# Patient Record
Sex: FEMALE | Race: BLACK OR AFRICAN AMERICAN | NOT HISPANIC OR LATINO | Employment: FULL TIME | URBAN - METROPOLITAN AREA
[De-identification: names, ages, dates, MRNs, and addresses within clinical notes are randomized per-mention and may not be internally consistent; named-entity substitution may affect disease eponyms.]

---

## 2017-01-06 ENCOUNTER — OFFICE VISIT (OUTPATIENT)
Dept: FAMILY MEDICINE | Facility: CLINIC | Age: 42
End: 2017-01-06
Payer: COMMERCIAL

## 2017-01-06 VITALS
BODY MASS INDEX: 29.72 KG/M2 | SYSTOLIC BLOOD PRESSURE: 120 MMHG | WEIGHT: 178.38 LBS | OXYGEN SATURATION: 98 % | HEIGHT: 65 IN | TEMPERATURE: 98 F | DIASTOLIC BLOOD PRESSURE: 80 MMHG | HEART RATE: 98 BPM

## 2017-01-06 DIAGNOSIS — K21.9 GASTROESOPHAGEAL REFLUX DISEASE WITHOUT ESOPHAGITIS: ICD-10-CM

## 2017-01-06 DIAGNOSIS — F40.248 FEAR OF PUBLIC SPEAKING: Primary | ICD-10-CM

## 2017-01-06 DIAGNOSIS — F90.9 ATTENTION DEFICIT HYPERACTIVITY DISORDER (ADHD), UNSPECIFIED ADHD TYPE: ICD-10-CM

## 2017-01-06 PROCEDURE — 1159F MED LIST DOCD IN RCRD: CPT | Mod: S$GLB,,, | Performed by: FAMILY MEDICINE

## 2017-01-06 PROCEDURE — 99999 PR PBB SHADOW E&M-EST. PATIENT-LVL III: CPT | Mod: PBBFAC,,, | Performed by: FAMILY MEDICINE

## 2017-01-06 PROCEDURE — 99214 OFFICE O/P EST MOD 30 MIN: CPT | Mod: S$GLB,,, | Performed by: FAMILY MEDICINE

## 2017-01-06 RX ORDER — PROPRANOLOL HYDROCHLORIDE 40 MG/1
40 TABLET ORAL DAILY PRN
Qty: 10 TABLET | Refills: 0 | Status: SHIPPED | OUTPATIENT
Start: 2017-01-06 | End: 2017-10-24

## 2017-01-06 RX ORDER — LORAZEPAM 0.5 MG/1
0.5 TABLET ORAL DAILY PRN
Qty: 10 TABLET | Refills: 0 | Status: SHIPPED | OUTPATIENT
Start: 2017-01-06 | End: 2018-08-16 | Stop reason: SDUPTHER

## 2017-01-06 NOTE — MR AVS SNAPSHOT
[facility]  [address]  [place] [address]  Phone: [phone]  Fax: [phone]                  Fadumo Barnhart   2017 7:30 AM   Office Visit    Description:  Female : 1975   Provider:  Suze Chi MD   Department:  [facility]           Reason for Visit     Anxiety                To Do List           Future Appointments        Provider Department Dept Phone    2017 2:30 PM PRE-ADMIT 1, [facility] [phone]      Your Future Surgeries/Procedures     Mar 02, 2017   Surgery with Kyel Merida MD   [facility] ([facility])    [address] [place] [address] [phone]              Goals (5 Years of Data)     None       These Medications        Disp Refills Start End    propranolol (INDERAL) 40 MG tablet 10 tablet 0 2017    Take 1 tablet (40 mg total) by mouth daily as needed. 60 minutes prior to anxiety-provoking event. - Oral    [facility]: [facility] [address] [place] [address] [address] [facility] #: [phone]       lorazepam (ATIVAN) 0.5 MG tablet 10 tablet 0 2017    Take 1 tablet (0.5 mg total) by mouth daily as needed for Anxiety. - Oral    [facility] [address] [place] [address] [address] AT [facility] Ph #: [phone]         [facility]     [facility] -  Assistance  Registered nurses in the [facility] provide clinical advisement, health education, appointment booking, and other advisory services.  Call for this free service at [phone].             Medications           Message regarding Medications     Verify the changes and/or additions to your medication regime listed below are the same as discussed with your clinician today.  If any of these changes or additions are incorrect, please notify your healthcare provider.       "  START taking these NEW medications        Refills    propranolol (INDERAL) 40 MG tablet 0    Sig: Take 1 tablet (40 mg total) by mouth daily as needed. 60 minutes prior to anxiety-provoking event.    Class: Normal    Route: Oral    lorazepam (ATIVAN) 0.5 MG tablet 0    Sig: Take 1 tablet (0.5 mg total) by mouth daily as needed for Anxiety.    Class: Print    Route: Oral           Verify that the below list of medications is an accurate representation of the medications you are currently taking.  If none reported, the list may be blank. If incorrect, please contact your healthcare provider. Carry this list with you in case of emergency.           Current Medications     dextroamphetamine-amphetamine (ADDERALL XR) 20 MG 24 hr capsule Take 1 capsule (20 mg total) by mouth every morning.    esomeprazole (NEXIUM) 20 MG capsule Take 1 capsule (20 mg total) by mouth before breakfast.    lorazepam (ATIVAN) 0.5 MG tablet Take 1 tablet (0.5 mg total) by mouth daily as needed for Anxiety.    propranolol (INDERAL) 40 MG tablet Take 1 tablet (40 mg total) by mouth daily as needed. 60 minutes prior to anxiety-provoking event.           Clinical Reference Information           Vital Signs - Last Recorded  Most recent update: 1/6/2017  7:48 AM by Lindsey Nicholas MA    BP Pulse Temp Ht    120/80 (BP Location: Left arm, Patient Position: Sitting, BP Method: Manual) 98 98.2 °F (36.8 °C) (Oral) 5' 4.75" (1.645 m)    Wt LMP SpO2 BMI    80.9 kg (178 lb 5.6 oz) 12/12/2016 98% 29.91 kg/m2      Blood Pressure          Most Recent Value    BP  120/80      Allergies as of 1/6/2017     Hydrocodone-acetaminophen    Aspirin      Immunizations Administered on Date of Encounter - 1/6/2017     None      "

## 2017-01-06 NOTE — PROGRESS NOTES
Routine Office Visit    Patient Name: Fadumo Barnhart    : 1975  MRN: [id number]    Subjective:  Fadumo is a 41 y.o. female who presents today for     1. Anxiety - pt states she had a recent family death and the wake and  is tomorrow. She is to speak at the [employer] tomorrow and states she has severe anxiety with public speaking. She states when she has to speak in front of a large group, she starts feeling angst, has increase heart rate and does not want to fall apart in front of the crowd. She has been having crying spells thinking about the speech. She is otherwise feeling okay. She is still working out regularly. 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     Review of Systems   Constitutional: Negative for chills and fever.   HENT: Negative for congestion.    Eyes: Negative for blurred vision.   Respiratory: Negative for cough.    Cardiovascular: Negative for chest pain.   Gastrointestinal: Negative for abdominal pain, constipation, diarrhea, heartburn, nausea and vomiting.   Genitourinary: Negative for dysuria.   Musculoskeletal: Negative for myalgias.   Skin: Negative for itching and rash.   Neurological: Negative for dizziness and headaches.   Psychiatric/Behavioral: Negative for depression.       Active Problem List  Patient Active Problem List   Diagnosis    ADHD (attention deficit hyperactivity disorder)    GERD (gastroesophageal reflux disease)       Past Surgical History  Past Surgical History   Procedure Laterality Date    Tonsillectomy      Leep      Gastric bypass      Small intestine surgery       intestine were twisted       Family History  Family History   Problem Relation Age of Onset    Diabetes Mother     Heart disease Mother     Hypertension Mother     Kidney disease Mother     Miscarriages / Stillbirths Mother     Vision loss Mother     Hypertension Father     Alcohol abuse Father      prior, not current    Alcohol abuse Brother     Drug abuse Brother   "   Learning disabilities Brother      ADD/ADHD, dyslexia    Alcohol abuse Paternal Uncle     Cancer Paternal Uncle     Depression Paternal Uncle     Early death Paternal Uncle     Diabetes Maternal Aunt     Miscarriages / Stillbirths Maternal Aunt     Diabetes Maternal Uncle     Kidney disease Maternal Uncle        Social History  Social History     Social History    Marital status:      Spouse name: N/A    Number of children: N/A    Years of education: N/A     Occupational History     [employer]     Social History Main Topics    Smoking status: Never Smoker    Smokeless tobacco: Never Used    Alcohol use Yes    Drug use: No    Sexual activity: Yes     Partners: Male     Other Topics Concern    Not on file     Social History Narrative       Medications and Allergies  Reviewed and updated.       Physical Exam  Visit Vitals    /80 (BP Location: Left arm, Patient Position: Sitting, BP Method: Manual)    Pulse 98    Temp 98.2 °F (36.8 °C) (Oral)    Ht 5' 4.75" (1.645 m)    Wt 80.9 kg (178 lb 5.6 oz)    LMP 12/12/2016    SpO2 98%    BMI 29.91 kg/m2     Physical Exam   Constitutional: She is oriented to person, place, and time. She appears well-developed and well-nourished.   HENT:   Head: Normocephalic and atraumatic.   Eyes: Conjunctivae and EOM are normal. Pupils are equal, round, and reactive to light.   Neck: Normal range of motion. Neck supple.   Cardiovascular: Normal rate, regular rhythm and normal heart sounds.  Exam reveals no gallop and no friction rub.    No murmur heard.  Pulmonary/Chest: Breath sounds normal. No respiratory distress.   Abdominal: Soft. Bowel sounds are normal. She exhibits no distension. There is no tenderness.   Musculoskeletal: Normal range of motion.   Lymphadenopathy:     She has no cervical adenopathy.   Neurological: She is alert and oriented to person, place, and time.   Skin: Skin is warm.   Psychiatric: She has a normal mood and " affect.         Assessment/Plan:  Fadumo Barnhart is a 41 y.o. female who presents today for :    Fear of public speaking  -     propranolol (INDERAL) 40 MG tablet; Take 1 tablet (40 mg total) by mouth daily as needed. 60 minutes prior to anxiety-provoking event.  Dispense: 10 tablet; Refill: 0  -     lorazepam (ATIVAN) 0.5 MG tablet; Take 1 tablet (0.5 mg total) by mouth daily as needed for Anxiety.  Dispense: 10 tablet; Refill: 0  Common side effects of this medication were discussed with the patient. Questions regarding medications were discussed during this visit.   Advise not to take ativan with adderall  Recommend to take propranolol first; only ativan if needed for severe anxiety     Attention deficit hyperactivity disorder (ADHD), unspecified ADHD type  The current medical regimen is effective;  continue present plan and medications.    Gastroesophageal reflux disease without esophagitis  The current medical regimen is effective;  continue present plan and medications.      Return in about 3 months (around 4/6/2017), or if symptoms worsen or fail to improve.

## 2017-02-24 DIAGNOSIS — F90.9 ATTENTION DEFICIT HYPERACTIVITY DISORDER (ADHD), UNSPECIFIED ADHD TYPE: ICD-10-CM

## 2017-02-24 NOTE — TELEPHONE ENCOUNTER
----- Message from Cecil Cleveland sent at 2/24/2017 12:49 PM CST -----  Contact: Self  REFILL: dextroamphetamine-amphetamine (ADDERALL XR) 20 MG 24 hr capsule

## 2017-03-01 ENCOUNTER — TELEPHONE (OUTPATIENT)
Dept: FAMILY MEDICINE | Facility: CLINIC | Age: 42
End: 2017-03-01

## 2017-03-01 RX ORDER — DEXTROAMPHETAMINE SACCHARATE, AMPHETAMINE ASPARTATE MONOHYDRATE, DEXTROAMPHETAMINE SULFATE AND AMPHETAMINE SULFATE 5; 5; 5; 5 MG/1; MG/1; MG/1; MG/1
20 CAPSULE, EXTENDED RELEASE ORAL EVERY MORNING
Qty: 30 CAPSULE | Refills: 0 | Status: SHIPPED | OUTPATIENT
Start: 2017-03-01 | End: 2017-04-18 | Stop reason: SDUPTHER

## 2017-03-01 NOTE — TELEPHONE ENCOUNTER
----- Message from Vera Vail sent at 3/1/2017  8:58 AM CST -----  Patient called to follow up on refill request dextroamphetamine-amphetamine (ADDERALL XR) 20 MG 24 hr capsule. Please call at [phone]. Thank you!

## 2017-04-18 DIAGNOSIS — F90.9 ATTENTION DEFICIT HYPERACTIVITY DISORDER (ADHD), UNSPECIFIED ADHD TYPE: ICD-10-CM

## 2017-04-18 NOTE — TELEPHONE ENCOUNTER
----- Message from Zoe Garcia sent at 4/18/2017  1:04 PM CDT -----  Contact: SELF  REFILL: dextroamphetamine-amphetamine (ADDERALL XR) 20 MG 24 hr capsule    PLEASE CONTACT PATIENT -[phone] WHEN READY.

## 2017-04-19 RX ORDER — DEXTROAMPHETAMINE SACCHARATE, AMPHETAMINE ASPARTATE MONOHYDRATE, DEXTROAMPHETAMINE SULFATE AND AMPHETAMINE SULFATE 5; 5; 5; 5 MG/1; MG/1; MG/1; MG/1
20 CAPSULE, EXTENDED RELEASE ORAL EVERY MORNING
Qty: 30 CAPSULE | Refills: 0 | Status: SHIPPED | OUTPATIENT
Start: 2017-04-19 | End: 2017-05-25 | Stop reason: SDUPTHER

## 2017-05-25 DIAGNOSIS — F90.9 ATTENTION DEFICIT HYPERACTIVITY DISORDER (ADHD), UNSPECIFIED ADHD TYPE: ICD-10-CM

## 2017-05-25 RX ORDER — DEXTROAMPHETAMINE SACCHARATE, AMPHETAMINE ASPARTATE MONOHYDRATE, DEXTROAMPHETAMINE SULFATE AND AMPHETAMINE SULFATE 5; 5; 5; 5 MG/1; MG/1; MG/1; MG/1
20 CAPSULE, EXTENDED RELEASE ORAL EVERY MORNING
Qty: 30 CAPSULE | Refills: 0 | Status: SHIPPED | OUTPATIENT
Start: 2017-05-25 | End: 2017-07-13 | Stop reason: SDUPTHER

## 2017-05-25 NOTE — TELEPHONE ENCOUNTER
----- Message from Alberta William sent at 5/25/2017 11:18 AM CDT -----  Contact: self  Refill request for Maurizio .   [phone]    LL

## 2017-07-07 ENCOUNTER — TELEPHONE (OUTPATIENT)
Dept: FAMILY MEDICINE | Facility: CLINIC | Age: 42
End: 2017-07-07

## 2017-07-07 DIAGNOSIS — F90.9 ATTENTION DEFICIT HYPERACTIVITY DISORDER (ADHD), UNSPECIFIED ADHD TYPE: ICD-10-CM

## 2017-07-07 RX ORDER — DEXTROAMPHETAMINE SACCHARATE, AMPHETAMINE ASPARTATE MONOHYDRATE, DEXTROAMPHETAMINE SULFATE AND AMPHETAMINE SULFATE 5; 5; 5; 5 MG/1; MG/1; MG/1; MG/1
20 CAPSULE, EXTENDED RELEASE ORAL EVERY MORNING
Qty: 30 CAPSULE | Refills: 0 | OUTPATIENT
Start: 2017-07-07

## 2017-07-07 NOTE — TELEPHONE ENCOUNTER
----- Message from Skyla Anegl sent at 7/7/2017  4:00 PM CDT -----  Contact: self  Pt called concerning her script for dextroamphetamine-amphetamine (ADDERALL XR) 20 MG 24 hr capsule. [phone]

## 2017-07-13 ENCOUNTER — OFFICE VISIT (OUTPATIENT)
Dept: FAMILY MEDICINE | Facility: CLINIC | Age: 42
End: 2017-07-13
Payer: COMMERCIAL

## 2017-07-13 VITALS
HEART RATE: 77 BPM | TEMPERATURE: 99 F | OXYGEN SATURATION: 96 % | WEIGHT: 179.44 LBS | SYSTOLIC BLOOD PRESSURE: 100 MMHG | HEIGHT: 65 IN | DIASTOLIC BLOOD PRESSURE: 70 MMHG | BODY MASS INDEX: 29.9 KG/M2

## 2017-07-13 DIAGNOSIS — K21.9 GASTROESOPHAGEAL REFLUX DISEASE WITHOUT ESOPHAGITIS: ICD-10-CM

## 2017-07-13 DIAGNOSIS — F90.9 ATTENTION DEFICIT HYPERACTIVITY DISORDER (ADHD), UNSPECIFIED ADHD TYPE: ICD-10-CM

## 2017-07-13 PROCEDURE — 99214 OFFICE O/P EST MOD 30 MIN: CPT | Mod: S$GLB,,, | Performed by: FAMILY MEDICINE

## 2017-07-13 PROCEDURE — 99999 PR PBB SHADOW E&M-EST. PATIENT-LVL III: CPT | Mod: PBBFAC,,, | Performed by: FAMILY MEDICINE

## 2017-07-13 RX ORDER — DEXTROAMPHETAMINE SACCHARATE, AMPHETAMINE ASPARTATE MONOHYDRATE, DEXTROAMPHETAMINE SULFATE AND AMPHETAMINE SULFATE 5; 5; 5; 5 MG/1; MG/1; MG/1; MG/1
20 CAPSULE, EXTENDED RELEASE ORAL EVERY MORNING
Qty: 30 CAPSULE | Refills: 0 | Status: SHIPPED | OUTPATIENT
Start: 2017-07-13 | End: 2017-10-24 | Stop reason: SDUPTHER

## 2017-07-13 RX ORDER — DEXTROAMPHETAMINE SACCHARATE, AMPHETAMINE ASPARTATE MONOHYDRATE, DEXTROAMPHETAMINE SULFATE AND AMPHETAMINE SULFATE 5; 5; 5; 5 MG/1; MG/1; MG/1; MG/1
20 CAPSULE, EXTENDED RELEASE ORAL EVERY MORNING
Qty: 30 CAPSULE | Refills: 0 | Status: SHIPPED | OUTPATIENT
Start: 2017-09-13 | End: 2017-10-24

## 2017-07-13 RX ORDER — DEXTROAMPHETAMINE SACCHARATE, AMPHETAMINE ASPARTATE MONOHYDRATE, DEXTROAMPHETAMINE SULFATE AND AMPHETAMINE SULFATE 5; 5; 5; 5 MG/1; MG/1; MG/1; MG/1
20 CAPSULE, EXTENDED RELEASE ORAL EVERY MORNING
Qty: 30 CAPSULE | Refills: 0 | Status: SHIPPED | OUTPATIENT
Start: 2017-08-13 | End: 2017-10-24

## 2017-07-13 RX ORDER — HYDROGEN PEROXIDE 3 %
20 SOLUTION, NON-ORAL MISCELLANEOUS
Qty: 90 CAPSULE | Refills: 1 | Status: SHIPPED | OUTPATIENT
Start: 2017-07-13 | End: 2018-11-29 | Stop reason: SDUPTHER

## 2017-07-13 NOTE — PROGRESS NOTES
Routine Office Visit    Patient Name: Fadumo Barnhart    : 1975  MRN: [id number]    Subjective:  Fadumo is a 42 y.o. female who presents today for     1. 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 She does not abuse medication. She does not take more than prescribed. She needs medication to help her focus.   2. GERD - needs refill on nexium    Review of Systems   Constitutional: Negative for chills and fever.   HENT: Negative for congestion.    Eyes: Negative for blurred vision.   Respiratory: Negative for cough.    Cardiovascular: Negative for chest pain.   Gastrointestinal: Positive for heartburn. Negative for abdominal pain, constipation, diarrhea, nausea and vomiting.   Genitourinary: Negative for dysuria.   Musculoskeletal: Negative for myalgias.   Skin: Negative for itching and rash.   Neurological: Negative for dizziness and headaches.   Psychiatric/Behavioral: Negative for depression.       Active Problem List  Patient Active Problem List   Diagnosis    ADHD (attention deficit hyperactivity disorder)    GERD (gastroesophageal reflux disease)       Past Surgical History  Past Surgical History:   Procedure Laterality Date    GASTRIC BYPASS      LEEP      SMALL INTESTINE SURGERY      intestine were twisted    TONSILLECTOMY         Family History  Family History   Problem Relation Age of Onset    Diabetes Mother     Heart disease Mother     Hypertension Mother     Kidney disease Mother     Miscarriages / Stillbirths Mother     Vision loss Mother     Hypertension Father     Alcohol abuse Father      prior, not current    Alcohol abuse Brother     Drug abuse Brother     Learning disabilities Brother      ADD/ADHD, dyslexia    Alcohol abuse Paternal Uncle     Cancer Paternal Uncle     Depression Paternal Uncle     Early death Paternal Uncle     Diabetes Maternal Aunt     Miscarriages /  "Stillbirths Maternal Aunt     Diabetes Maternal Uncle     Kidney disease Maternal Uncle        Social History  Social History     Social History    Marital status:      Spouse name: N/A    Number of children: N/A    Years of education: N/A     Occupational History     [employer]     Social History Main Topics    Smoking status: Never Smoker    Smokeless tobacco: Never Used    Alcohol use Yes    Drug use: No    Sexual activity: Yes     Partners: Male     Other Topics Concern    Not on file     Social History Narrative    No narrative on file       Medications and Allergies  Reviewed and updated.   Current Outpatient Prescriptions   Medication Sig    dextroamphetamine-amphetamine (ADDERALL XR) 20 MG 24 hr capsule Take 1 capsule (20 mg total) by mouth every morning.    [START ON 8/13/2017] dextroamphetamine-amphetamine (ADDERALL XR) 20 MG 24 hr capsule Take 1 capsule (20 mg total) by mouth every morning.    [START ON 9/13/2017] dextroamphetamine-amphetamine (ADDERALL XR) 20 MG 24 hr capsule Take 1 capsule (20 mg total) by mouth every morning.    esomeprazole (NEXIUM) 20 MG capsule Take 1 capsule (20 mg total) by mouth before breakfast.    propranolol (INDERAL) 40 MG tablet Take 1 tablet (40 mg total) by mouth daily as needed. 60 minutes prior to anxiety-provoking event.    lorazepam (ATIVAN) 0.5 MG tablet Take 1 tablet (0.5 mg total) by mouth daily as needed for Anxiety.     No current facility-administered medications for this visit.        Physical Exam  /70 (BP Location: Right arm, Patient Position: Sitting, BP Method: Manual)   Pulse 77   Temp 98.5 °F (36.9 °C) (Oral)   Ht 5' 4.75" (1.645 m)   Wt 81.4 kg (179 lb 7.3 oz)   SpO2 96%   BMI 30.09 kg/m²   Physical Exam   Constitutional: She is oriented to person, place, and time. She appears well-developed and well-nourished.   HENT:   Head: Normocephalic and atraumatic.   Eyes: Conjunctivae and EOM are normal. Pupils " are equal, round, and reactive to light.   Neck: Normal range of motion. Neck supple.   Cardiovascular: Normal rate, regular rhythm and normal heart sounds.  Exam reveals no gallop and no friction rub.    No murmur heard.  Pulmonary/Chest: Breath sounds normal. No respiratory distress.   Abdominal: Soft. Bowel sounds are normal. She exhibits no distension. There is no tenderness.   Musculoskeletal: Normal range of motion.   Lymphadenopathy:     She has no cervical adenopathy.   Neurological: She is alert and oriented to person, place, and time.   Skin: Skin is warm.   Psychiatric: She has a normal mood and affect.         Assessment/Plan:  Fadumo Barnhart is a 42 y.o. female who presents today for :    Attention deficit hyperactivity disorder (ADHD), unspecified ADHD type  -     dextroamphetamine-amphetamine (ADDERALL XR) 20 MG 24 hr capsule; Take 1 capsule (20 mg total) by mouth every morning.  Dispense: 30 capsule; Refill: 0  -     dextroamphetamine-amphetamine (ADDERALL XR) 20 MG 24 hr capsule; Take 1 capsule (20 mg total) by mouth every morning.  Dispense: 30 capsule; Refill: 0  -     dextroamphetamine-amphetamine (ADDERALL XR) 20 MG 24 hr capsule; Take 1 capsule (20 mg total) by mouth every morning.  Dispense: 30 capsule; Refill: 0  Reviewed [place]   The current medical regimen is effective;  continue present plan and medications.  rtc 3 months for refill     Gastroesophageal reflux disease without esophagitis  -     esomeprazole (NEXIUM) 20 MG capsule; Take 1 capsule (20 mg total) by mouth before breakfast.  Dispense: 90 capsule; Refill: 1        Return in about 3 months (around 10/13/2017).

## 2017-10-24 ENCOUNTER — OFFICE VISIT (OUTPATIENT)
Dept: FAMILY MEDICINE | Facility: CLINIC | Age: 42
End: 2017-10-24
Payer: COMMERCIAL

## 2017-10-24 VITALS
WEIGHT: 175.06 LBS | BODY MASS INDEX: 29.17 KG/M2 | DIASTOLIC BLOOD PRESSURE: 80 MMHG | OXYGEN SATURATION: 99 % | HEART RATE: 58 BPM | HEIGHT: 65 IN | SYSTOLIC BLOOD PRESSURE: 110 MMHG | TEMPERATURE: 98 F

## 2017-10-24 DIAGNOSIS — K21.9 GASTROESOPHAGEAL REFLUX DISEASE WITHOUT ESOPHAGITIS: Primary | ICD-10-CM

## 2017-10-24 DIAGNOSIS — F90.9 ATTENTION DEFICIT HYPERACTIVITY DISORDER (ADHD), UNSPECIFIED ADHD TYPE: ICD-10-CM

## 2017-10-24 DIAGNOSIS — Z23 NEED FOR PROPHYLACTIC VACCINATION AND INOCULATION AGAINST INFLUENZA: ICD-10-CM

## 2017-10-24 PROCEDURE — 99214 OFFICE O/P EST MOD 30 MIN: CPT | Mod: 25,S$GLB,, | Performed by: FAMILY MEDICINE

## 2017-10-24 PROCEDURE — 90471 IMMUNIZATION ADMIN: CPT | Mod: S$GLB,,, | Performed by: FAMILY MEDICINE

## 2017-10-24 PROCEDURE — 90686 IIV4 VACC NO PRSV 0.5 ML IM: CPT | Mod: S$GLB,,, | Performed by: FAMILY MEDICINE

## 2017-10-24 PROCEDURE — 99999 PR PBB SHADOW E&M-EST. PATIENT-LVL IV: CPT | Mod: PBBFAC,,, | Performed by: FAMILY MEDICINE

## 2017-10-24 RX ORDER — DEXTROAMPHETAMINE SACCHARATE, AMPHETAMINE ASPARTATE MONOHYDRATE, DEXTROAMPHETAMINE SULFATE AND AMPHETAMINE SULFATE 6.25; 6.25; 6.25; 6.25 MG/1; MG/1; MG/1; MG/1
25 CAPSULE, EXTENDED RELEASE ORAL EVERY MORNING
Qty: 30 CAPSULE | Refills: 0 | Status: SHIPPED | OUTPATIENT
Start: 2017-10-24 | End: 2018-04-07 | Stop reason: SDUPTHER

## 2017-10-24 RX ORDER — DEXTROAMPHETAMINE SACCHARATE, AMPHETAMINE ASPARTATE MONOHYDRATE, DEXTROAMPHETAMINE SULFATE AND AMPHETAMINE SULFATE 5; 5; 5; 5 MG/1; MG/1; MG/1; MG/1
20 CAPSULE, EXTENDED RELEASE ORAL EVERY MORNING
Qty: 30 CAPSULE | Refills: 0 | Status: SHIPPED | OUTPATIENT
Start: 2017-11-22 | End: 2017-12-22

## 2017-10-24 RX ORDER — DEXTROAMPHETAMINE SACCHARATE, AMPHETAMINE ASPARTATE MONOHYDRATE, DEXTROAMPHETAMINE SULFATE AND AMPHETAMINE SULFATE 5; 5; 5; 5 MG/1; MG/1; MG/1; MG/1
20 CAPSULE, EXTENDED RELEASE ORAL EVERY MORNING
Qty: 30 CAPSULE | Refills: 0 | Status: SHIPPED | OUTPATIENT
Start: 2017-12-22 | End: 2018-01-21

## 2017-10-24 NOTE — PROGRESS NOTES
Routine Office Visit    Patient Name: Fadumo Barnhart    : 1975  MRN: [id number]    Subjective:  Fadumo is a 42 y.o. female who presents today for     1.  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 She does not abuse medication. She does not take more than prescribed. She denies any side effect from medications such as weight loss, hypertension, chest pain, heart palpitations or anxiety. She feels current dose is a little low, but it does help her focus. She is getting tired of her job and would prefer to  but needs the benefits. 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     Review of Systems   Constitutional: Negative for chills and fever.   HENT: Negative for congestion.    Eyes: Negative for blurred vision.   Respiratory: Negative for cough.    Cardiovascular: Negative for chest pain.   Gastrointestinal: Negative for abdominal pain, constipation, diarrhea, heartburn, nausea and vomiting.   Genitourinary: Negative for dysuria.   Musculoskeletal: Negative for myalgias.   Skin: Negative for itching and rash.   Neurological: Negative for dizziness and headaches.   Psychiatric/Behavioral: Negative for depression.       Active Problem List  Patient Active Problem List   Diagnosis    ADHD (attention deficit hyperactivity disorder)    GERD (gastroesophageal reflux disease)       Past Surgical History  Past Surgical History:   Procedure Laterality Date    GASTRIC BYPASS      LEEP      SMALL INTESTINE SURGERY      intestine were twisted    TONSILLECTOMY         Family History  Family History   Problem Relation Age of Onset    Diabetes Mother     Heart disease Mother     Hypertension Mother     Kidney disease Mother     Miscarriages / Stillbirths Mother      "Vision loss Mother     Hypertension Father     Alcohol abuse Father      prior, not current    Alcohol abuse Brother     Drug abuse Brother     Learning disabilities Brother      ADD/ADHD, dyslexia    Alcohol abuse Paternal Uncle     Cancer Paternal Uncle     Depression Paternal Uncle     Early death Paternal Uncle     Diabetes Maternal Aunt     Miscarriages / Stillbirths Maternal Aunt     Diabetes Maternal Uncle     Kidney disease Maternal Uncle        Social History  Social History     Social History    Marital status:      Spouse name: N/A    Number of children: N/A    Years of education: N/A     Occupational History     [employer]     Social History Main Topics    Smoking status: Never Smoker    Smokeless tobacco: Never Used    Alcohol use Yes    Drug use: No    Sexual activity: Yes     Partners: Male     Other Topics Concern    Not on file     Social History Narrative    No narrative on file       Medications and Allergies  Reviewed and updated.   Current Outpatient Prescriptions   Medication Sig    [START ON 11/22/2017] dextroamphetamine-amphetamine (ADDERALL XR) 20 MG 24 hr capsule Take 1 capsule (20 mg total) by mouth every morning.    [START ON 12/22/2017] dextroamphetamine-amphetamine (ADDERALL XR) 20 MG 24 hr capsule Take 1 capsule (20 mg total) by mouth every morning.    dextroamphetamine-amphetamine (ADDERALL XR) 25 MG 24 hr capsule Take 1 capsule (25 mg total) by mouth every morning.    esomeprazole (NEXIUM) 20 MG capsule Take 1 capsule (20 mg total) by mouth before breakfast.    lorazepam (ATIVAN) 0.5 MG tablet Take 1 tablet (0.5 mg total) by mouth daily as needed for Anxiety.     No current facility-administered medications for this visit.        Physical Exam  /80 (BP Location: Right arm, Patient Position: Sitting, BP Method: Large (Manual))   Pulse (!) 58   Temp 97.7 °F (36.5 °C) (Oral)   Ht 5' 4.75" (1.645 m)   Wt 79.4 kg (175 lb 0.7 oz) "   LMP 10/03/2017   SpO2 99%   BMI 29.35 kg/m²   Physical Exam   Constitutional: She is oriented to person, place, and time. She appears well-developed and well-nourished.   HENT:   Head: Normocephalic and atraumatic.   Eyes: Conjunctivae and EOM are normal. Pupils are equal, round, and reactive to light.   Neck: Normal range of motion. Neck supple.   Cardiovascular: Normal rate, regular rhythm and normal heart sounds.  Exam reveals no gallop and no friction rub.    No murmur heard.  Pulmonary/Chest: Breath sounds normal. No respiratory distress.   Abdominal: Soft. Bowel sounds are normal. She exhibits no distension. There is no tenderness.   Musculoskeletal: Normal range of motion.   Lymphadenopathy:     She has no cervical adenopathy.   Neurological: She is alert and oriented to person, place, and time.   Skin: Skin is warm.   Psychiatric: She has a normal mood and affect.         Assessment/Plan:  Fadumo Barnhart is a 42 y.o. female who presents today for :    Gastroesophageal reflux disease without esophagitis  The current medical regimen is effective;  continue present plan and medications.    Attention deficit hyperactivity disorder (ADHD), unspecified ADHD type  -     dextroamphetamine-amphetamine (ADDERALL XR) 25 MG 24 hr capsule; Take 1 capsule (25 mg total) by mouth every morning.  Dispense: 30 capsule; Refill: 0  -     dextroamphetamine-amphetamine (ADDERALL XR) 20 MG 24 hr capsule; Take 1 capsule (20 mg total) by mouth every morning.  Dispense: 30 capsule; Refill: 0  -     dextroamphetamine-amphetamine (ADDERALL XR) 20 MG 24 hr capsule; Take 1 capsule (20 mg total) by mouth every morning.  Dispense: 30 capsule; Refill: 0  The current medical regimen is effective;  continue present plan and medications.  RTC 3 weeks     Need for prophylactic vaccination and inoculation against influenza  -     Influenza - Quadrivalent (3 years & older) (PF)        Return if symptoms worsen or fail to improve.

## 2017-10-24 NOTE — PATIENT INSTRUCTIONS
Treating ADHD: Medicine  In many cases, medicine is part of a childs treatment plan. These medicines provide a steady supply of the chemicals needed to send and receive messages within the brain.    Sending messages  Certain stimulants cause some sites in the brain to send stronger messages. When the messages are stronger, the child has better control over attention and activity. Stimulants work quickly and last a few hours. Extended release or long-acting stimulants may also be prescribed once your child's dose has been regulated by his or her healthcare provider.   Receiving messages  Some antidepressants help the brain receive messages better. Used to treat depression and inattention, these medicines are taken daily.  Be aware  It may take a few tries to find the best medicine for your child. The amount and time of use may also need to be adjusted. In some cases, your child may need to be checked for side effects. If medicine doesnt help, think about having your child reevaluated.  Parents role  Recommendations of what you can do to help your child:   · Learn about the medicine your child takes, any side effects that might happen, and what results you can expect.  · Seek a second opinion if you have concerns about how your childs treatment is being managed.  · Make sure you, the school staff, and other caregivers follow all directions for giving your child medicine.  · Watch your child for positive changes both at home and in school. Keep track of any side effects. Tell your child's healthcare provider what you or others observe.  · Avoid running low on medicine. Some prescriptions are special and need extra time to fill.  Childs role  Here are suggestions for what you can do:   · How do you feel after you take your medicine? Tell your parents and healthcare provider how you feel.  · Your medicine comes in a pill. If you cant swallow the whole pill, ask your parents how to make it easier.  · Learn when to  take your pill. Remind your parents or teachers when it is time.  · If someone teases you about taking medicine, talk to your parents or teacher. They can help you decide what to tell that person.  Date Last Reviewed: 12/1/2016  © 0660-9373 [employer]. [address] [place] [address], [place] [address]. All rights reserved. This information is not intended as a substitute for professional medical care. Always follow your healthcare professional's instructions.

## 2018-01-18 ENCOUNTER — OFFICE VISIT (OUTPATIENT)
Dept: FAMILY MEDICINE | Facility: CLINIC | Age: 43
End: 2018-01-18
Payer: COMMERCIAL

## 2018-01-18 VITALS
WEIGHT: 178.13 LBS | SYSTOLIC BLOOD PRESSURE: 98 MMHG | DIASTOLIC BLOOD PRESSURE: 70 MMHG | TEMPERATURE: 98 F | OXYGEN SATURATION: 99 % | HEIGHT: 65 IN | HEART RATE: 100 BPM | BODY MASS INDEX: 29.68 KG/M2

## 2018-01-18 DIAGNOSIS — R10.2 PELVIC PAIN IN FEMALE: ICD-10-CM

## 2018-01-18 DIAGNOSIS — D25.9 UTERINE LEIOMYOMA, UNSPECIFIED LOCATION: Primary | ICD-10-CM

## 2018-01-18 PROCEDURE — 99999 PR PBB SHADOW E&M-EST. PATIENT-LVL IV: CPT | Mod: PBBFAC,,, | Performed by: NURSE PRACTITIONER

## 2018-01-18 PROCEDURE — 99214 OFFICE O/P EST MOD 30 MIN: CPT | Mod: S$GLB,,, | Performed by: NURSE PRACTITIONER

## 2018-01-18 RX ORDER — IBUPROFEN 800 MG/1
800 TABLET ORAL 3 TIMES DAILY
Qty: 30 TABLET | Refills: 0 | Status: SHIPPED | OUTPATIENT
Start: 2018-01-18 | End: 2018-02-12 | Stop reason: SDUPTHER

## 2018-01-18 RX ORDER — TRAMADOL HYDROCHLORIDE 50 MG/1
50 TABLET ORAL EVERY 6 HOURS PRN
Qty: 12 TABLET | Refills: 0 | Status: SHIPPED | OUTPATIENT
Start: 2018-01-18 | End: 2018-01-28

## 2018-01-18 NOTE — PATIENT INSTRUCTIONS
Pelvic Pain, Uncertain Cause    Pelvic pain is pain felt in the lowest part of the belly (abdomen) and between the hipbones. The pain may be acute. This means it occurred suddenly and recently. Or the pain may be chronic. This means it has occurred for 6 months or longer.  There are many possible causes of pelvic pain. The pain may be due to a problem in the female reproductive system (pictured here). Or, it may be due to a problem in the digestive, urinary, or musculoskeletal systems.  Based on your visit today, the exact cause of your pelvic pain is not certain. Your condition does not appear to be serious at this time. But it is important for you to keep watching for any new symptoms or worsening of your condition.  General care  Your healthcare provider may advise a number of ways to help manage your pain. These can include:  · Taking over-the-counter pain medicine. Stronger pain medicine may also be prescribed, if needed.  · Applying heat to the pelvic area. Use a heating pad or a hot pack. Taking a hot bath may also help.  · Getting plenty of rest.  · Making certain lifestyle changes. These can include practicing good posture and getting regular exercise. (Studies have shown that these changes help reduce pelvic pain in some women.)  · Seeing a physical therapist or pain specialist. These healthcare providers can discuss other ways to manage pain with you.  Follow-up care  Follow up with your healthcare provider, or as advised.   When to seek medical advice  Call your healthcare provider right away if any of the following occur:  · Fever of 100.4°F or higher, or as directed by your healthcare provider  · Pain worsens or you have sudden, severe pain or new pain  · Nausea, vomiting, sweating, or restlessness  · Dizziness or fainting  · Unusual vaginal discharge  · Abnormal vaginal bleeding (especially bleeding after menopause)  Date Last Reviewed: 6/11/2015  © 3745-6086 [employer]. [address] [place] [address]  [place] [address]. All rights reserved. This information is not intended as a substitute for professional medical care. Always follow your healthcare professional's instructions.        Uterine Fibroids    Fibroids are lumps (growths) of muscle tissue. They can form in the wall of uterus (womb). Fibroids are very common. They are almost always benign (noncancerous). It is not known what causes fibroids. But they may run in families. Also, changes in female hormones may cause them to grow over time. After menopause, fibroids may stop growing, shrink, or go away.  Fibroids may or may not cause symptoms. This depends on many factors, such as the size, number, and locations of the fibroids. If symptoms do occur, they can include:  · Heavy bleeding (in severe cases, this may lead to a problem called anemia) or painful periods  · Feeling of fullness, pressure, or pain in the lower belly (abdomen) or pelvic region  · Lower back pain  · Frequent urination  · Constipation  · Pain during sex  · Problems getting pregnant  If fibroids are suspected, an evaluation will be done to help understand the extent of the problem. This can include a health history, exam, and tests. Based on the results, treatment can then be planned in needed.  Until more details are known about your problem, you may be given guidelines similar to the home care instructions below.      Home care  · To help control pain, over-the-counter pain medicine may be advised. Take these only as directed by your provider.  · If you have heavy or painful periods, keep a log of your menstrual cycle. Show the log to your provider. The information may help him or her determine if your symptoms are due to fibroids or another cause.  · Make certain lifestyle changes. This may include losing excess weight, being more active, or eating less red meat. These changes may help lower the risk of fibroids in some people. They may also help improve overall  health.  Follow-up care  Follow up with your healthcare provider as advised. If testing was done, youll be told the results when they are ready. Treatment options for fibroids may include medicines or procedures to help shrink or keep fibroids from growing. In severe cases, surgery may be needed to remove fibroids or to remove the uterus. If you have fibroids but no symptoms, you may not need treatment at all. However, your provider may advise regular follow-up to check fibroid size and growth.  When to seek medical advice  Call your healthcare provider right away if any of these occur:  · Heavy bleeding or painful periods that continue or dont get better with treatment  · Signs of anemia such as extreme fatigue, pale skin, shortness of breath with little exertion, or rapid heartbeat  · Weakness, dizziness, or fainting  · Severe pain in the pelvic or belly region  · Swollen or enlarged belly  Date Last Reviewed: 6/22/2015  © 1423-0627 [employer]. [address] [place] [address], [place] [address]. All rights reserved. This information is not intended as a substitute for professional medical care. Always follow your healthcare professional's instructions.

## 2018-01-18 NOTE — PROGRESS NOTES
Subjective:       Patient ID: Fadumo Barnhart is a 42 y.o. female.    Chief Complaint: Abdominal Pain (pt states fibroid on the left side abdomen )    Abdominal Pain   This is a recurrent problem. The current episode started in the past 7 days (since Tuesday). The onset quality is gradual. The problem occurs intermittently. The problem has been gradually worsening. The pain is located in the LLQ. The pain is at a severity of 4/10. The quality of the pain is aching. The abdominal pain does not radiate. Pertinent negatives include no constipation, diarrhea, fever, hematuria, nausea or vomiting. Exacerbated by: full bladder. The pain is relieved by nothing. Treatments tried: NSAIDs. The treatment provided mild relief. There is no history of irritable bowel syndrome.       Past Medical History:   Diagnosis Date    ADD (attention deficit disorder with hyperactivity)     ADHD (attention deficit hyperactivity disorder)     Anemia     Anxiety     Depression     GERD (gastroesophageal reflux disease)        Social History     Social History    Marital status:      Spouse name: N/A    Number of children: N/A    Years of education: N/A     Occupational History     [employer]     Social History Main Topics    Smoking status: Never Smoker    Smokeless tobacco: Never Used    Alcohol use Yes    Drug use: No    Sexual activity: Yes     Partners: Male     Other Topics Concern    Not on file     Social History Narrative    No narrative on file       Past Surgical History:   Procedure Laterality Date    GASTRIC BYPASS      LEEP      SMALL INTESTINE SURGERY      intestine were twisted    TONSILLECTOMY         Review of Systems   Constitutional: Negative for fever.   Gastrointestinal: Positive for abdominal pain. Negative for blood in stool, constipation, diarrhea, nausea and vomiting.   Genitourinary: Negative for hematuria.   All other systems reviewed and are negative.      Objective:  "  BP 98/70 (BP Location: Left arm, Patient Position: Sitting, BP Method: Large (Manual))   Pulse 100   Temp 97.7 °F (36.5 °C) (Oral)   Ht 5' 4.75" (1.645 m)   Wt 80.8 kg (178 lb 2.1 oz)   LMP 01/16/2018   SpO2 99%   BMI 29.87 kg/m²      Physical Exam   Constitutional: She is oriented to person, place, and time. She appears well-developed and well-nourished.   HENT:   Head: Normocephalic and atraumatic.   Cardiovascular: Normal rate, regular rhythm and normal heart sounds.    Pulmonary/Chest: Effort normal and breath sounds normal. No respiratory distress. She has no decreased breath sounds. She has no wheezes. She has no rhonchi. She has no rales.   Abdominal: Soft. Bowel sounds are normal. She exhibits no distension and no mass. There is tenderness in the suprapubic area and left lower quadrant. There is no rigidity and no guarding.   Neurological: She is alert and oriented to person, place, and time.   Skin: She is not diaphoretic. No pallor.   Psychiatric: She has a normal mood and affect. Her speech is normal and behavior is normal.       Assessment:       1. Uterine leiomyoma, unspecified location    2. Pelvic pain in female        Plan:       Fadumo was seen today for abdominal pain.    Diagnoses and all orders for this visit:    Uterine leiomyoma, unspecified location  · To help control pain, over-the-counter pain medicine may be advised. Take these only as directed by your provider.  · If you have heavy or painful periods, keep a log of your menstrual cycle. Show the log to your provider. The information may help him or her determine if your symptoms are due to fibroids or another cause.  · Make certain lifestyle changes. This may include losing excess weight, being more active, or eating less red meat. These changes may help lower the risk of fibroids in some people. They may also help improve overall health.    Pelvic pain in female  -     ibuprofen (ADVIL,MOTRIN) 800 MG tablet; Take 1 tablet (800 mg " total) by mouth 3 (three) times daily.  -     traMADol (ULTRAM) 50 mg tablet; Take 1 tablet (50 mg total) by mouth every 6 (six) hours as needed for Pain.          Follow-up if symptoms worsen or fail to improve.

## 2018-02-12 DIAGNOSIS — F90.9 ATTENTION DEFICIT HYPERACTIVITY DISORDER (ADHD), UNSPECIFIED ADHD TYPE: ICD-10-CM

## 2018-02-12 DIAGNOSIS — R10.9 ABDOMINAL PAIN, UNSPECIFIED ABDOMINAL LOCATION: Primary | ICD-10-CM

## 2018-02-12 NOTE — TELEPHONE ENCOUNTER
Patient c/o of pain in the abdomen,she states she has the pain at the onset of her menstrual cycle.

## 2018-02-12 NOTE — TELEPHONE ENCOUNTER
----- Message from Cecil Cleveland sent at 2/12/2018 10:51 AM CST -----  Contact: Self  REFILL: traMADol (ULTRAM) 50 mg tablet  ibuprofen (ADVIL,MOTRIN) 800 MG tablet  dextroamphetamine-amphetamine (ADDERALL XR) 25 MG 24 hr capsule    PHARMACY: [facility]      Pt can be reached @ [phone].

## 2018-02-14 RX ORDER — IBUPROFEN 800 MG/1
800 TABLET ORAL 3 TIMES DAILY
Qty: 30 TABLET | Refills: 0 | Status: SHIPPED | OUTPATIENT
Start: 2018-02-14 | End: 2018-08-16 | Stop reason: SDUPTHER

## 2018-02-14 RX ORDER — DEXTROAMPHETAMINE SACCHARATE, AMPHETAMINE ASPARTATE MONOHYDRATE, DEXTROAMPHETAMINE SULFATE AND AMPHETAMINE SULFATE 6.25; 6.25; 6.25; 6.25 MG/1; MG/1; MG/1; MG/1
25 CAPSULE, EXTENDED RELEASE ORAL EVERY MORNING
Qty: 30 CAPSULE | Refills: 0 | OUTPATIENT
Start: 2018-02-14

## 2018-02-14 RX ORDER — TRAMADOL HYDROCHLORIDE 50 MG/1
50 TABLET ORAL EVERY 6 HOURS PRN
Qty: 12 TABLET | Refills: 0 | OUTPATIENT
Start: 2018-02-14 | End: 2018-02-24

## 2018-04-06 DIAGNOSIS — F90.9 ATTENTION DEFICIT HYPERACTIVITY DISORDER (ADHD), UNSPECIFIED ADHD TYPE: ICD-10-CM

## 2018-04-06 RX ORDER — DEXTROAMPHETAMINE SACCHARATE, AMPHETAMINE ASPARTATE MONOHYDRATE, DEXTROAMPHETAMINE SULFATE AND AMPHETAMINE SULFATE 6.25; 6.25; 6.25; 6.25 MG/1; MG/1; MG/1; MG/1
25 CAPSULE, EXTENDED RELEASE ORAL EVERY MORNING
Qty: 30 CAPSULE | Refills: 0 | Status: CANCELLED | OUTPATIENT
Start: 2018-04-06

## 2018-04-06 NOTE — TELEPHONE ENCOUNTER
----- Message from Stephanie Patel sent at 4/6/2018  9:42 AM CDT -----  Contact: self  Patient would like to  a RX for dextroamphetamine-amphetamine . Patient can be reached at [phone].      Thanks,

## 2018-04-07 ENCOUNTER — OFFICE VISIT (OUTPATIENT)
Dept: FAMILY MEDICINE | Facility: CLINIC | Age: 43
End: 2018-04-07
Payer: COMMERCIAL

## 2018-04-07 ENCOUNTER — LAB VISIT (OUTPATIENT)
Dept: LAB | Facility: HOSPITAL | Age: 43
End: 2018-04-07
Attending: FAMILY MEDICINE
Payer: COMMERCIAL

## 2018-04-07 VITALS
OXYGEN SATURATION: 99 % | SYSTOLIC BLOOD PRESSURE: 116 MMHG | DIASTOLIC BLOOD PRESSURE: 76 MMHG | HEIGHT: 64 IN | BODY MASS INDEX: 29.84 KG/M2 | HEART RATE: 69 BPM | TEMPERATURE: 98 F | WEIGHT: 174.81 LBS

## 2018-04-07 DIAGNOSIS — Z00.00 ANNUAL PHYSICAL EXAM: ICD-10-CM

## 2018-04-07 DIAGNOSIS — D25.9 UTERINE LEIOMYOMA, UNSPECIFIED LOCATION: ICD-10-CM

## 2018-04-07 DIAGNOSIS — K21.9 GASTROESOPHAGEAL REFLUX DISEASE WITHOUT ESOPHAGITIS: ICD-10-CM

## 2018-04-07 DIAGNOSIS — Z00.00 ANNUAL PHYSICAL EXAM: Primary | ICD-10-CM

## 2018-04-07 DIAGNOSIS — F90.9 ATTENTION DEFICIT HYPERACTIVITY DISORDER (ADHD), UNSPECIFIED ADHD TYPE: ICD-10-CM

## 2018-04-07 LAB
ALBUMIN SERPL BCP-MCNC: 3.9 G/DL
ALP SERPL-CCNC: 106 U/L
ALT SERPL W/O P-5'-P-CCNC: 15 U/L
ANION GAP SERPL CALC-SCNC: 10 MMOL/L
AST SERPL-CCNC: 21 U/L
BASOPHILS # BLD AUTO: 0.01 K/UL
BASOPHILS NFR BLD: 0.2 %
BILIRUB SERPL-MCNC: 0.8 MG/DL
BUN SERPL-MCNC: 12 MG/DL
CALCIUM SERPL-MCNC: 9.4 MG/DL
CHLORIDE SERPL-SCNC: 104 MMOL/L
CHOLEST SERPL-MCNC: 175 MG/DL
CHOLEST/HDLC SERPL: 2.4 {RATIO}
CO2 SERPL-SCNC: 28 MMOL/L
CREAT SERPL-MCNC: 0.9 MG/DL
DIFFERENTIAL METHOD: ABNORMAL
EOSINOPHIL # BLD AUTO: 0.1 K/UL
EOSINOPHIL NFR BLD: 3.2 %
ERYTHROCYTE [DISTWIDTH] IN BLOOD BY AUTOMATED COUNT: 13.5 %
EST. GFR  (AFRICAN AMERICAN): >60 ML/MIN/1.73 M^2
EST. GFR  (NON AFRICAN AMERICAN): >60 ML/MIN/1.73 M^2
ESTIMATED AVG GLUCOSE: 91 MG/DL
GLUCOSE SERPL-MCNC: 90 MG/DL
HBA1C MFR BLD HPLC: 4.8 %
HCT VFR BLD AUTO: 40 %
HDLC SERPL-MCNC: 74 MG/DL
HDLC SERPL: 42.3 %
HGB BLD-MCNC: 12.4 G/DL
IMM GRANULOCYTES # BLD AUTO: 0.01 K/UL
IMM GRANULOCYTES NFR BLD AUTO: 0.2 %
LDLC SERPL CALC-MCNC: 88.4 MG/DL
LYMPHOCYTES # BLD AUTO: 1.8 K/UL
LYMPHOCYTES NFR BLD: 44.5 %
MCH RBC QN AUTO: 28.5 PG
MCHC RBC AUTO-ENTMCNC: 31 G/DL
MCV RBC AUTO: 92 FL
MONOCYTES # BLD AUTO: 0.4 K/UL
MONOCYTES NFR BLD: 10 %
NEUTROPHILS # BLD AUTO: 1.7 K/UL
NEUTROPHILS NFR BLD: 41.9 %
NONHDLC SERPL-MCNC: 101 MG/DL
NRBC BLD-RTO: 0 /100 WBC
PLATELET # BLD AUTO: 309 K/UL
PMV BLD AUTO: 10.3 FL
POTASSIUM SERPL-SCNC: 4.3 MMOL/L
PROT SERPL-MCNC: 8.2 G/DL
RBC # BLD AUTO: 4.35 M/UL
SODIUM SERPL-SCNC: 142 MMOL/L
TRIGL SERPL-MCNC: 63 MG/DL
TSH SERPL DL<=0.005 MIU/L-ACNC: 1.09 UIU/ML
WBC # BLD AUTO: 4.11 K/UL

## 2018-04-07 PROCEDURE — 83036 HEMOGLOBIN GLYCOSYLATED A1C: CPT

## 2018-04-07 PROCEDURE — 99396 PREV VISIT EST AGE 40-64: CPT | Mod: S$GLB,,, | Performed by: FAMILY MEDICINE

## 2018-04-07 PROCEDURE — 84443 ASSAY THYROID STIM HORMONE: CPT

## 2018-04-07 PROCEDURE — 80061 LIPID PANEL: CPT

## 2018-04-07 PROCEDURE — 80053 COMPREHEN METABOLIC PANEL: CPT

## 2018-04-07 PROCEDURE — 36415 COLL VENOUS BLD VENIPUNCTURE: CPT | Mod: PO

## 2018-04-07 PROCEDURE — 85025 COMPLETE CBC W/AUTO DIFF WBC: CPT

## 2018-04-07 PROCEDURE — 99999 PR PBB SHADOW E&M-EST. PATIENT-LVL III: CPT | Mod: PBBFAC,,, | Performed by: FAMILY MEDICINE

## 2018-04-07 RX ORDER — DEXTROAMPHETAMINE SACCHARATE, AMPHETAMINE ASPARTATE MONOHYDRATE, DEXTROAMPHETAMINE SULFATE AND AMPHETAMINE SULFATE 6.25; 6.25; 6.25; 6.25 MG/1; MG/1; MG/1; MG/1
25 CAPSULE, EXTENDED RELEASE ORAL EVERY MORNING
Qty: 30 CAPSULE | Refills: 0 | Status: SHIPPED | OUTPATIENT
Start: 2018-04-07 | End: 2018-08-16 | Stop reason: SDUPTHER

## 2018-04-07 RX ORDER — DEXTROAMPHETAMINE SACCHARATE, AMPHETAMINE ASPARTATE MONOHYDRATE, DEXTROAMPHETAMINE SULFATE AND AMPHETAMINE SULFATE 6.25; 6.25; 6.25; 6.25 MG/1; MG/1; MG/1; MG/1
25 CAPSULE, EXTENDED RELEASE ORAL EVERY MORNING
Qty: 30 CAPSULE | Refills: 0 | Status: SHIPPED | OUTPATIENT
Start: 2018-05-07 | End: 2018-06-06

## 2018-04-07 RX ORDER — DEXTROAMPHETAMINE SACCHARATE, AMPHETAMINE ASPARTATE MONOHYDRATE, DEXTROAMPHETAMINE SULFATE AND AMPHETAMINE SULFATE 6.25; 6.25; 6.25; 6.25 MG/1; MG/1; MG/1; MG/1
25 CAPSULE, EXTENDED RELEASE ORAL EVERY MORNING
Qty: 30 CAPSULE | Refills: 0 | Status: SHIPPED | OUTPATIENT
Start: 2018-06-06 | End: 2018-06-25 | Stop reason: SDUPTHER

## 2018-04-07 NOTE — PROGRESS NOTES
Urgent Walk-in Office Visit    Patient Name: Fadumo Barnhart    : 1975  MRN: [id number]    Subjective:  Fadumo is a 42 y.o. female who presents today for Urgent Walk-in visit for:    1. Annual physical  2. Uterine fibroids - pt is discussing care with ob-gyn and wants to have surgical resolution of her fibroids / chronic bleeding. She plans to have surgery in .   3. 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 She does not abuse medication. She does not take more than prescribed. She denies any side effect from medications such as weight loss, hypertension, chest pain, heart palpitations or anxiety. 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        Review of Systems   Constitutional: Negative for chills and fever.   HENT: Negative for congestion.    Eyes: Negative for blurred vision.   Respiratory: Negative for cough.    Cardiovascular: Negative for chest pain.   Gastrointestinal: Negative for abdominal pain, constipation, diarrhea, heartburn, nausea and vomiting.   Genitourinary: Negative for dysuria.   Musculoskeletal: Negative for myalgias.   Skin: Negative for itching and rash.   Neurological: Negative for dizziness and headaches.   Psychiatric/Behavioral: Negative for depression.       Active Problem List  Patient Active Problem List   Diagnosis    ADHD (attention deficit hyperactivity disorder)    GERD (gastroesophageal reflux disease)       Past Surgical History  Past Surgical History:   Procedure Laterality Date    GASTRIC BYPASS      LEEP      SMALL INTESTINE SURGERY      intestine were twisted    TONSILLECTOMY         Family History  Family History   Problem Relation Age of Onset    Diabetes Mother     Heart disease Mother     Hypertension Mother     Kidney disease Mother     Miscarriages / Stillbirths Mother     Vision loss Mother     Hypertension Father     Alcohol  "abuse Father      prior, not current    Alcohol abuse Brother     Drug abuse Brother     Learning disabilities Brother      ADD/ADHD, dyslexia    Alcohol abuse Paternal Uncle     Cancer Paternal Uncle     Depression Paternal Uncle     Early death Paternal Uncle     Diabetes Maternal Aunt     Miscarriages / Stillbirths Maternal Aunt     Diabetes Maternal Uncle     Kidney disease Maternal Uncle        Social History  Social History     Social History    Marital status:      Spouse name: N/A    Number of children: N/A    Years of education: N/A     Occupational History     [employer]     Social History Main Topics    Smoking status: Never Smoker    Smokeless tobacco: Never Used    Alcohol use Yes    Drug use: No    Sexual activity: Yes     Partners: Male     Other Topics Concern    Not on file     Social History Narrative    No narrative on file       Medications and Allergies  Reviewed and updated.       Physical Exam  /76   Pulse 69   Temp 98.3 °F (36.8 °C) (Oral)   Ht 5' 4" (1.626 m)   Wt 79.3 kg (174 lb 13.2 oz)   LMP 04/02/2018   SpO2 99%   BMI 30.01 kg/m²   Physical Exam   Constitutional: She is oriented to person, place, and time. She appears well-developed and well-nourished.   HENT:   Head: Normocephalic and atraumatic.   Eyes: Conjunctivae and EOM are normal. Pupils are equal, round, and reactive to light.   Neck: Normal range of motion. Neck supple.   Cardiovascular: Normal rate, regular rhythm and normal heart sounds.  Exam reveals no gallop and no friction rub.    No murmur heard.  Pulmonary/Chest: Breath sounds normal. No respiratory distress.   Abdominal: Soft. Bowel sounds are normal. She exhibits no distension. There is no tenderness.   Musculoskeletal: Normal range of motion.   Lymphadenopathy:     She has no cervical adenopathy.   Neurological: She is alert and oriented to person, place, and time.   Skin: Skin is warm.   Psychiatric: She has a " normal mood and affect.         Assessment/Plan:  Fadumo Barnhart is a 42 y.o. female who presents today for :    Annual physical exam  -     CBC auto differential; Future; Expected date: 04/07/2018  -     Comprehensive metabolic panel; Future; Expected date: 04/07/2018  -     Lipid panel; Future; Expected date: 04/07/2018  -     Hemoglobin A1c; Future; Expected date: 04/07/2018  -     TSH; Future; Expected date: 04/07/2018  Health Maintenance       Date Due Completion Date    Pap Smear with HPV Cotest 03/27/2017 3/27/2014 (Declined)    Override on 3/27/2014: Declined (was done last year by OB-GYN 12/2013)    Mammogram 03/22/2018 3/22/2016    TETANUS VACCINE 12/16/2023 12/16/2013      Seeing ob-gyn    Attention deficit hyperactivity disorder (ADHD), unspecified ADHD type  -     dextroamphetamine-amphetamine (ADDERALL XR) 25 MG 24 hr capsule; Take 1 capsule (25 mg total) by mouth every morning.  Dispense: 30 capsule; Refill: 0  -     dextroamphetamine-amphetamine (ADDERALL XR) 25 MG 24 hr capsule; Take 1 capsule (25 mg total) by mouth every morning.  Dispense: 30 capsule; Refill: 0  -     dextroamphetamine-amphetamine (ADDERALL XR) 25 MG 24 hr capsule; Take 1 capsule (25 mg total) by mouth every morning.  Dispense: 30 capsule; Refill: 0  The current medical regimen is effective;  continue present plan and medications.    Gastroesophageal reflux disease without esophagitis  The current medical regimen is effective;  continue present plan and medications.    Uterine leiomyoma, unspecified location  F/u with ob-gyn      Follow-up in about 3 months (around 7/7/2018), or if symptoms worsen or fail to improve.

## 2018-06-25 DIAGNOSIS — F90.9 ATTENTION DEFICIT HYPERACTIVITY DISORDER (ADHD), UNSPECIFIED ADHD TYPE: ICD-10-CM

## 2018-06-25 RX ORDER — DEXTROAMPHETAMINE SACCHARATE, AMPHETAMINE ASPARTATE MONOHYDRATE, DEXTROAMPHETAMINE SULFATE AND AMPHETAMINE SULFATE 6.25; 6.25; 6.25; 6.25 MG/1; MG/1; MG/1; MG/1
25 CAPSULE, EXTENDED RELEASE ORAL EVERY MORNING
Qty: 30 CAPSULE | Refills: 0 | Status: SHIPPED | OUTPATIENT
Start: 2018-06-25 | End: 2018-07-25

## 2018-06-25 NOTE — TELEPHONE ENCOUNTER
----- Message from Kamar Pierre sent at 6/25/2018  3:13 PM CDT -----  Contact: Fadumo [phone]  REFILL: dextroamphetamine-amphetamine (ADDERALL XR) 25 MG 24 hr capsule, ibuprofen    PHARMACY: (# FROM FACILITY#)

## 2018-06-29 DIAGNOSIS — Z12.39 BREAST CANCER SCREENING: ICD-10-CM

## 2018-08-01 DIAGNOSIS — F90.9 ATTENTION DEFICIT HYPERACTIVITY DISORDER (ADHD), UNSPECIFIED ADHD TYPE: ICD-10-CM

## 2018-08-01 RX ORDER — PANTOPRAZOLE SODIUM 40 MG/1
40 TABLET, DELAYED RELEASE ORAL DAILY
Qty: 30 TABLET | Refills: 11 | Status: SHIPPED | OUTPATIENT
Start: 2018-08-01 | End: 2019-08-14 | Stop reason: SDUPTHER

## 2018-08-01 RX ORDER — PANTOPRAZOLE SODIUM 40 MG/1
TABLET, DELAYED RELEASE ORAL
Refills: 1 | COMMUNITY
Start: 2018-06-29 | End: 2018-08-16 | Stop reason: SDUPTHER

## 2018-08-01 NOTE — TELEPHONE ENCOUNTER
----- Message from Doretha Osman sent at 8/1/2018  8:54 AM CDT -----  Contact: self  Pt calling to request a refill of dextroamphetamine-amphetamine (ADDERALL XR) 25 MG 24 hr capsule. Please call pt to [phone].

## 2018-08-16 ENCOUNTER — OFFICE VISIT (OUTPATIENT)
Dept: FAMILY MEDICINE | Facility: CLINIC | Age: 43
End: 2018-08-16
Payer: COMMERCIAL

## 2018-08-16 VITALS
WEIGHT: 172.38 LBS | BODY MASS INDEX: 29.43 KG/M2 | DIASTOLIC BLOOD PRESSURE: 72 MMHG | SYSTOLIC BLOOD PRESSURE: 110 MMHG | HEIGHT: 64 IN | HEART RATE: 78 BPM | OXYGEN SATURATION: 99 % | TEMPERATURE: 99 F

## 2018-08-16 DIAGNOSIS — F41.9 ANXIETY: ICD-10-CM

## 2018-08-16 DIAGNOSIS — K21.9 GASTROESOPHAGEAL REFLUX DISEASE WITHOUT ESOPHAGITIS: ICD-10-CM

## 2018-08-16 DIAGNOSIS — R10.9 ABDOMINAL PAIN, UNSPECIFIED ABDOMINAL LOCATION: ICD-10-CM

## 2018-08-16 DIAGNOSIS — F90.9 ATTENTION DEFICIT HYPERACTIVITY DISORDER (ADHD), UNSPECIFIED ADHD TYPE: Primary | ICD-10-CM

## 2018-08-16 PROCEDURE — 99215 OFFICE O/P EST HI 40 MIN: CPT | Mod: S$GLB,,, | Performed by: FAMILY MEDICINE

## 2018-08-16 PROCEDURE — 99999 PR PBB SHADOW E&M-EST. PATIENT-LVL III: CPT | Mod: PBBFAC,,, | Performed by: FAMILY MEDICINE

## 2018-08-16 RX ORDER — DEXTROAMPHETAMINE SACCHARATE, AMPHETAMINE ASPARTATE MONOHYDRATE, DEXTROAMPHETAMINE SULFATE AND AMPHETAMINE SULFATE 6.25; 6.25; 6.25; 6.25 MG/1; MG/1; MG/1; MG/1
25 CAPSULE, EXTENDED RELEASE ORAL EVERY MORNING
Qty: 30 CAPSULE | Refills: 0 | Status: SHIPPED | OUTPATIENT
Start: 2018-08-20 | End: 2018-11-19

## 2018-08-16 RX ORDER — LORAZEPAM 0.5 MG/1
0.5 TABLET ORAL DAILY PRN
Qty: 10 TABLET | Refills: 0 | Status: SHIPPED | OUTPATIENT
Start: 2018-08-16 | End: 2019-07-26

## 2018-08-16 RX ORDER — IBUPROFEN 800 MG/1
800 TABLET ORAL 3 TIMES DAILY
Qty: 30 TABLET | Refills: 0 | Status: SHIPPED | OUTPATIENT
Start: 2018-08-16 | End: 2019-10-11 | Stop reason: SDUPTHER

## 2018-08-16 RX ORDER — DEXTROAMPHETAMINE SACCHARATE, AMPHETAMINE ASPARTATE MONOHYDRATE, DEXTROAMPHETAMINE SULFATE AND AMPHETAMINE SULFATE 6.25; 6.25; 6.25; 6.25 MG/1; MG/1; MG/1; MG/1
25 CAPSULE, EXTENDED RELEASE ORAL EVERY MORNING
Qty: 30 CAPSULE | Refills: 0 | Status: SHIPPED | OUTPATIENT
Start: 2018-09-19 | End: 2018-10-19

## 2018-08-16 RX ORDER — DEXTROAMPHETAMINE SACCHARATE, AMPHETAMINE ASPARTATE MONOHYDRATE, DEXTROAMPHETAMINE SULFATE AND AMPHETAMINE SULFATE 6.25; 6.25; 6.25; 6.25 MG/1; MG/1; MG/1; MG/1
25 CAPSULE, EXTENDED RELEASE ORAL EVERY MORNING
Qty: 30 CAPSULE | Refills: 0 | Status: SHIPPED | OUTPATIENT
Start: 2018-10-19 | End: 2018-11-19

## 2018-08-16 NOTE — PROGRESS NOTES
Routine Office Visit    Patient Name: Fadumo Barnhart    : 1975  MRN: [id number]    Subjective:  Fadumo is a 43 y.o. female who presents today for     1. 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 She does not abuse medication. She does not take more than prescribed. She denies any side effect from medications such as weight loss, hypertension, chest pain, heart palpitations or anxiety. 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   2. Anxiety - pt had recent death of close family friend. She has been crying at work and has been trying to help comfort others who are also seeking her to know more information about her friend who  of cancer. She states she feels she needs help. She had lorazepam in the past which some relief of symptoms. She does not take the lorazepam with her adderall. She has been seeing a therapist and has found this has helped. She needs a few days supply until the .       Review of Systems   Constitutional: Negative for chills and fever.   HENT: Negative for congestion.    Eyes: Negative for blurred vision.   Respiratory: Negative for cough.    Cardiovascular: Negative for chest pain.   Gastrointestinal: Negative for abdominal pain, constipation, diarrhea, heartburn, nausea and vomiting.   Genitourinary: Negative for dysuria.   Musculoskeletal: Negative for myalgias.   Skin: Negative for itching and rash.   Neurological: Negative for dizziness and headaches.   Psychiatric/Behavioral: Negative for depression.       Active Problem List  Patient Active Problem List   Diagnosis    ADHD (attention deficit hyperactivity disorder)    GERD (gastroesophageal reflux disease)       Past Surgical History  Past Surgical History:   Procedure Laterality Date    GASTRIC BYPASS      LEEP      SMALL INTESTINE SURGERY       intestine were twisted    TONSILLECTOMY         Family History  Family History   Problem Relation Age of Onset    Diabetes Mother     Heart disease Mother     Hypertension Mother     Kidney disease Mother     Miscarriages / Stillbirths Mother     Vision loss Mother     Hypertension Father     Alcohol abuse Father         prior, not current    Alcohol abuse Brother     Drug abuse Brother     Learning disabilities Brother         ADD/ADHD, dyslexia    Alcohol abuse Paternal Uncle     Cancer Paternal Uncle     Depression Paternal Uncle     Early death Paternal Uncle     Diabetes Maternal Aunt     Miscarriages / Stillbirths Maternal Aunt     Diabetes Maternal Uncle     Kidney disease Maternal Uncle        Social History  Social History     Socioeconomic History    Marital status:      Spouse name: Not on file    Number of children: Not on file    Years of education: Not on file    Highest education level: Not on file   Social Needs    Financial resource strain: Not on file    Food insecurity - worry: Not on file    Food insecurity - inability: Not on file    Transportation needs - medical: Not on file    Transportation needs - non-medical: Not on file   Occupational History     Employer: [employer] [place]   Tobacco Use    Smoking status: Never Smoker    Smokeless tobacco: Never Used   Substance and Sexual Activity    Alcohol use: Yes    Drug use: No    Sexual activity: Yes     Partners: Male   Other Topics Concern    Not on file   Social History Narrative    Not on file       Medications and Allergies  Reviewed and updated.   Current Outpatient Medications   Medication Sig    [START ON 9/19/2018] dextroamphetamine-amphetamine (ADDERALL XR) 25 MG 24 hr capsule Take 1 capsule (25 mg total) by mouth every morning.    [START ON 8/20/2018] dextroamphetamine-amphetamine (ADDERALL XR) 25 MG 24 hr capsule Take 1 capsule (25 mg total) by mouth every morning.    [START ON  "10/19/2018] dextroamphetamine-amphetamine (ADDERALL XR) 25 MG 24 hr capsule Take 1 capsule (25 mg total) by mouth every morning.    ibuprofen (ADVIL,MOTRIN) 800 MG tablet Take 1 tablet (800 mg total) by mouth 3 (three) times daily.    pantoprazole (PROTONIX) 40 MG tablet Take 1 tablet (40 mg total) by mouth once daily.    esomeprazole (NEXIUM) 20 MG capsule Take 1 capsule (20 mg total) by mouth before breakfast.    LORazepam (ATIVAN) 0.5 MG tablet Take 1 tablet (0.5 mg total) by mouth daily as needed for Anxiety.     No current facility-administered medications for this visit.        Physical Exam  /72 (BP Location: Right arm, Patient Position: Sitting, BP Method: Large (Manual))   Pulse 78   Temp 98.9 °F (37.2 °C) (Oral)   Ht 5' 4" (1.626 m)   Wt 78.2 kg (172 lb 6.4 oz)   SpO2 99%   BMI 29.59 kg/m²   Physical Exam   Constitutional: She is oriented to person, place, and time. She appears well-developed and well-nourished.   HENT:   Head: Normocephalic and atraumatic.   Eyes: Conjunctivae and EOM are normal. Pupils are equal, round, and reactive to light.   Neck: Normal range of motion. Neck supple.   Cardiovascular: Normal rate, regular rhythm and normal heart sounds. Exam reveals no gallop and no friction rub.   No murmur heard.  Pulmonary/Chest: Breath sounds normal. No respiratory distress.   Abdominal: Soft. Bowel sounds are normal. She exhibits no distension. There is no tenderness.   Musculoskeletal: Normal range of motion.   Lymphadenopathy:     She has no cervical adenopathy.   Neurological: She is alert and oriented to person, place, and time.   Skin: Skin is warm.   Psychiatric: She has a normal mood and affect.         Assessment/Plan:  Fadumo Barnhart is a 43 y.o. female who presents today for :    Problem List Items Addressed This Visit        Psychiatric    ADHD (attention deficit hyperactivity disorder) - Primary    Relevant Medications    dextroamphetamine-amphetamine (ADDERALL XR) " 25 MG 24 hr capsule (Start on 9/19/2018)    dextroamphetamine-amphetamine (ADDERALL XR) 25 MG 24 hr capsule (Start on 8/20/2018)    dextroamphetamine-amphetamine (ADDERALL XR) 25 MG 24 hr capsule (Start on 10/19/2018)  Reviewed [place]   The current medical regimen is effective;  continue present plan and medications.         GI    GERD (gastroesophageal reflux disease)  The current medical regimen is effective;  continue present plan and medications.        Other Visit Diagnoses     Abdominal pain, unspecified abdominal location        Relevant Medications    ibuprofen (ADVIL,MOTRIN) 800 MG tablet  The current medical regimen is effective;  continue present plan and medications.      Anxiety        Relevant Medications    LORazepam (ATIVAN) 0.5 MG tablet  The current medical regimen is effective;  continue present plan and medications.            Reviewed LA   Advised against taking lorazepam with adderall - pt expresses understanding regarding danger and abuse potential of two medications / interactions     Follow-up in about 3 months (around 11/16/2018), or if symptoms worsen or fail to improve.

## 2018-11-14 ENCOUNTER — OFFICE VISIT (OUTPATIENT)
Dept: FAMILY MEDICINE | Facility: CLINIC | Age: 43
End: 2018-11-14
Payer: COMMERCIAL

## 2018-11-14 VITALS
WEIGHT: 170.44 LBS | HEART RATE: 74 BPM | SYSTOLIC BLOOD PRESSURE: 120 MMHG | TEMPERATURE: 98 F | OXYGEN SATURATION: 99 % | DIASTOLIC BLOOD PRESSURE: 80 MMHG | BODY MASS INDEX: 29.1 KG/M2 | HEIGHT: 64 IN

## 2018-11-14 DIAGNOSIS — M25.511 ACUTE PAIN OF RIGHT SHOULDER: ICD-10-CM

## 2018-11-14 DIAGNOSIS — M54.50 LUMBAR BACK PAIN: ICD-10-CM

## 2018-11-14 DIAGNOSIS — S09.90XD HEAD INJURY, CLOSED, SUBSEQUENT ENCOUNTER: ICD-10-CM
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
## 2018-11-14 PROCEDURE — 99214 OFFICE O/P EST MOD 30 MIN: CPT | Mod: S$GLB,,, | Performed by: NURSE PRACTITIONER

## 2018-11-14 PROCEDURE — 99999 PR PBB SHADOW E&M-EST. PATIENT-LVL IV: CPT | Mod: PBBFAC,,, | Performed by: NURSE PRACTITIONER

## 2018-11-14 RX ORDER — MECLIZINE HCL 12.5 MG 12.5 MG/1
TABLET ORAL
COMMUNITY
Start: 2018-11-12 | End: 2019-07-26
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
## 2018-11-19 ENCOUNTER — OFFICE VISIT (OUTPATIENT)
Dept: FAMILY MEDICINE | Facility: CLINIC | Age: 43
End: 2018-11-19
Payer: COMMERCIAL

## 2018-11-19 VITALS
OXYGEN SATURATION: 99 % | DIASTOLIC BLOOD PRESSURE: 80 MMHG | WEIGHT: 172.63 LBS | HEART RATE: 72 BPM | TEMPERATURE: 98 F | HEIGHT: 64 IN | BODY MASS INDEX: 29.47 KG/M2 | SYSTOLIC BLOOD PRESSURE: 120 MMHG

## 2018-11-19 DIAGNOSIS — K21.9 GASTROESOPHAGEAL REFLUX DISEASE WITHOUT ESOPHAGITIS: ICD-10-CM

## 2018-11-19 DIAGNOSIS — F90.9 ATTENTION DEFICIT HYPERACTIVITY DISORDER (ADHD), UNSPECIFIED ADHD TYPE: Primary | ICD-10-CM

## 2018-11-19 PROCEDURE — 99999 PR PBB SHADOW E&M-EST. PATIENT-LVL III: CPT | Mod: PBBFAC,,, | Performed by: FAMILY MEDICINE

## 2018-11-19 PROCEDURE — 99214 OFFICE O/P EST MOD 30 MIN: CPT | Mod: S$GLB,,, | Performed by: FAMILY MEDICINE

## 2018-11-19 RX ORDER — DEXTROAMPHETAMINE SACCHARATE, AMPHETAMINE ASPARTATE MONOHYDRATE, DEXTROAMPHETAMINE SULFATE AND AMPHETAMINE SULFATE 5; 5; 5; 5 MG/1; MG/1; MG/1; MG/1
20 CAPSULE, EXTENDED RELEASE ORAL EVERY MORNING
Qty: 30 CAPSULE | Refills: 0 | Status: SHIPPED | OUTPATIENT
Start: 2018-01-22 | End: 2018-11-19

## 2018-11-19 RX ORDER — DEXTROAMPHETAMINE SACCHARATE, AMPHETAMINE ASPARTATE MONOHYDRATE, DEXTROAMPHETAMINE SULFATE AND AMPHETAMINE SULFATE 5; 5; 5; 5 MG/1; MG/1; MG/1; MG/1
20 CAPSULE, EXTENDED RELEASE ORAL EVERY MORNING
Qty: 30 CAPSULE | Refills: 0 | Status: SHIPPED | OUTPATIENT
Start: 2018-11-24 | End: 2018-12-23

## 2018-11-19 RX ORDER — DEXTROAMPHETAMINE SACCHARATE, AMPHETAMINE ASPARTATE MONOHYDRATE, DEXTROAMPHETAMINE SULFATE AND AMPHETAMINE SULFATE 5; 5; 5; 5 MG/1; MG/1; MG/1; MG/1
20 CAPSULE, EXTENDED RELEASE ORAL EVERY MORNING
Qty: 30 CAPSULE | Refills: 0 | Status: SHIPPED | OUTPATIENT
Start: 2019-01-22 | End: 2019-03-07 | Stop reason: SDUPTHER

## 2018-11-19 RX ORDER — DEXTROAMPHETAMINE SACCHARATE, AMPHETAMINE ASPARTATE MONOHYDRATE, DEXTROAMPHETAMINE SULFATE AND AMPHETAMINE SULFATE 5; 5; 5; 5 MG/1; MG/1; MG/1; MG/1
20 CAPSULE, EXTENDED RELEASE ORAL EVERY MORNING
Qty: 30 CAPSULE | Refills: 0 | Status: SHIPPED | OUTPATIENT
Start: 2018-12-23 | End: 2019-01-21

## 2018-11-29 DIAGNOSIS — K21.9 GASTROESOPHAGEAL REFLUX DISEASE WITHOUT ESOPHAGITIS: ICD-10-CM

## 2018-11-30 RX ORDER — HYDROGEN PEROXIDE 3 %
SOLUTION, NON-ORAL MISCELLANEOUS
Qty: 90 CAPSULE | Refills: 0 | Status: SHIPPED | OUTPATIENT
Start: 2018-11-30 | End: 2019-07-26

## 2019-03-04 ENCOUNTER — TELEPHONE (OUTPATIENT)
Dept: FAMILY MEDICINE | Facility: CLINIC | Age: 44
End: 2019-03-04

## 2019-03-04 NOTE — TELEPHONE ENCOUNTER
----- Message from Madie Vail sent at 3/4/2019  2:41 PM CST -----  Contact: Self   Patient states she need a refill on her medication. Patient also states she is off the entire week and would like to come in to see Dr. Chi instead of waiting until the next available on 3/18. Please call patient at [phone]    dextroamphetamine-amphetamine (ADDERALL XR) 20 MG 24 hr capsule      [facility] [address] [place] [address] SCOTT UMAÑA AT [facility]

## 2019-03-07 DIAGNOSIS — F90.9 ATTENTION DEFICIT HYPERACTIVITY DISORDER (ADHD), UNSPECIFIED ADHD TYPE: ICD-10-CM

## 2019-03-07 RX ORDER — DEXTROAMPHETAMINE SACCHARATE, AMPHETAMINE ASPARTATE MONOHYDRATE, DEXTROAMPHETAMINE SULFATE AND AMPHETAMINE SULFATE 5; 5; 5; 5 MG/1; MG/1; MG/1; MG/1
20 CAPSULE, EXTENDED RELEASE ORAL EVERY MORNING
Qty: 30 CAPSULE | Refills: 0 | Status: SHIPPED | OUTPATIENT
Start: 2019-03-07 | End: 2019-03-18 | Stop reason: SDUPTHER

## 2019-03-07 NOTE — TELEPHONE ENCOUNTER
----- Message from Ada Madrigal sent at 3/7/2019  7:16 AM CST -----  Contact: self [phone]  Pt is requesting a refill      dextroamphetamine-amphetamine (ADDERALL XR) 20 MG 24     Pls call ...    [facility] [address] [place] [address] ASHLEY COLE [facility]  [address] [facility]  DOUGLAS RAMIREZ [id number]  Phone: [phone] Fax: [phone]    Thanks......Nina

## 2019-03-18 ENCOUNTER — OFFICE VISIT (OUTPATIENT)
Dept: FAMILY MEDICINE | Facility: CLINIC | Age: 44
End: 2019-03-18
Payer: COMMERCIAL

## 2019-03-18 VITALS
TEMPERATURE: 98 F | DIASTOLIC BLOOD PRESSURE: 70 MMHG | HEIGHT: 64 IN | SYSTOLIC BLOOD PRESSURE: 104 MMHG | HEART RATE: 86 BPM | OXYGEN SATURATION: 95 % | WEIGHT: 169.06 LBS | BODY MASS INDEX: 28.86 KG/M2

## 2019-03-18 DIAGNOSIS — Z23 NEED FOR PROPHYLACTIC VACCINATION AND INOCULATION AGAINST INFLUENZA: ICD-10-CM

## 2019-03-18 DIAGNOSIS — F90.9 ATTENTION DEFICIT HYPERACTIVITY DISORDER (ADHD), UNSPECIFIED ADHD TYPE: Primary | ICD-10-CM

## 2019-03-18 DIAGNOSIS — Z00.00 GENERAL MEDICAL EXAM: ICD-10-CM

## 2019-03-18 DIAGNOSIS — Z12.31 ENCOUNTER FOR SCREENING MAMMOGRAM FOR BREAST CANCER: ICD-10-CM

## 2019-03-18 PROCEDURE — 99214 OFFICE O/P EST MOD 30 MIN: CPT | Mod: 25,S$GLB,, | Performed by: FAMILY MEDICINE

## 2019-03-18 PROCEDURE — 90471 IMMUNIZATION ADMIN: CPT | Mod: S$GLB,,, | Performed by: FAMILY MEDICINE

## 2019-03-18 PROCEDURE — 99999 PR PBB SHADOW E&M-EST. PATIENT-LVL IV: CPT | Mod: PBBFAC,,, | Performed by: FAMILY MEDICINE

## 2019-03-18 PROCEDURE — 90471 FLU VACCINE (QUAD) GREATER THAN OR EQUAL TO 3YO PRESERVATIVE FREE IM: ICD-10-PCS | Mod: S$GLB,,, | Performed by: FAMILY MEDICINE

## 2019-03-18 PROCEDURE — 99999 PR PBB SHADOW E&M-EST. PATIENT-LVL IV: ICD-10-PCS | Mod: PBBFAC,,, | Performed by: FAMILY MEDICINE

## 2019-03-18 PROCEDURE — 99214 PR OFFICE/OUTPT VISIT, EST, LEVL IV, 30-39 MIN: ICD-10-PCS | Mod: 25,S$GLB,, | Performed by: FAMILY MEDICINE

## 2019-03-18 PROCEDURE — 90686 FLU VACCINE (QUAD) GREATER THAN OR EQUAL TO 3YO PRESERVATIVE FREE IM: ICD-10-PCS | Mod: S$GLB,,, | Performed by: FAMILY MEDICINE

## 2019-03-18 PROCEDURE — 90686 IIV4 VACC NO PRSV 0.5 ML IM: CPT | Mod: S$GLB,,, | Performed by: FAMILY MEDICINE

## 2019-03-18 RX ORDER — DEXTROAMPHETAMINE SACCHARATE, AMPHETAMINE ASPARTATE MONOHYDRATE, DEXTROAMPHETAMINE SULFATE AND AMPHETAMINE SULFATE 5; 5; 5; 5 MG/1; MG/1; MG/1; MG/1
20 CAPSULE, EXTENDED RELEASE ORAL EVERY MORNING
Qty: 30 CAPSULE | Refills: 0 | Status: SHIPPED | OUTPATIENT
Start: 2019-05-07 | End: 2019-04-23 | Stop reason: SDUPTHER

## 2019-03-18 RX ORDER — DEXTROAMPHETAMINE SACCHARATE, AMPHETAMINE ASPARTATE MONOHYDRATE, DEXTROAMPHETAMINE SULFATE AND AMPHETAMINE SULFATE 5; 5; 5; 5 MG/1; MG/1; MG/1; MG/1
20 CAPSULE, EXTENDED RELEASE ORAL EVERY MORNING
Qty: 30 CAPSULE | Refills: 0 | Status: SHIPPED | OUTPATIENT
Start: 2019-06-05 | End: 2019-04-23 | Stop reason: SDUPTHER

## 2019-03-18 RX ORDER — DEXTROAMPHETAMINE SACCHARATE, AMPHETAMINE ASPARTATE MONOHYDRATE, DEXTROAMPHETAMINE SULFATE AND AMPHETAMINE SULFATE 5; 5; 5; 5 MG/1; MG/1; MG/1; MG/1
20 CAPSULE, EXTENDED RELEASE ORAL EVERY MORNING
Qty: 30 CAPSULE | Refills: 0 | Status: SHIPPED | OUTPATIENT
Start: 2019-04-08 | End: 2019-04-23 | Stop reason: SDUPTHER

## 2019-03-18 NOTE — PROGRESS NOTES
Routine Office Visit    Patient Name: Fadumo Barnhart    : 1975  MRN: [id number]    Subjective:  Fadumo is a 43 y.o. female who presents today for     1.  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 She does not abuse medication. She does not take more than prescribed. She denies any side effect from medications such as weight loss, hypertension, chest pain, heart palpitations or anxiety. 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 She is doing well on current mediation regimen.     Review of Systems   Constitutional: Negative for chills and fever.   HENT: Negative for congestion.    Eyes: Negative for blurred vision.   Respiratory: Negative for cough.    Cardiovascular: Negative for chest pain.   Gastrointestinal: Negative for abdominal pain, constipation, diarrhea, heartburn, nausea and vomiting.   Genitourinary: Negative for dysuria.   Musculoskeletal: Negative for myalgias.   Skin: Negative for itching and rash.   Neurological: Negative for dizziness and headaches.   Psychiatric/Behavioral: Negative for depression.       Active Problem List  Patient Active Problem List   Diagnosis    ADHD (attention deficit hyperactivity disorder)    GERD (gastroesophageal reflux disease)       Past Surgical History  Past Surgical History:   Procedure Laterality Date    GASTRIC BYPASS      LEEP      SMALL INTESTINE SURGERY      intestine were twisted    TONSILLECTOMY      uterine embolization  2018       Family History  Family History   Problem Relation Age of Onset    Diabetes Mother     Heart disease Mother     Hypertension Mother     Kidney disease Mother     Miscarriages / Stillbirths Mother     Vision loss Mother     Hypertension Father     Alcohol abuse Father         prior, not current    Alcohol abuse Brother      Drug abuse Brother     Learning disabilities Brother         ADD/ADHD, dyslexia    Alcohol abuse Paternal Uncle     Cancer Paternal Uncle     Depression Paternal Uncle     Early death Paternal Uncle     Diabetes Maternal Aunt     Miscarriages / Stillbirths Maternal Aunt     Diabetes Maternal Uncle     Kidney disease Maternal Uncle        Social History  Social History     Socioeconomic History    Marital status:      Spouse name: Not on file    Number of children: Not on file    Years of education: Not on file    Highest education level: Not on file   Social Needs    Financial resource strain: Not on file    Food insecurity - worry: Not on file    Food insecurity - inability: Not on file    Transportation needs - medical: Not on file    Transportation needs - non-medical: Not on file   Occupational History     Employer: [employer]   Tobacco Use    Smoking status: Never Smoker    Smokeless tobacco: Never Used   Substance and Sexual Activity    Alcohol use: Yes    Drug use: No    Sexual activity: Yes     Partners: Male   Other Topics Concern    Not on file   Social History Narrative    Not on file       Medications and Allergies  Reviewed and updated.   Current Outpatient Medications   Medication Sig    [START ON 4/8/2019] dextroamphetamine-amphetamine (ADDERALL XR) 20 MG 24 hr capsule Take 1 capsule (20 mg total) by mouth every morning.    [START ON 5/7/2019] dextroamphetamine-amphetamine (ADDERALL XR) 20 MG 24 hr capsule Take 1 capsule (20 mg total) by mouth every morning.    [START ON 6/5/2019] dextroamphetamine-amphetamine (ADDERALL XR) 20 MG 24 hr capsule Take 1 capsule (20 mg total) by mouth every morning.    esomeprazole (NEXIUM) 20 MG capsule TAKE 1 CAPSULE(20 MG) BY MOUTH BEFORE BREAKFAST    ibuprofen (ADVIL,MOTRIN) 800 MG tablet Take 1 tablet (800 mg total) by mouth 3 (three) times daily.    pantoprazole (PROTONIX) 40 MG tablet Take 1 tablet (40 mg  "total) by mouth once daily.    LORazepam (ATIVAN) 0.5 MG tablet Take 1 tablet (0.5 mg total) by mouth daily as needed for Anxiety.    meclizine (ANTIVERT) 12.5 mg tablet      No current facility-administered medications for this visit.        Physical Exam  /70 (BP Location: Right arm, Patient Position: Sitting, BP Method: Medium (Manual))   Pulse 86   Temp 98.3 °F (36.8 °C) (Oral)   Ht 5' 4" (1.626 m)   Wt 76.7 kg (169 lb 1.5 oz)   LMP 03/13/2019   SpO2 95%   BMI 29.02 kg/m²   Physical Exam   Constitutional: She is oriented to person, place, and time. She appears well-developed and well-nourished.   HENT:   Head: Normocephalic and atraumatic.   Eyes: Conjunctivae and EOM are normal. Pupils are equal, round, and reactive to light.   Neck: Normal range of motion. Neck supple.   Cardiovascular: Normal rate, regular rhythm and normal heart sounds. Exam reveals no gallop and no friction rub.   No murmur heard.  Pulmonary/Chest: Breath sounds normal. No respiratory distress.   Abdominal: Soft. Bowel sounds are normal. She exhibits no distension. There is no tenderness.   Musculoskeletal: Normal range of motion.   Lymphadenopathy:     She has no cervical adenopathy.   Neurological: She is alert and oriented to person, place, and time.   Skin: Skin is warm.   Psychiatric: She has a normal mood and affect.         Assessment/Plan:  Fadumo Barnhart is a 43 y.o. female who presents today for :    Problem List Items Addressed This Visit        Psychiatric    ADHD (attention deficit hyperactivity disorder) - Primary    Relevant Medications    dextroamphetamine-amphetamine (ADDERALL XR) 20 MG 24 hr capsule (Start on 4/8/2019)    dextroamphetamine-amphetamine (ADDERALL XR) 20 MG 24 hr capsule (Start on 5/7/2019)    dextroamphetamine-amphetamine (ADDERALL XR) 20 MG 24 hr capsule (Start on 6/5/2019)  Reviewed [place]   The current medical regimen is effective;  continue present plan and medications.        Other " Visit Diagnoses     General medical exam        Relevant Orders    CBC auto differential    Comprehensive metabolic panel    Lipid panel    Hemoglobin A1c    TSH    Encounter for screening mammogram for breast cancer        Relevant Orders    Mammo Digital Screening Bilateral With CAD    Need for prophylactic vaccination and inoculation against influenza        Relevant Orders    Influenza - Quadrivalent (3 years & older) (PF) (Completed)            Follow-up for yearly exam.

## 2019-03-21 ENCOUNTER — HOSPITAL ENCOUNTER (OUTPATIENT)
Dept: RADIOLOGY | Facility: HOSPITAL | Age: 44
Discharge: HOME | End: 2019-03-21
Attending: FAMILY MEDICINE
Payer: COMMERCIAL

## 2019-03-21 DIAGNOSIS — Z12.31 ENCOUNTER FOR SCREENING MAMMOGRAM FOR BREAST CANCER: ICD-10-CM

## 2019-03-21 PROCEDURE — 77063 BREAST TOMOSYNTHESIS BI: CPT | Mod: 26,,, | Performed by: RADIOLOGY

## 2019-03-21 PROCEDURE — 77067 MAMMO DIGITAL SCREENING BILAT WITH TOMOSYNTHESIS_CAD: ICD-10-PCS | Mod: 26,,, | Performed by: RADIOLOGY

## 2019-03-21 PROCEDURE — 77067 SCR MAMMO BI INCL CAD: CPT | Mod: TC,PO

## 2019-03-21 PROCEDURE — 77067 SCR MAMMO BI INCL CAD: CPT | Mod: 26,,, | Performed by: RADIOLOGY

## 2019-03-21 PROCEDURE — 77063 MAMMO DIGITAL SCREENING BILAT WITH TOMOSYNTHESIS_CAD: ICD-10-PCS | Mod: 26,,, | Performed by: RADIOLOGY

## 2019-04-08 ENCOUNTER — PATIENT OUTREACH (OUTPATIENT)
Dept: ADMINISTRATIVE | Facility: HOSPITAL | Age: 44
End: 2019-04-08

## 2019-04-22 ENCOUNTER — LAB VISIT (OUTPATIENT)
Dept: LAB | Facility: HOSPITAL | Age: 44
End: 2019-04-22
Attending: FAMILY MEDICINE
Payer: COMMERCIAL

## 2019-04-22 DIAGNOSIS — Z00.00 GENERAL MEDICAL EXAM: ICD-10-CM

## 2019-04-22 LAB
ALBUMIN SERPL BCP-MCNC: 3.4 G/DL (ref 3.5–5.2)
ALP SERPL-CCNC: 91 U/L (ref 55–135)
ALT SERPL W/O P-5'-P-CCNC: 15 U/L (ref 10–44)
ANION GAP SERPL CALC-SCNC: 7 MMOL/L (ref 8–16)
AST SERPL-CCNC: 18 U/L (ref 10–40)
BASOPHILS # BLD AUTO: 0.01 K/UL (ref 0–0.2)
BASOPHILS NFR BLD: 0.2 % (ref 0–1.9)
BILIRUB SERPL-MCNC: 0.7 MG/DL (ref 0.1–1)
BUN SERPL-MCNC: 9 MG/DL (ref 6–20)
CALCIUM SERPL-MCNC: 9.3 MG/DL (ref 8.7–10.5)
CHLORIDE SERPL-SCNC: 102 MMOL/L (ref 95–110)
CHOLEST SERPL-MCNC: 135 MG/DL (ref 120–199)
CHOLEST/HDLC SERPL: 2.2 {RATIO} (ref 2–5)
CO2 SERPL-SCNC: 27 MMOL/L (ref 23–29)
CREAT SERPL-MCNC: 0.8 MG/DL (ref 0.5–1.4)
DIFFERENTIAL METHOD: ABNORMAL
EOSINOPHIL # BLD AUTO: 0.1 K/UL (ref 0–0.5)
EOSINOPHIL NFR BLD: 1.1 % (ref 0–8)
ERYTHROCYTE [DISTWIDTH] IN BLOOD BY AUTOMATED COUNT: 14.2 % (ref 11.5–14.5)
EST. GFR  (AFRICAN AMERICAN): >60 ML/MIN/1.73 M^2
EST. GFR  (NON AFRICAN AMERICAN): >60 ML/MIN/1.73 M^2
ESTIMATED AVG GLUCOSE: 108 MG/DL (ref 68–131)
GLUCOSE SERPL-MCNC: 88 MG/DL (ref 70–110)
HBA1C MFR BLD HPLC: 5.4 % (ref 4–5.6)
HCT VFR BLD AUTO: 35 % (ref 37–48.5)
HDLC SERPL-MCNC: 62 MG/DL (ref 40–75)
HDLC SERPL: 45.9 % (ref 20–50)
HGB BLD-MCNC: 11 G/DL (ref 12–16)
IMM GRANULOCYTES # BLD AUTO: 0.03 K/UL (ref 0–0.04)
IMM GRANULOCYTES NFR BLD AUTO: 0.5 % (ref 0–0.5)
LDLC SERPL CALC-MCNC: 64.4 MG/DL (ref 63–159)
LYMPHOCYTES # BLD AUTO: 1.1 K/UL (ref 1–4.8)
LYMPHOCYTES NFR BLD: 17.5 % (ref 18–48)
MCH RBC QN AUTO: 27.6 PG (ref 27–31)
MCHC RBC AUTO-ENTMCNC: 31.4 G/DL (ref 32–36)
MCV RBC AUTO: 88 FL (ref 82–98)
MONOCYTES # BLD AUTO: 0.8 K/UL (ref 0.3–1)
MONOCYTES NFR BLD: 12.8 % (ref 4–15)
NEUTROPHILS # BLD AUTO: 4.2 K/UL (ref 1.8–7.7)
NEUTROPHILS NFR BLD: 67.9 % (ref 38–73)
NONHDLC SERPL-MCNC: 73 MG/DL
NRBC BLD-RTO: 0 /100 WBC
PLATELET # BLD AUTO: 273 K/UL (ref 150–350)
PMV BLD AUTO: 10.8 FL (ref 9.2–12.9)
POTASSIUM SERPL-SCNC: 4.1 MMOL/L (ref 3.5–5.1)
PROT SERPL-MCNC: 7.1 G/DL (ref 6–8.4)
RBC # BLD AUTO: 3.99 M/UL (ref 4–5.4)
SODIUM SERPL-SCNC: 136 MMOL/L (ref 136–145)
TRIGL SERPL-MCNC: 43 MG/DL (ref 30–150)
TSH SERPL DL<=0.005 MIU/L-ACNC: 0.52 UIU/ML (ref 0.4–4)
WBC # BLD AUTO: 6.16 K/UL (ref 3.9–12.7)

## 2019-04-22 PROCEDURE — 80061 LIPID PANEL: CPT

## 2019-04-22 PROCEDURE — 80053 COMPREHEN METABOLIC PANEL: CPT

## 2019-04-22 PROCEDURE — 36415 COLL VENOUS BLD VENIPUNCTURE: CPT | Mod: PO

## 2019-04-22 PROCEDURE — 83036 HEMOGLOBIN GLYCOSYLATED A1C: CPT

## 2019-04-22 PROCEDURE — 84443 ASSAY THYROID STIM HORMONE: CPT

## 2019-04-22 PROCEDURE — 85025 COMPLETE CBC W/AUTO DIFF WBC: CPT

## 2019-04-23 ENCOUNTER — OFFICE VISIT (OUTPATIENT)
Dept: FAMILY MEDICINE | Facility: CLINIC | Age: 44
End: 2019-04-23
Payer: COMMERCIAL

## 2019-04-23 VITALS
SYSTOLIC BLOOD PRESSURE: 128 MMHG | WEIGHT: 168.44 LBS | OXYGEN SATURATION: 98 % | DIASTOLIC BLOOD PRESSURE: 72 MMHG | TEMPERATURE: 99 F | HEIGHT: 64 IN | HEART RATE: 82 BPM | BODY MASS INDEX: 28.76 KG/M2

## 2019-04-23 DIAGNOSIS — K21.9 GASTROESOPHAGEAL REFLUX DISEASE WITHOUT ESOPHAGITIS: ICD-10-CM

## 2019-04-23 DIAGNOSIS — F90.9 ATTENTION DEFICIT HYPERACTIVITY DISORDER (ADHD), UNSPECIFIED ADHD TYPE: ICD-10-CM

## 2019-04-23 DIAGNOSIS — Z00.00 ANNUAL PHYSICAL EXAM: Primary | ICD-10-CM

## 2019-04-23 PROCEDURE — 99396 PR PREVENTIVE VISIT,EST,40-64: ICD-10-PCS | Mod: S$GLB,,, | Performed by: FAMILY MEDICINE

## 2019-04-23 PROCEDURE — 99999 PR PBB SHADOW E&M-EST. PATIENT-LVL III: CPT | Mod: PBBFAC,,, | Performed by: FAMILY MEDICINE

## 2019-04-23 PROCEDURE — 99396 PREV VISIT EST AGE 40-64: CPT | Mod: S$GLB,,, | Performed by: FAMILY MEDICINE

## 2019-04-23 PROCEDURE — 99999 PR PBB SHADOW E&M-EST. PATIENT-LVL III: ICD-10-PCS | Mod: PBBFAC,,, | Performed by: FAMILY MEDICINE

## 2019-04-23 RX ORDER — DEXTROAMPHETAMINE SACCHARATE, AMPHETAMINE ASPARTATE MONOHYDRATE, DEXTROAMPHETAMINE SULFATE AND AMPHETAMINE SULFATE 5; 5; 5; 5 MG/1; MG/1; MG/1; MG/1
20 CAPSULE, EXTENDED RELEASE ORAL EVERY MORNING
Qty: 30 CAPSULE | Refills: 0 | Status: SHIPPED | OUTPATIENT
Start: 2019-06-09 | End: 2019-07-26 | Stop reason: SDUPTHER

## 2019-04-23 RX ORDER — DEXTROAMPHETAMINE SACCHARATE, AMPHETAMINE ASPARTATE MONOHYDRATE, DEXTROAMPHETAMINE SULFATE AND AMPHETAMINE SULFATE 5; 5; 5; 5 MG/1; MG/1; MG/1; MG/1
20 CAPSULE, EXTENDED RELEASE ORAL EVERY MORNING
Qty: 30 CAPSULE | Refills: 0 | Status: SHIPPED | OUTPATIENT
Start: 2019-05-12 | End: 2019-06-10

## 2019-04-23 RX ORDER — DEXTROAMPHETAMINE SACCHARATE, AMPHETAMINE ASPARTATE MONOHYDRATE, DEXTROAMPHETAMINE SULFATE AND AMPHETAMINE SULFATE 5; 5; 5; 5 MG/1; MG/1; MG/1; MG/1
20 CAPSULE, EXTENDED RELEASE ORAL EVERY MORNING
Qty: 30 CAPSULE | Refills: 0 | Status: SHIPPED | OUTPATIENT
Start: 2019-06-05 | End: 2019-07-04

## 2019-04-23 NOTE — PROGRESS NOTES
Office Visit for Annual Exam    Patient Name: Fadumo Barnhart    : 1975  MRN: [id number]    Subjective:  Fadumo is a 43 y.o. female who presents today for     1. Annual physical  2. ADHD - needs prescription refill; She states when she does not take medication, she feels foggy. 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 She does not abuse medication. She does not take more than prescribed. She denies any side effect from medications such as weight loss, hypertension, chest pain, heart palpitations or anxiety. 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 She is doing well on current mediation regimen.        Review of Systems   Constitutional: Negative for chills and fever.   HENT: Negative for congestion.    Eyes: Negative for blurred vision.   Respiratory: Negative for cough.    Cardiovascular: Negative for chest pain.   Gastrointestinal: Negative for abdominal pain, constipation, diarrhea, heartburn, nausea and vomiting.   Genitourinary: Negative for dysuria.   Musculoskeletal: Negative for myalgias.   Skin: Negative for itching and rash.   Neurological: Negative for dizziness and headaches.   Psychiatric/Behavioral: Negative for depression.       Active Problem List  Patient Active Problem List   Diagnosis    ADHD (attention deficit hyperactivity disorder)    GERD (gastroesophageal reflux disease)       Past Surgical History  Past Surgical History:   Procedure Laterality Date    GASTRIC BYPASS      LEEP      SMALL INTESTINE SURGERY      intestine were twisted    TONSILLECTOMY      uterine embolization  2018       Family History  Family History   Problem Relation Age of Onset    Diabetes Mother     Heart disease Mother     Hypertension Mother     Kidney disease Mother     Miscarriages / Stillbirths Mother     Vision loss Mother     Hypertension Father     Alcohol abuse Father         prior,  not current    Alcohol abuse Brother     Drug abuse Brother     Learning disabilities Brother         ADD/ADHD, dyslexia    Alcohol abuse Paternal Uncle     Cancer Paternal Uncle     Depression Paternal Uncle     Early death Paternal Uncle     Diabetes Maternal Aunt     Miscarriages / Stillbirths Maternal Aunt     Diabetes Maternal Uncle     Kidney disease Maternal Uncle        Social History  Social History     Socioeconomic History    Marital status:      Spouse name: Not on file    Number of children: Not on file    Years of education: Not on file    Highest education level: Not on file   Occupational History     Employer: [employer]   Social Needs    Financial resource strain: Not on file    Food insecurity:     Worry: Not on file     Inability: Not on file    Transportation needs:     Medical: Not on file     Non-medical: Not on file   Tobacco Use    Smoking status: Never Smoker    Smokeless tobacco: Never Used   Substance and Sexual Activity    Alcohol use: Yes    Drug use: No    Sexual activity: Yes     Partners: Male   Lifestyle    Physical activity:     Days per week: Not on file     Minutes per session: Not on file    Stress: Not on file   Relationships    Social connections:     Talks on phone: Not on file     Gets together: Not on file     Attends Protestant service: Not on file     Active member of club or organization: Not on file     Attends meetings of clubs or organizations: Not on file     Relationship status: Not on file   Other Topics Concern    Not on file   Social History Narrative    Not on file       Medications and Allergies  Reviewed and updated.   Current Outpatient Medications   Medication Sig    [START ON 5/12/2019] dextroamphetamine-amphetamine (ADDERALL XR) 20 MG 24 hr capsule Take 1 capsule (20 mg total) by mouth every morning.    [START ON 6/9/2019] dextroamphetamine-amphetamine (ADDERALL XR) 20 MG 24 hr capsule Take 1 capsule (20 mg  "total) by mouth every morning.    [START ON 6/5/2019] dextroamphetamine-amphetamine (ADDERALL XR) 20 MG 24 hr capsule Take 1 capsule (20 mg total) by mouth every morning.    esomeprazole (NEXIUM) 20 MG capsule TAKE 1 CAPSULE(20 MG) BY MOUTH BEFORE BREAKFAST    ibuprofen (ADVIL,MOTRIN) 800 MG tablet Take 1 tablet (800 mg total) by mouth 3 (three) times daily.    meclizine (ANTIVERT) 12.5 mg tablet     pantoprazole (PROTONIX) 40 MG tablet Take 1 tablet (40 mg total) by mouth once daily.    LORazepam (ATIVAN) 0.5 MG tablet Take 1 tablet (0.5 mg total) by mouth daily as needed for Anxiety.     No current facility-administered medications for this visit.        Physical Exam  /72 (BP Location: Left arm, Patient Position: Sitting)   Pulse 82   Temp 98.7 °F (37.1 °C) (Oral)   Ht 5' 4" (1.626 m)   Wt 76.4 kg (168 lb 6.9 oz)   LMP 03/23/2019   SpO2 98%   BMI 28.91 kg/m²   Physical Exam   Constitutional: She is oriented to person, place, and time. She appears well-developed and well-nourished.   HENT:   Head: Normocephalic and atraumatic.   Eyes: Pupils are equal, round, and reactive to light. Conjunctivae and EOM are normal.   Neck: Normal range of motion. Neck supple.   Cardiovascular: Normal rate, regular rhythm and normal heart sounds. Exam reveals no gallop and no friction rub.   No murmur heard.  Pulmonary/Chest: Breath sounds normal. No respiratory distress.   Abdominal: Soft. Bowel sounds are normal. She exhibits no distension. There is no tenderness.   Musculoskeletal: Normal range of motion.   Lymphadenopathy:     She has no cervical adenopathy.   Neurological: She is alert and oriented to person, place, and time.   Skin: Skin is warm.   Psychiatric: She has a normal mood and affect.         Assessment/Plan:  Fadumo Barnhart is a 43 y.o. female who presents today for :    Annual physical exam  Health Maintenance       Date Due Completion Date    Pap Smear with HPV Cotest 03/27/2017 3/27/2014 " (Declined)    Override on 3/27/2014: Declined (was done last year by OB-GYN 12/2013)    Mammogram 03/21/2021 3/21/2019    TETANUS VACCINE 12/16/2023 12/16/2013        I addressed all major concerns as it related to health maintenance.  All were ordered and scheduled based on the patients wishes.  Any additional health maintenance will be readdressed at the next physical if declined or deferred by the patient.    Attention deficit hyperactivity disorder (ADHD), unspecified ADHD type  -     dextroamphetamine-amphetamine (ADDERALL XR) 20 MG 24 hr capsule; Take 1 capsule (20 mg total) by mouth every morning.  Dispense: 30 capsule; Refill: 0  -     dextroamphetamine-amphetamine (ADDERALL XR) 20 MG 24 hr capsule; Take 1 capsule (20 mg total) by mouth every morning.  Dispense: 30 capsule; Refill: 0  -     dextroamphetamine-amphetamine (ADDERALL XR) 20 MG 24 hr capsule; Take 1 capsule (20 mg total) by mouth every morning.  Dispense: 30 capsule; Refill: 0  Reviewed [place]   The current medical regimen is effective;  continue present plan and medications.  Common side effects of this medication were discussed with the patient. Questions regarding medications were discussed during this visit.     Gastroesophageal reflux disease without esophagitis  The current medical regimen is effective;  continue present plan and medications.        Follow up in about 3 months (around 7/23/2019), or if symptoms worsen or fail to improve.

## 2019-07-08 ENCOUNTER — PATIENT OUTREACH (OUTPATIENT)
Dept: ADMINISTRATIVE | Facility: HOSPITAL | Age: 44
End: 2019-07-08

## 2019-07-26 ENCOUNTER — OFFICE VISIT (OUTPATIENT)
Dept: FAMILY MEDICINE | Facility: CLINIC | Age: 44
End: 2019-07-26
Payer: COMMERCIAL

## 2019-07-26 VITALS
DIASTOLIC BLOOD PRESSURE: 73 MMHG | SYSTOLIC BLOOD PRESSURE: 124 MMHG | OXYGEN SATURATION: 98 % | HEIGHT: 64 IN | WEIGHT: 165.13 LBS | BODY MASS INDEX: 28.19 KG/M2 | HEART RATE: 76 BPM | TEMPERATURE: 98 F

## 2019-07-26 DIAGNOSIS — F90.9 ATTENTION DEFICIT HYPERACTIVITY DISORDER (ADHD), UNSPECIFIED ADHD TYPE: ICD-10-CM

## 2019-07-26 DIAGNOSIS — K21.9 GASTROESOPHAGEAL REFLUX DISEASE WITHOUT ESOPHAGITIS: Primary | ICD-10-CM

## 2019-07-26 PROCEDURE — 99999 PR PBB SHADOW E&M-EST. PATIENT-LVL III: ICD-10-PCS | Mod: PBBFAC,,, | Performed by: FAMILY MEDICINE

## 2019-07-26 PROCEDURE — 99214 OFFICE O/P EST MOD 30 MIN: CPT | Mod: S$GLB,,, | Performed by: FAMILY MEDICINE

## 2019-07-26 PROCEDURE — 99214 PR OFFICE/OUTPT VISIT, EST, LEVL IV, 30-39 MIN: ICD-10-PCS | Mod: S$GLB,,, | Performed by: FAMILY MEDICINE

## 2019-07-26 PROCEDURE — 99999 PR PBB SHADOW E&M-EST. PATIENT-LVL III: CPT | Mod: PBBFAC,,, | Performed by: FAMILY MEDICINE

## 2019-07-26 RX ORDER — DEXTROAMPHETAMINE SACCHARATE, AMPHETAMINE ASPARTATE MONOHYDRATE, DEXTROAMPHETAMINE SULFATE AND AMPHETAMINE SULFATE 5; 5; 5; 5 MG/1; MG/1; MG/1; MG/1
20 CAPSULE, EXTENDED RELEASE ORAL EVERY MORNING
Qty: 30 CAPSULE | Refills: 0 | Status: SHIPPED | OUTPATIENT
Start: 2019-08-23 | End: 2019-09-21

## 2019-07-26 RX ORDER — DEXTROAMPHETAMINE SACCHARATE, AMPHETAMINE ASPARTATE MONOHYDRATE, DEXTROAMPHETAMINE SULFATE AND AMPHETAMINE SULFATE 5; 5; 5; 5 MG/1; MG/1; MG/1; MG/1
20 CAPSULE, EXTENDED RELEASE ORAL EVERY MORNING
Qty: 30 CAPSULE | Refills: 0 | Status: SHIPPED | OUTPATIENT
Start: 2019-09-20 | End: 2019-10-11 | Stop reason: SDUPTHER

## 2019-07-26 RX ORDER — DEXTROAMPHETAMINE SACCHARATE, AMPHETAMINE ASPARTATE MONOHYDRATE, DEXTROAMPHETAMINE SULFATE AND AMPHETAMINE SULFATE 5; 5; 5; 5 MG/1; MG/1; MG/1; MG/1
20 CAPSULE, EXTENDED RELEASE ORAL EVERY MORNING
Qty: 30 CAPSULE | Refills: 0 | Status: SHIPPED | OUTPATIENT
Start: 2019-07-26 | End: 2019-08-24

## 2019-07-26 NOTE — PROGRESS NOTES
Routine Office Visit    Patient Name: Fadumo Barnhart    : 1975  MRN: [id number]    Subjective:  Fadumo is a 44 y.o. female who presents today for     1. ADHD - needs prescription refill; She states when she does not take medication, she feels foggy. 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 She does not abuse medication. She does not take more than prescribed. She denies any side effect from medications such as weight loss, hypertension, chest pain, heart palpitations or anxiety. 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 She is doing well on current mediation regimen. she recently started intermittent fasting and has been eating more protein and less carbohydrates.     Review of Systems   Constitutional: Negative for chills and fever.   HENT: Negative for congestion.    Eyes: Negative for blurred vision.   Respiratory: Negative for cough.    Cardiovascular: Negative for chest pain.   Gastrointestinal: Negative for abdominal pain, constipation, diarrhea, heartburn, nausea and vomiting.   Genitourinary: Negative for dysuria.   Musculoskeletal: Negative for myalgias.   Skin: Negative for itching and rash.   Neurological: Negative for dizziness and headaches.   Psychiatric/Behavioral: Negative for depression.       Active Problem List  Patient Active Problem List   Diagnosis    ADHD (attention deficit hyperactivity disorder)    GERD (gastroesophageal reflux disease)       Past Surgical History  Past Surgical History:   Procedure Laterality Date    GASTRIC BYPASS      LEEP      SMALL INTESTINE SURGERY      intestine were twisted    TONSILLECTOMY      uterine embolization  2018       Family History  Family History   Problem Relation Age of Onset    Diabetes Mother     Heart disease Mother     Hypertension Mother      Kidney disease Mother     Miscarriages / Stillbirths Mother     Vision loss Mother     Hypertension Father     Alcohol abuse Father         prior, not current    Alcohol abuse Brother     Drug abuse Brother     Learning disabilities Brother         ADD/ADHD, dyslexia    Alcohol abuse Paternal Uncle     Cancer Paternal Uncle     Depression Paternal Uncle     Early death Paternal Uncle     Diabetes Maternal Aunt     Miscarriages / Stillbirths Maternal Aunt     Diabetes Maternal Uncle     Kidney disease Maternal Uncle        Social History  Social History     Socioeconomic History    Marital status:      Spouse name: Not on file    Number of children: Not on file    Years of education: Not on file    Highest education level: Not on file   Occupational History     Employer: [employer]   Social Needs    Financial resource strain: Not on file    Food insecurity:     Worry: Not on file     Inability: Not on file    Transportation needs:     Medical: Not on file     Non-medical: Not on file   Tobacco Use    Smoking status: Never Smoker    Smokeless tobacco: Never Used   Substance and Sexual Activity    Alcohol use: Yes    Drug use: No    Sexual activity: Yes     Partners: Male   Lifestyle    Physical activity:     Days per week: Not on file     Minutes per session: Not on file    Stress: Not on file   Relationships    Social connections:     Talks on phone: Not on file     Gets together: Not on file     Attends Confucianist service: Not on file     Active member of club or organization: Not on file     Attends meetings of clubs or organizations: Not on file     Relationship status: Not on file   Other Topics Concern    Not on file   Social History Narrative    Not on file       Medications and Allergies  Reviewed and updated.   Current Outpatient Medications   Medication Sig    dextroamphetamine-amphetamine (ADDERALL XR) 20 MG 24 hr capsule Take 1 capsule (20 mg total) by  "mouth every morning.    [START ON 8/23/2019] dextroamphetamine-amphetamine (ADDERALL XR) 20 MG 24 hr capsule Take 1 capsule (20 mg total) by mouth every morning.    [START ON 9/20/2019] dextroamphetamine-amphetamine (ADDERALL XR) 20 MG 24 hr capsule Take 1 capsule (20 mg total) by mouth every morning.    ibuprofen (ADVIL,MOTRIN) 800 MG tablet Take 1 tablet (800 mg total) by mouth 3 (three) times daily.    pantoprazole (PROTONIX) 40 MG tablet Take 1 tablet (40 mg total) by mouth once daily.     No current facility-administered medications for this visit.        Physical Exam  /73 (BP Location: Right arm, Patient Position: Sitting, BP Method: Medium (Manual))   Pulse 76   Temp 98.1 °F (36.7 °C) (Oral)   Ht 5' 4" (1.626 m)   Wt 74.9 kg (165 lb 2 oz)   LMP 06/15/2019 (Approximate)   SpO2 98%   BMI 28.34 kg/m²   Physical Exam   Constitutional: She is oriented to person, place, and time. She appears well-developed and well-nourished.   HENT:   Head: Normocephalic and atraumatic.   Eyes: Pupils are equal, round, and reactive to light. Conjunctivae and EOM are normal.   Neck: Normal range of motion. Neck supple.   Cardiovascular: Normal rate, regular rhythm and normal heart sounds. Exam reveals no gallop and no friction rub.   No murmur heard.  Pulmonary/Chest: Breath sounds normal. No respiratory distress.   Abdominal: Soft. Bowel sounds are normal. She exhibits no distension. There is no tenderness.   Musculoskeletal: Normal range of motion.   Lymphadenopathy:     She has no cervical adenopathy.   Neurological: She is alert and oriented to person, place, and time.   Skin: Skin is warm.   Psychiatric: She has a normal mood and affect.         Assessment/Plan:  Fadumo Barnhart is a 44 y.o. female who presents today for :    Problem List Items Addressed This Visit        Psychiatric    ADHD (attention deficit hyperactivity disorder)    Relevant Medications    dextroamphetamine-amphetamine (ADDERALL XR) 20 " MG 24 hr capsule    dextroamphetamine-amphetamine (ADDERALL XR) 20 MG 24 hr capsule (Start on 8/23/2019)    dextroamphetamine-amphetamine (ADDERALL XR) 20 MG 24 hr capsule (Start on 9/20/2019)  Reviewed [place]   The current medical regimen is effective;  continue present plan and medications.  Common side effects of this medication were discussed with the patient. Questions regarding medications were discussed during this visit.        GI    GERD (gastroesophageal reflux disease) - Primary  The current medical regimen is effective;  continue present plan and medications.              Follow up in about 3 months (around 10/26/2019), or if symptoms worsen or fail to improve.

## 2019-08-14 DIAGNOSIS — K21.9 GASTROESOPHAGEAL REFLUX DISEASE WITHOUT ESOPHAGITIS: Primary | ICD-10-CM

## 2019-08-14 RX ORDER — PANTOPRAZOLE SODIUM 40 MG/1
40 TABLET, DELAYED RELEASE ORAL DAILY
Qty: 30 TABLET | Refills: 11 | Status: SHIPPED | OUTPATIENT
Start: 2019-08-14 | End: 2019-10-11 | Stop reason: SDUPTHER

## 2019-08-14 NOTE — TELEPHONE ENCOUNTER
----- Message from Isadora Sanchez sent at 2019 11:46 AM CDT -----  Contact: Patient  Type: RX Refill Request    Who Called: Patient    Refill or New Rx: Refill     RX Name and Strength: pantoprazole (PROTONIX) 40 MG tablet ()    How is the patient currently taking it? (ex. 1XDay):    Is this a 30 day or 90 day RX: 30    Preferred Pharmacy with phone number: [id number] - [place] - [address] [facility] [phone] (Phone)  [phone] (Fax)        Local or Mail Order: Local    Ordering Provider: Dr. Chi    Would the patient rather a call back or a response via My [facility]? Call back     Best Call Back Number: [phone]    Additional Information:

## 2019-09-03 ENCOUNTER — TELEPHONE (OUTPATIENT)
Dept: FAMILY MEDICINE | Facility: CLINIC | Age: 44
End: 2019-09-03

## 2019-09-03 NOTE — TELEPHONE ENCOUNTER
----- Message from Keli Stoddard sent at 9/3/2019  8:02 AM CDT -----  Contact: Self/  [phone]  Type: Patient Call Back    Who called:  Patient    What is the request in detail:  Patient would like staff to give her a call, she's wanting to know her last refills on her medication.  Thank you    Would the patient rather a call back or a response via [facility]?  Call back    Best call back number:  [phone]

## 2019-09-03 NOTE — TELEPHONE ENCOUNTER
Patient said that she never got any feed back on her refill request for her Protonix.  I informed her that Dr. Chi re-filled the Rx. on 08/14/19 at [facility] [address] [facility].  She verbalized understanding.  Patient also asked when her next Rx. for the Adderall XR is due .  I informed her that she should have Rx.'s to last until her scheduled appointment on 10/11/19.  She verbalized understanding and intent to F/U.

## 2019-10-11 ENCOUNTER — OFFICE VISIT (OUTPATIENT)
Dept: FAMILY MEDICINE | Facility: CLINIC | Age: 44
End: 2019-10-11
Payer: COMMERCIAL

## 2019-10-11 VITALS
BODY MASS INDEX: 28.79 KG/M2 | SYSTOLIC BLOOD PRESSURE: 121 MMHG | DIASTOLIC BLOOD PRESSURE: 70 MMHG | HEIGHT: 64 IN | OXYGEN SATURATION: 99 % | WEIGHT: 168.63 LBS | TEMPERATURE: 98 F | HEART RATE: 90 BPM

## 2019-10-11 DIAGNOSIS — F90.9 ATTENTION DEFICIT HYPERACTIVITY DISORDER (ADHD), UNSPECIFIED ADHD TYPE: ICD-10-CM

## 2019-10-11 DIAGNOSIS — R10.9 ABDOMINAL PAIN, UNSPECIFIED ABDOMINAL LOCATION: ICD-10-CM

## 2019-10-11 DIAGNOSIS — K21.9 GASTROESOPHAGEAL REFLUX DISEASE WITHOUT ESOPHAGITIS: ICD-10-CM

## 2019-10-11 PROCEDURE — 99999 PR PBB SHADOW E&M-EST. PATIENT-LVL III: CPT | Mod: PBBFAC,,, | Performed by: FAMILY MEDICINE

## 2019-10-11 PROCEDURE — 99214 PR OFFICE/OUTPT VISIT, EST, LEVL IV, 30-39 MIN: ICD-10-PCS | Mod: S$GLB,,, | Performed by: FAMILY MEDICINE

## 2019-10-11 PROCEDURE — 99214 OFFICE O/P EST MOD 30 MIN: CPT | Mod: S$GLB,,, | Performed by: FAMILY MEDICINE

## 2019-10-11 PROCEDURE — 99999 PR PBB SHADOW E&M-EST. PATIENT-LVL III: ICD-10-PCS | Mod: PBBFAC,,, | Performed by: FAMILY MEDICINE

## 2019-10-11 RX ORDER — DEXTROAMPHETAMINE SACCHARATE, AMPHETAMINE ASPARTATE MONOHYDRATE, DEXTROAMPHETAMINE SULFATE AND AMPHETAMINE SULFATE 5; 5; 5; 5 MG/1; MG/1; MG/1; MG/1
20 CAPSULE, EXTENDED RELEASE ORAL EVERY MORNING
Qty: 30 CAPSULE | Refills: 0 | Status: SHIPPED | OUTPATIENT
Start: 2019-10-11 | End: 2019-11-09

## 2019-10-11 RX ORDER — DEXTROAMPHETAMINE SACCHARATE, AMPHETAMINE ASPARTATE MONOHYDRATE, DEXTROAMPHETAMINE SULFATE AND AMPHETAMINE SULFATE 5; 5; 5; 5 MG/1; MG/1; MG/1; MG/1
20 CAPSULE, EXTENDED RELEASE ORAL EVERY MORNING
Qty: 30 CAPSULE | Refills: 0 | Status: SHIPPED | OUTPATIENT
Start: 2019-11-09 | End: 2019-12-08

## 2019-10-11 RX ORDER — DEXTROAMPHETAMINE SACCHARATE, AMPHETAMINE ASPARTATE MONOHYDRATE, DEXTROAMPHETAMINE SULFATE AND AMPHETAMINE SULFATE 5; 5; 5; 5 MG/1; MG/1; MG/1; MG/1
20 CAPSULE, EXTENDED RELEASE ORAL EVERY MORNING
Qty: 30 CAPSULE | Refills: 0 | Status: SHIPPED | OUTPATIENT
Start: 2019-12-08 | End: 2020-01-10 | Stop reason: SDUPTHER

## 2019-10-11 RX ORDER — PANTOPRAZOLE SODIUM 40 MG/1
40 TABLET, DELAYED RELEASE ORAL DAILY
Qty: 90 TABLET | Refills: 1 | Status: SHIPPED | OUTPATIENT
Start: 2019-10-11 | End: 2019-10-11 | Stop reason: SDUPTHER

## 2019-10-11 RX ORDER — IBUPROFEN 800 MG/1
800 TABLET ORAL 3 TIMES DAILY
Qty: 90 TABLET | Refills: 1 | Status: SHIPPED | OUTPATIENT
Start: 2019-10-11 | End: 2019-10-11 | Stop reason: SDUPTHER

## 2019-10-11 RX ORDER — PANTOPRAZOLE SODIUM 40 MG/1
40 TABLET, DELAYED RELEASE ORAL DAILY
Qty: 90 TABLET | Refills: 1 | Status: SHIPPED | OUTPATIENT
Start: 2019-10-11 | End: 2020-06-30

## 2019-10-11 RX ORDER — IBUPROFEN 800 MG/1
800 TABLET ORAL 3 TIMES DAILY
Qty: 90 TABLET | Refills: 1 | Status: SHIPPED | OUTPATIENT
Start: 2019-10-11 | End: 2021-02-01

## 2019-10-11 NOTE — PROGRESS NOTES
Routine Office Visit    Patient Name: Fadumo Barnhart    : 1975  MRN: [id number]    Subjective:  Fadumo is a 44 y.o. female who presents today for     1. 1. ADHD - needs prescription refill; She states when she does not take medication, she feels foggy. 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 She does not abuse medication. She does not take more than prescribed. She denies any side effect from medications such as weight loss, hypertension, chest pain, heart palpitations or anxiety. 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 She is doing well on current mediation regimen. she is currently monitoring her diet by wearing abdominal beads.     Review of Systems   Constitutional: Negative for chills and fever.   HENT: Negative for congestion.    Eyes: Negative for blurred vision.   Respiratory: Negative for cough.    Cardiovascular: Negative for chest pain.   Gastrointestinal: Negative for abdominal pain, constipation, diarrhea, heartburn, nausea and vomiting.   Genitourinary: Negative for dysuria.   Musculoskeletal: Negative for myalgias.   Skin: Negative for itching and rash.   Neurological: Negative for dizziness and headaches.   Psychiatric/Behavioral: Negative for depression.       Active Problem List  Patient Active Problem List   Diagnosis    ADHD (attention deficit hyperactivity disorder)    GERD (gastroesophageal reflux disease)       Past Surgical History  Past Surgical History:   Procedure Laterality Date    GASTRIC BYPASS      LEEP      SMALL INTESTINE SURGERY      intestine were twisted    TONSILLECTOMY      uterine embolization  2018       Family History  Family History   Problem Relation Age of Onset    Diabetes Mother     Heart disease Mother     Hypertension Mother     Kidney disease Mother      Miscarriages / Stillbirths Mother     Vision loss Mother     Hypertension Father     Alcohol abuse Father         prior, not current    Alcohol abuse Brother     Drug abuse Brother     Learning disabilities Brother         ADD/ADHD, dyslexia    Alcohol abuse Paternal Uncle     Cancer Paternal Uncle     Depression Paternal Uncle     Early death Paternal Uncle     Diabetes Maternal Aunt     Miscarriages / Stillbirths Maternal Aunt     Diabetes Maternal Uncle     Kidney disease Maternal Uncle        Social History  Social History     Socioeconomic History    Marital status:      Spouse name: Not on file    Number of children: Not on file    Years of education: Not on file    Highest education level: Not on file   Occupational History     Employer: [employer]   Social Needs    Financial resource strain: Not on file    Food insecurity:     Worry: Not on file     Inability: Not on file    Transportation needs:     Medical: Not on file     Non-medical: Not on file   Tobacco Use    Smoking status: Never Smoker    Smokeless tobacco: Never Used   Substance and Sexual Activity    Alcohol use: Yes    Drug use: No    Sexual activity: Yes     Partners: Male   Lifestyle    Physical activity:     Days per week: Not on file     Minutes per session: Not on file    Stress: Not on file   Relationships    Social connections:     Talks on phone: Not on file     Gets together: Not on file     Attends Sabianism service: Not on file     Active member of club or organization: Not on file     Attends meetings of clubs or organizations: Not on file     Relationship status: Not on file   Other Topics Concern    Not on file   Social History Narrative    Not on file       Medications and Allergies  Reviewed and updated.   Current Outpatient Medications   Medication Sig    dextroamphetamine-amphetamine (ADDERALL XR) 20 MG 24 hr capsule Take 1 capsule (20 mg total) by mouth every morning.     "[START ON 11/9/2019] dextroamphetamine-amphetamine (ADDERALL XR) 20 MG 24 hr capsule Take 1 capsule (20 mg total) by mouth every morning.    [START ON 12/8/2019] dextroamphetamine-amphetamine (ADDERALL XR) 20 MG 24 hr capsule Take 1 capsule (20 mg total) by mouth every morning.    ibuprofen (ADVIL,MOTRIN) 800 MG tablet Take 1 tablet (800 mg total) by mouth 3 (three) times daily.    pantoprazole (PROTONIX) 40 MG tablet Take 1 tablet (40 mg total) by mouth once daily.     No current facility-administered medications for this visit.        Physical Exam  /70 (BP Location: Left arm, Patient Position: Sitting, BP Method: Medium (Manual))   Pulse 90   Temp 98.3 °F (36.8 °C) (Oral)   Ht 5' 4" (1.626 m)   Wt 76.5 kg (168 lb 10.4 oz)   LMP 10/04/2019 (Approximate)   SpO2 99%   BMI 28.95 kg/m²   Physical Exam   Constitutional: She is oriented to person, place, and time. She appears well-developed and well-nourished.   HENT:   Head: Normocephalic and atraumatic.   Eyes: Pupils are equal, round, and reactive to light. Conjunctivae and EOM are normal.   Neck: Normal range of motion. Neck supple.   Cardiovascular: Normal rate, regular rhythm and normal heart sounds. Exam reveals no gallop and no friction rub.   No murmur heard.  Pulmonary/Chest: Breath sounds normal. No respiratory distress.   Abdominal: Soft. Bowel sounds are normal. She exhibits no distension. There is no tenderness.   Musculoskeletal: Normal range of motion.   Lymphadenopathy:     She has no cervical adenopathy.   Neurological: She is alert and oriented to person, place, and time.   Skin: Skin is warm.   Psychiatric: She has a normal mood and affect.         Assessment/Plan:  Fadumo Barnhart is a 44 y.o. female who presents today for :    Problem List Items Addressed This Visit        Psychiatric    ADHD (attention deficit hyperactivity disorder)    Relevant Medications    dextroamphetamine-amphetamine (ADDERALL XR) 20 MG 24 hr capsule    " dextroamphetamine-amphetamine (ADDERALL XR) 20 MG 24 hr capsule (Start on 11/9/2019)    dextroamphetamine-amphetamine (ADDERALL XR) 20 MG 24 hr capsule (Start on 12/8/2019)  Reviewed [place]          GI    GERD (gastroesophageal reflux disease)    Relevant Medications    pantoprazole (PROTONIX) 40 MG tablet  The current medical regimen is effective;  continue present plan and medications.        Other Visit Diagnoses     Abdominal pain, unspecified abdominal location        Relevant Medications    ibuprofen (ADVIL,MOTRIN) 800 MG tablet  The current medical regimen is effective;  continue present plan and medications.  Take with ppi               Follow up in about 3 months (around 1/11/2020), or if symptoms worsen or fail to improve.

## 2020-01-10 ENCOUNTER — OFFICE VISIT (OUTPATIENT)
Dept: FAMILY MEDICINE | Facility: CLINIC | Age: 45
End: 2020-01-10
Payer: COMMERCIAL

## 2020-01-10 VITALS
DIASTOLIC BLOOD PRESSURE: 76 MMHG | HEART RATE: 88 BPM | WEIGHT: 166.88 LBS | HEIGHT: 64 IN | SYSTOLIC BLOOD PRESSURE: 120 MMHG | TEMPERATURE: 98 F | BODY MASS INDEX: 28.49 KG/M2 | OXYGEN SATURATION: 97 %

## 2020-01-10 DIAGNOSIS — Z00.00 GENERAL MEDICAL EXAM: ICD-10-CM

## 2020-01-10 DIAGNOSIS — F90.9 ATTENTION DEFICIT HYPERACTIVITY DISORDER (ADHD), UNSPECIFIED ADHD TYPE: Primary | ICD-10-CM

## 2020-01-10 DIAGNOSIS — K21.9 GASTROESOPHAGEAL REFLUX DISEASE WITHOUT ESOPHAGITIS: ICD-10-CM

## 2020-01-10 PROCEDURE — 99999 PR PBB SHADOW E&M-EST. PATIENT-LVL III: ICD-10-PCS | Mod: PBBFAC,,, | Performed by: FAMILY MEDICINE

## 2020-01-10 PROCEDURE — 99214 OFFICE O/P EST MOD 30 MIN: CPT | Mod: S$GLB,,, | Performed by: FAMILY MEDICINE

## 2020-01-10 PROCEDURE — 99214 PR OFFICE/OUTPT VISIT, EST, LEVL IV, 30-39 MIN: ICD-10-PCS | Mod: S$GLB,,, | Performed by: FAMILY MEDICINE

## 2020-01-10 PROCEDURE — 99999 PR PBB SHADOW E&M-EST. PATIENT-LVL III: CPT | Mod: PBBFAC,,, | Performed by: FAMILY MEDICINE

## 2020-01-10 RX ORDER — DEXTROAMPHETAMINE SACCHARATE, AMPHETAMINE ASPARTATE MONOHYDRATE, DEXTROAMPHETAMINE SULFATE AND AMPHETAMINE SULFATE 5; 5; 5; 5 MG/1; MG/1; MG/1; MG/1
20 CAPSULE, EXTENDED RELEASE ORAL EVERY MORNING
Qty: 30 CAPSULE | Refills: 0 | Status: SHIPPED | OUTPATIENT
Start: 2020-01-31 | End: 2020-02-29

## 2020-01-10 RX ORDER — DEXTROAMPHETAMINE SACCHARATE, AMPHETAMINE ASPARTATE MONOHYDRATE, DEXTROAMPHETAMINE SULFATE AND AMPHETAMINE SULFATE 5; 5; 5; 5 MG/1; MG/1; MG/1; MG/1
20 CAPSULE, EXTENDED RELEASE ORAL EVERY MORNING
Qty: 30 CAPSULE | Refills: 0 | Status: SHIPPED | OUTPATIENT
Start: 2020-02-29 | End: 2020-03-29

## 2020-01-10 RX ORDER — DEXTROAMPHETAMINE SACCHARATE, AMPHETAMINE ASPARTATE MONOHYDRATE, DEXTROAMPHETAMINE SULFATE AND AMPHETAMINE SULFATE 5; 5; 5; 5 MG/1; MG/1; MG/1; MG/1
20 CAPSULE, EXTENDED RELEASE ORAL EVERY MORNING
Qty: 30 CAPSULE | Refills: 0 | Status: SHIPPED | OUTPATIENT
Start: 2020-03-29 | End: 2020-05-18 | Stop reason: SDUPTHER
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
## 2020-04-06 ENCOUNTER — PATIENT MESSAGE (OUTPATIENT)
Dept: FAMILY MEDICINE | Facility: CLINIC | Age: 45
End: 2020-04-06

## 2020-04-21 ENCOUNTER — PATIENT OUTREACH (OUTPATIENT)
Dept: ADMINISTRATIVE | Facility: HOSPITAL | Age: 45
End: 2020-04-21

## 2020-04-21 NOTE — LETTER
AUTHORIZATION FOR RELEASE OF   CONFIDENTIAL INFORMATION    Dear Dr. Marcell Brown Jr.,    We are seeing Fadumo Barnhart, date of birth 1975, in the clinic at [facility]/ INTERNAL MED/ PEDS. Suze Chi MD is the patient's PCP. Fadumo Barnhart has an outstanding lab/procedure at the time we reviewed her chart. In order to help keep her health information updated, she has authorized us to request the following medical record(s):        (  )  MAMMOGRAM                                      (  )  COLONOSCOPY      ( x )  PAP SMEAR & HPV 2016-current       (  )  OUTSIDE LAB RESULTS     (  )  DEXA SCAN                                          (  )  EYE EXAM            (  )  FOOT EXAM                                          (  )  ENTIRE RECORD     (  )  OUTSIDE IMMUNIZATIONS                 (  )  _______________         Please fax records to [facility], Dianna Phan, MD,  [phone].     If you have any questions, please contact Arlin Singer LPN [facility] at   [phone].       Patient Name: Fadumo Barnhart  : 1975  Patient Phone #: [phone]

## 2020-04-21 NOTE — PROGRESS NOTES
Overdue Pap Smear - A request was sent today for patient's record. No results in [facility] or [facility].     No HIV test done.

## 2020-05-12 ENCOUNTER — TELEPHONE (OUTPATIENT)
Dept: FAMILY MEDICINE | Facility: CLINIC | Age: 45
End: 2020-05-12

## 2020-05-12 NOTE — TELEPHONE ENCOUNTER
----- Message from Jazmin Blanchard sent at 5/12/2020 10:51 AM CDT -----  Contact: HOLLY QUEEN [[id number]]  Name of Who is Calling: HOLLY QUEEN [[id number]]      What is the request in detail: Pt is calling to speak to staff in regards to receiving a sooner maykel pt declined next jovita. 6/22... Please call to further assist .       Can the clinic reply by [facility]: N      What Number to Call Back if not in [facility]: [phone]

## 2020-05-18 ENCOUNTER — OFFICE VISIT (OUTPATIENT)
Dept: FAMILY MEDICINE | Facility: CLINIC | Age: 45
End: 2020-05-18
Payer: COMMERCIAL

## 2020-05-18 VITALS
TEMPERATURE: 98 F | OXYGEN SATURATION: 98 % | HEART RATE: 67 BPM | BODY MASS INDEX: 29.43 KG/M2 | SYSTOLIC BLOOD PRESSURE: 120 MMHG | WEIGHT: 172.38 LBS | HEIGHT: 64 IN | DIASTOLIC BLOOD PRESSURE: 80 MMHG

## 2020-05-18 DIAGNOSIS — K21.9 GASTROESOPHAGEAL REFLUX DISEASE WITHOUT ESOPHAGITIS: Primary | ICD-10-CM

## 2020-05-18 DIAGNOSIS — F90.9 ATTENTION DEFICIT HYPERACTIVITY DISORDER (ADHD), UNSPECIFIED ADHD TYPE: ICD-10-CM

## 2020-05-18 DIAGNOSIS — M25.511 RIGHT SHOULDER PAIN, UNSPECIFIED CHRONICITY: ICD-10-CM

## 2020-05-18 PROCEDURE — 99999 PR PBB SHADOW E&M-EST. PATIENT-LVL III: CPT | Mod: PBBFAC,,, | Performed by: FAMILY MEDICINE

## 2020-05-18 PROCEDURE — 99214 OFFICE O/P EST MOD 30 MIN: CPT | Mod: S$GLB,,, | Performed by: FAMILY MEDICINE

## 2020-05-18 PROCEDURE — 99999 PR PBB SHADOW E&M-EST. PATIENT-LVL III: ICD-10-PCS | Mod: PBBFAC,,, | Performed by: FAMILY MEDICINE

## 2020-05-18 PROCEDURE — 99214 PR OFFICE/OUTPT VISIT, EST, LEVL IV, 30-39 MIN: ICD-10-PCS | Mod: S$GLB,,, | Performed by: FAMILY MEDICINE

## 2020-05-18 RX ORDER — DEXTROAMPHETAMINE SACCHARATE, AMPHETAMINE ASPARTATE MONOHYDRATE, DEXTROAMPHETAMINE SULFATE AND AMPHETAMINE SULFATE 5; 5; 5; 5 MG/1; MG/1; MG/1; MG/1
20 CAPSULE, EXTENDED RELEASE ORAL EVERY MORNING
Qty: 90 CAPSULE | Refills: 0 | Status: SHIPPED | OUTPATIENT
Start: 2020-05-18 | End: 2020-08-18 | Stop reason: ALTCHOICE
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
## 2020-08-04 ENCOUNTER — PATIENT OUTREACH (OUTPATIENT)
Dept: ADMINISTRATIVE | Facility: HOSPITAL | Age: 45
End: 2020-08-04

## 2020-08-04 DIAGNOSIS — Z11.59 NEED FOR HEPATITIS C SCREENING TEST: Primary | ICD-10-CM

## 2020-08-18 ENCOUNTER — OFFICE VISIT (OUTPATIENT)
Dept: FAMILY MEDICINE | Facility: CLINIC | Age: 45
End: 2020-08-18
Payer: COMMERCIAL

## 2020-08-18 VITALS
DIASTOLIC BLOOD PRESSURE: 80 MMHG | BODY MASS INDEX: 30.26 KG/M2 | HEART RATE: 97 BPM | SYSTOLIC BLOOD PRESSURE: 120 MMHG | HEIGHT: 64 IN | WEIGHT: 177.25 LBS | TEMPERATURE: 98 F | OXYGEN SATURATION: 98 %

## 2020-08-18 DIAGNOSIS — M25.511 RIGHT SHOULDER PAIN, UNSPECIFIED CHRONICITY: Primary | ICD-10-CM

## 2020-08-18 DIAGNOSIS — F90.9 ATTENTION DEFICIT HYPERACTIVITY DISORDER (ADHD), UNSPECIFIED ADHD TYPE: ICD-10-CM

## 2020-08-18 PROCEDURE — 99999 PR PBB SHADOW E&M-EST. PATIENT-LVL III: CPT | Mod: PBBFAC,,, | Performed by: FAMILY MEDICINE

## 2020-08-18 PROCEDURE — 99999 PR PBB SHADOW E&M-EST. PATIENT-LVL III: ICD-10-PCS | Mod: PBBFAC,,, | Performed by: FAMILY MEDICINE

## 2020-08-18 PROCEDURE — 99214 PR OFFICE/OUTPT VISIT, EST, LEVL IV, 30-39 MIN: ICD-10-PCS | Mod: S$GLB,,, | Performed by: FAMILY MEDICINE

## 2020-08-18 PROCEDURE — 99214 OFFICE O/P EST MOD 30 MIN: CPT | Mod: S$GLB,,, | Performed by: FAMILY MEDICINE

## 2020-08-18 RX ORDER — DEXTROAMPHETAMINE SACCHARATE, AMPHETAMINE ASPARTATE MONOHYDRATE, DEXTROAMPHETAMINE SULFATE AND AMPHETAMINE SULFATE 5; 5; 5; 5 MG/1; MG/1; MG/1; MG/1
20 CAPSULE, EXTENDED RELEASE ORAL EVERY MORNING
Qty: 90 CAPSULE | Refills: 0 | Status: CANCELLED | OUTPATIENT
Start: 2020-08-18 | End: 2020-09-16

## 2020-08-18 RX ORDER — DEXTROAMPHETAMINE SACCHARATE, AMPHETAMINE ASPARTATE MONOHYDRATE, DEXTROAMPHETAMINE SULFATE AND AMPHETAMINE SULFATE 6.25; 6.25; 6.25; 6.25 MG/1; MG/1; MG/1; MG/1
25 CAPSULE, EXTENDED RELEASE ORAL EVERY MORNING
Qty: 90 CAPSULE | Refills: 0 | Status: SHIPPED | OUTPATIENT
Start: 2020-08-18 | End: 2020-11-17 | Stop reason: SDUPTHER

## 2020-08-18 NOTE — PROGRESS NOTES
Routine Office Visit    Patient Name: Fadumo Barnhart    : 1975  MRN: [id number]    Subjective:  Fadumo is a 45 y.o. female who presents today for     1. 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 She does not abuse medication. She does not take more than prescribed. She denies any side effect from medications such as weight loss, hypertension, chest pain, heart palpitations or anxiety.  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 She is requesting a 90 days supply for mail order.   2. Right shoulder pain - started a few months ago - Patient has decreased range of motion in her right shoulder. She has been stretching at home to improve range of motion. Range of motion is restricted by pain. Pain is described as ache in the joint.     Review of Systems   Constitutional: Negative for chills and fever.   HENT: Negative for congestion.    Eyes: Negative for blurred vision.   Respiratory: Negative for cough.    Cardiovascular: Negative for chest pain.   Gastrointestinal: Negative for abdominal pain, constipation, diarrhea, heartburn, nausea and vomiting.   Genitourinary: Negative for dysuria.   Musculoskeletal: Positive for joint pain. Negative for myalgias.   Skin: Negative for itching and rash.   Neurological: Negative for dizziness and headaches.   Psychiatric/Behavioral: Negative for depression.       Active Problem List  Patient Active Problem List   Diagnosis    ADHD (attention deficit hyperactivity disorder)    GERD (gastroesophageal reflux disease)       Past Surgical History  Past Surgical History:   Procedure Laterality Date    GASTRIC BYPASS      LEEP      SMALL INTESTINE SURGERY      intestine were twisted    TONSILLECTOMY      uterine  embolization  12/21/2018       Family History  Family History   Problem Relation Age of Onset    Diabetes Mother     Heart disease Mother     Hypertension Mother     Kidney disease Mother     Miscarriages / Stillbirths Mother     Vision loss Mother     Hypertension Father     Alcohol abuse Father         prior, not current    Alcohol abuse Brother     Drug abuse Brother     Learning disabilities Brother         ADD/ADHD, dyslexia    Alcohol abuse Paternal Uncle     Cancer Paternal Uncle     Depression Paternal Uncle     Early death Paternal Uncle     Diabetes Maternal Aunt     Miscarriages / Stillbirths Maternal Aunt     Diabetes Maternal Uncle     Kidney disease Maternal Uncle        Social History  Social History     Socioeconomic History    Marital status:      Spouse name: Not on file    Number of children: Not on file    Years of education: Not on file    Highest education level: Not on file   Occupational History     Employer: [employer]   Social Needs    Financial resource strain: Not on file    Food insecurity     Worry: Not on file     Inability: Not on file    Transportation needs     Medical: Not on file     Non-medical: Not on file   Tobacco Use    Smoking status: Never Smoker    Smokeless tobacco: Never Used   Substance and Sexual Activity    Alcohol use: Yes    Drug use: No    Sexual activity: Yes     Partners: Male   Lifestyle    Physical activity     Days per week: Not on file     Minutes per session: Not on file    Stress: Only a little   Relationships    Social connections     Talks on phone: Not on file     Gets together: Not on file     Attends Congregational service: Not on file     Active member of club or organization: Not on file     Attends meetings of clubs or organizations: Not on file     Relationship status: Not on file   Other Topics Concern    Not on file   Social History Narrative    Not on file       Medications and  "Allergies  Reviewed and updated.   Current Outpatient Medications   Medication Sig    pantoprazole (PROTONIX) 40 MG tablet TAKE 1 TABLET DAILY    dextroamphetamine-amphetamine (ADDERALL XR) 25 MG 24 hr capsule Take 1 capsule (25 mg total) by mouth every morning.    ibuprofen (ADVIL,MOTRIN) 800 MG tablet Take 1 tablet (800 mg total) by mouth 3 (three) times daily. (Patient not taking: Reported on 5/18/2020)     No current facility-administered medications for this visit.        Physical Exam  /80 (BP Location: Left arm, Patient Position: Sitting, BP Method: Large (Manual))   Pulse 97   Temp 97.7 °F (36.5 °C) (Temporal)   Ht 5' 4" (1.626 m)   Wt 80.4 kg (177 lb 4 oz)   LMP 07/30/2020 Comment: birth control  SpO2 98%   BMI 30.42 kg/m²   Physical Exam  Constitutional:       Appearance: She is well-developed.   HENT:      Head: Normocephalic and atraumatic.   Eyes:      Conjunctiva/sclera: Conjunctivae normal.      Pupils: Pupils are equal, round, and reactive to light.   Neck:      Musculoskeletal: Normal range of motion and neck supple.   Cardiovascular:      Rate and Rhythm: Normal rate and regular rhythm.      Heart sounds: Normal heart sounds. No murmur. No friction rub. No gallop.    Pulmonary:      Effort: No respiratory distress.      Breath sounds: Normal breath sounds.   Abdominal:      General: Bowel sounds are normal. There is no distension.      Palpations: Abdomen is soft.      Tenderness: There is no abdominal tenderness.   Musculoskeletal: Normal range of motion.      Right shoulder: She exhibits tenderness and spasm.   Lymphadenopathy:      Cervical: No cervical adenopathy.   Skin:     General: Skin is warm.   Neurological:      Mental Status: She is alert and oriented to person, place, and time.           Assessment/Plan:  Fadumo Barnhart is a 45 y.o. female who presents today for :    Problem List Items Addressed This Visit        Psychiatric    ADHD (attention deficit hyperactivity " disorder)    Relevant Medications    dextroamphetamine-amphetamine (ADDERALL XR) 25 MG 24 hr capsule  Reviewed LA   The current medical regimen is effective;  continue present plan and medications.  Increase to 25mg         Other Visit Diagnoses     Right shoulder pain, unspecified chronicity    -  Primary  Recommend stretching   Consider f/u with ortho             Follow up in about 3 months (around 11/18/2020), or if symptoms worsen or fail to improve.

## 2020-11-17 ENCOUNTER — OFFICE VISIT (OUTPATIENT)
Dept: FAMILY MEDICINE | Facility: CLINIC | Age: 45
End: 2020-11-17
Payer: COMMERCIAL

## 2020-11-17 VITALS
TEMPERATURE: 99 F | SYSTOLIC BLOOD PRESSURE: 120 MMHG | OXYGEN SATURATION: 99 % | WEIGHT: 177 LBS | HEART RATE: 80 BPM | DIASTOLIC BLOOD PRESSURE: 78 MMHG | HEIGHT: 64 IN | BODY MASS INDEX: 30.22 KG/M2

## 2020-11-17 DIAGNOSIS — F90.9 ATTENTION DEFICIT HYPERACTIVITY DISORDER (ADHD), UNSPECIFIED ADHD TYPE: Primary | ICD-10-CM

## 2020-11-17 DIAGNOSIS — K21.9 GASTROESOPHAGEAL REFLUX DISEASE WITHOUT ESOPHAGITIS: ICD-10-CM

## 2020-11-17 PROCEDURE — 99999 PR PBB SHADOW E&M-EST. PATIENT-LVL III: ICD-10-PCS | Mod: PBBFAC,,, | Performed by: FAMILY MEDICINE

## 2020-11-17 PROCEDURE — 99214 PR OFFICE/OUTPT VISIT, EST, LEVL IV, 30-39 MIN: ICD-10-PCS | Mod: S$GLB,,, | Performed by: FAMILY MEDICINE

## 2020-11-17 PROCEDURE — 99214 OFFICE O/P EST MOD 30 MIN: CPT | Mod: S$GLB,,, | Performed by: FAMILY MEDICINE

## 2020-11-17 PROCEDURE — 99999 PR PBB SHADOW E&M-EST. PATIENT-LVL III: CPT | Mod: PBBFAC,,, | Performed by: FAMILY MEDICINE

## 2020-11-17 RX ORDER — DEXTROAMPHETAMINE SACCHARATE, AMPHETAMINE ASPARTATE MONOHYDRATE, DEXTROAMPHETAMINE SULFATE AND AMPHETAMINE SULFATE 6.25; 6.25; 6.25; 6.25 MG/1; MG/1; MG/1; MG/1
25 CAPSULE, EXTENDED RELEASE ORAL EVERY MORNING
Qty: 90 CAPSULE | Refills: 0 | Status: SHIPPED | OUTPATIENT
Start: 2020-11-17 | End: 2021-02-12 | Stop reason: SDUPTHER

## 2020-11-17 NOTE — PROGRESS NOTES
Routine Office Visit    Patient Name: Fadumo Barnhart    : 1975  MRN: [id number]    Subjective:  Fadumo is a 45 y.o. female who presents today for     1. 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 She does not abuse medication. She does not take more than prescribed. She denies any side effect from medications such as weight loss, hypertension, chest pain, heart palpitations or anxiety.  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  She is requesting a 90 days supply for mail order.     Review of Systems   Constitutional: Negative for chills and fever.   HENT: Negative for congestion.    Eyes: Negative for blurred vision.   Respiratory: Negative for cough.    Cardiovascular: Negative for chest pain.   Gastrointestinal: Negative for abdominal pain, constipation, diarrhea, heartburn, nausea and vomiting.   Genitourinary: Negative for dysuria.   Musculoskeletal: Negative for myalgias.   Skin: Negative for itching and rash.   Neurological: Negative for dizziness and headaches.   Psychiatric/Behavioral: Negative for depression.       Active Problem List  Patient Active Problem List   Diagnosis    ADHD (attention deficit hyperactivity disorder)    GERD (gastroesophageal reflux disease)       Past Surgical History  Past Surgical History:   Procedure Laterality Date    GASTRIC BYPASS      LEEP      SMALL INTESTINE SURGERY      intestine were twisted    TONSILLECTOMY      uterine embolization  2018       Family History  Family History   Problem Relation Age of Onset    Diabetes Mother     Heart disease Mother     Hypertension Mother     Kidney disease Mother     Miscarriages / Stillbirths Mother     Vision loss Mother     Hypertension Father     Alcohol abuse Father         prior, not current    Alcohol abuse Brother     Drug abuse  Brother     Learning disabilities Brother         ADD/ADHD, dyslexia    Alcohol abuse Paternal Uncle     Cancer Paternal Uncle     Depression Paternal Uncle     Early death Paternal Uncle     Diabetes Maternal Aunt     Miscarriages / Stillbirths Maternal Aunt     Diabetes Maternal Uncle     Kidney disease Maternal Uncle        Social History  Social History     Socioeconomic History    Marital status:      Spouse name: Not on file    Number of children: Not on file    Years of education: Not on file    Highest education level: Not on file   Occupational History     Employer: [employer]   Social Needs    Financial resource strain: Not on file    Food insecurity     Worry: Not on file     Inability: Not on file    Transportation needs     Medical: Not on file     Non-medical: Not on file   Tobacco Use    Smoking status: Never Smoker    Smokeless tobacco: Never Used   Substance and Sexual Activity    Alcohol use: Yes    Drug use: No    Sexual activity: Yes     Partners: Male   Lifestyle    Physical activity     Days per week: Not on file     Minutes per session: Not on file    Stress: Only a little   Relationships    Social connections     Talks on phone: Not on file     Gets together: Not on file     Attends Scientologist service: Not on file     Active member of club or organization: Not on file     Attends meetings of clubs or organizations: Not on file     Relationship status: Not on file   Other Topics Concern    Not on file   Social History Narrative    Not on file       Medications and Allergies  Reviewed and updated.   Current Outpatient Medications   Medication Sig    dextroamphetamine-amphetamine (ADDERALL XR) 25 MG 24 hr capsule Take 1 capsule (25 mg total) by mouth every morning.    ibuprofen (ADVIL,MOTRIN) 800 MG tablet Take 1 tablet (800 mg total) by mouth 3 (three) times daily.    multivitamin capsule Take 1 capsule by mouth once daily.    pantoprazole  "(PROTONIX) 40 MG tablet TAKE 1 TABLET DAILY     No current facility-administered medications for this visit.        Physical Exam  /78 (BP Location: Left arm, Patient Position: Sitting, BP Method: Large (Manual))   Pulse 80   Temp 98.6 °F (37 °C) (Oral)   Ht 5' 4" (1.626 m)   Wt 80.3 kg (177 lb 0.5 oz)   LMP 10/05/2020 (Approximate)   SpO2 99%   BMI 30.39 kg/m²   Physical Exam  Constitutional:       Appearance: She is well-developed.   HENT:      Head: Normocephalic and atraumatic.   Eyes:      Conjunctiva/sclera: Conjunctivae normal.      Pupils: Pupils are equal, round, and reactive to light.   Neck:      Musculoskeletal: Normal range of motion and neck supple.   Cardiovascular:      Rate and Rhythm: Normal rate and regular rhythm.      Heart sounds: Normal heart sounds. No murmur. No friction rub. No gallop.    Pulmonary:      Effort: No respiratory distress.      Breath sounds: Normal breath sounds.   Abdominal:      General: Bowel sounds are normal. There is no distension.      Palpations: Abdomen is soft.      Tenderness: There is no abdominal tenderness.   Musculoskeletal: Normal range of motion.   Lymphadenopathy:      Cervical: No cervical adenopathy.   Skin:     General: Skin is warm.   Neurological:      Mental Status: She is alert and oriented to person, place, and time.           Assessment/Plan:  Fadumo Barnhart is a 45 y.o. female who presents today for :    Problem List Items Addressed This Visit        Psychiatric    ADHD (attention deficit hyperactivity disorder) - Primary    Relevant Medications    dextroamphetamine-amphetamine (ADDERALL XR) 25 MG 24 hr capsule  The current medical regimen is effective;  continue present plan and medications.         GI    GERD (gastroesophageal reflux disease)  The current medical regimen is effective;  continue present plan and medications.            Follow up in about 3 months (around 2/17/2021), or if symptoms worsen or fail to " improve.

## 2021-01-09 ENCOUNTER — HOSPITAL ENCOUNTER (EMERGENCY)
Facility: HOSPITAL | Age: 46
Discharge: HOME | End: 2021-01-09
Attending: INTERNAL MEDICINE
Payer: COMMERCIAL

## 2021-01-09 VITALS
TEMPERATURE: 99 F | DIASTOLIC BLOOD PRESSURE: 81 MMHG | WEIGHT: 173 LBS | RESPIRATION RATE: 18 BRPM | BODY MASS INDEX: 29.7 KG/M2 | SYSTOLIC BLOOD PRESSURE: 137 MMHG | HEART RATE: 92 BPM | OXYGEN SATURATION: 99 %

## 2021-01-09 DIAGNOSIS — M62.838 TRAPEZIUS MUSCLE SPASM: ICD-10-CM

## 2021-01-09 DIAGNOSIS — M54.9 BACK PAIN: ICD-10-CM

## 2021-01-09 DIAGNOSIS — M25.569 KNEE PAIN: ICD-10-CM
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
## 2021-01-09 DIAGNOSIS — M54.2 NECK PAIN: ICD-10-CM

## 2021-01-09 LAB
B-HCG UR QL: NEGATIVE
CTP QC/QA: YES

## 2021-01-09 PROCEDURE — 99284 EMERGENCY DEPT VISIT MOD MDM: CPT | Mod: 25,ER

## 2021-01-09 PROCEDURE — 81025 URINE PREGNANCY TEST: CPT | Mod: ER | Performed by: EMERGENCY MEDICINE

## 2021-01-09 RX ORDER — ACETAMINOPHEN 500 MG
1000 TABLET ORAL
Status: DISCONTINUED | OUTPATIENT
Start: 2021-01-09 | End: 2021-01-09 | Stop reason: HOSPADM

## 2021-01-09 RX ORDER — NAPROXEN 500 MG/1
500 TABLET ORAL 2 TIMES DAILY PRN
Qty: 10 TABLET | Refills: 0 | Status: SHIPPED | OUTPATIENT
Start: 2021-01-09 | End: 2021-01-14

## 2021-01-09 RX ORDER — METHOCARBAMOL 500 MG/1
1000 TABLET, FILM COATED ORAL 3 TIMES DAILY PRN
Qty: 18 TABLET | Refills: 0 | Status: SHIPPED | OUTPATIENT
Start: 2021-01-09 | End: 2022-04-22

## 2021-01-29 ENCOUNTER — PATIENT OUTREACH (OUTPATIENT)
Dept: ADMINISTRATIVE | Facility: HOSPITAL | Age: 46
End: 2021-01-29

## 2021-01-29 DIAGNOSIS — Z12.31 ENCOUNTER FOR SCREENING MAMMOGRAM FOR MALIGNANT NEOPLASM OF BREAST: Primary | ICD-10-CM

## 2021-02-12 ENCOUNTER — OFFICE VISIT (OUTPATIENT)
Dept: FAMILY MEDICINE | Facility: CLINIC | Age: 46
End: 2021-02-12
Payer: COMMERCIAL

## 2021-02-12 VITALS
WEIGHT: 174.63 LBS | DIASTOLIC BLOOD PRESSURE: 70 MMHG | SYSTOLIC BLOOD PRESSURE: 128 MMHG | TEMPERATURE: 98 F | OXYGEN SATURATION: 99 % | HEART RATE: 78 BPM | BODY MASS INDEX: 29.97 KG/M2

## 2021-02-12 DIAGNOSIS — F90.9 ATTENTION DEFICIT HYPERACTIVITY DISORDER (ADHD), UNSPECIFIED ADHD TYPE: ICD-10-CM

## 2021-02-12 DIAGNOSIS — K21.9 GASTROESOPHAGEAL REFLUX DISEASE WITHOUT ESOPHAGITIS: ICD-10-CM

## 2021-02-12 DIAGNOSIS — Z23 NEED FOR PROPHYLACTIC VACCINATION AND INOCULATION AGAINST INFLUENZA: ICD-10-CM

## 2021-02-12 DIAGNOSIS — Z00.00 ANNUAL PHYSICAL EXAM: Primary | ICD-10-CM

## 2021-02-12 PROCEDURE — 90471 FLU VACCINE (QUAD) GREATER THAN OR EQUAL TO 3YO PRESERVATIVE FREE IM: ICD-10-PCS | Mod: S$GLB,,, | Performed by: FAMILY MEDICINE

## 2021-02-12 PROCEDURE — 99999 PR PBB SHADOW E&M-EST. PATIENT-LVL III: ICD-10-PCS | Mod: PBBFAC,,, | Performed by: FAMILY MEDICINE

## 2021-02-12 PROCEDURE — 90686 FLU VACCINE (QUAD) GREATER THAN OR EQUAL TO 3YO PRESERVATIVE FREE IM: ICD-10-PCS | Mod: S$GLB,,, | Performed by: FAMILY MEDICINE

## 2021-02-12 PROCEDURE — 99999 PR PBB SHADOW E&M-EST. PATIENT-LVL III: CPT | Mod: PBBFAC,,, | Performed by: FAMILY MEDICINE

## 2021-02-12 PROCEDURE — 90471 IMMUNIZATION ADMIN: CPT | Mod: S$GLB,,, | Performed by: FAMILY MEDICINE

## 2021-02-12 PROCEDURE — 99396 PREV VISIT EST AGE 40-64: CPT | Mod: 25,S$GLB,, | Performed by: FAMILY MEDICINE

## 2021-02-12 PROCEDURE — 99396 PR PREVENTIVE VISIT,EST,40-64: ICD-10-PCS | Mod: 25,S$GLB,, | Performed by: FAMILY MEDICINE

## 2021-02-12 PROCEDURE — 90686 IIV4 VACC NO PRSV 0.5 ML IM: CPT | Mod: S$GLB,,, | Performed by: FAMILY MEDICINE

## 2021-02-12 RX ORDER — DEXTROAMPHETAMINE SACCHARATE, AMPHETAMINE ASPARTATE MONOHYDRATE, DEXTROAMPHETAMINE SULFATE AND AMPHETAMINE SULFATE 6.25; 6.25; 6.25; 6.25 MG/1; MG/1; MG/1; MG/1
25 CAPSULE, EXTENDED RELEASE ORAL EVERY MORNING
Qty: 90 CAPSULE | Refills: 0 | Status: SHIPPED | OUTPATIENT
Start: 2021-02-12 | End: 2021-05-06 | Stop reason: SDUPTHER

## 2021-02-13 ENCOUNTER — LAB VISIT (OUTPATIENT)
Dept: LAB | Facility: HOSPITAL | Age: 46
End: 2021-02-13
Attending: FAMILY MEDICINE
Payer: COMMERCIAL

## 2021-02-13 DIAGNOSIS — Z11.59 NEED FOR HEPATITIS C SCREENING TEST: ICD-10-CM

## 2021-02-13 DIAGNOSIS — Z00.00 ANNUAL PHYSICAL EXAM: ICD-10-CM

## 2021-02-13 LAB
ALBUMIN SERPL BCP-MCNC: 4.1 G/DL (ref 3.5–5.2)
ALP SERPL-CCNC: 68 U/L (ref 55–135)
ALT SERPL W/O P-5'-P-CCNC: 20 U/L (ref 10–44)
ANION GAP SERPL CALC-SCNC: 9 MMOL/L (ref 8–16)
AST SERPL-CCNC: 22 U/L (ref 10–40)
BASOPHILS # BLD AUTO: 0.01 K/UL (ref 0–0.2)
BASOPHILS NFR BLD: 0.3 % (ref 0–1.9)
BILIRUB SERPL-MCNC: 0.7 MG/DL (ref 0.1–1)
BUN SERPL-MCNC: 11 MG/DL (ref 6–20)
CALCIUM SERPL-MCNC: 8.9 MG/DL (ref 8.7–10.5)
CHLORIDE SERPL-SCNC: 105 MMOL/L (ref 95–110)
CHOLEST SERPL-MCNC: 161 MG/DL (ref 120–199)
CHOLEST/HDLC SERPL: 1.9 {RATIO} (ref 2–5)
CO2 SERPL-SCNC: 25 MMOL/L (ref 23–29)
CREAT SERPL-MCNC: 0.8 MG/DL (ref 0.5–1.4)
DIFFERENTIAL METHOD: ABNORMAL
EOSINOPHIL # BLD AUTO: 0.1 K/UL (ref 0–0.5)
EOSINOPHIL NFR BLD: 1.7 % (ref 0–8)
ERYTHROCYTE [DISTWIDTH] IN BLOOD BY AUTOMATED COUNT: 13.2 % (ref 11.5–14.5)
EST. GFR  (AFRICAN AMERICAN): >60 ML/MIN/1.73 M^2
EST. GFR  (NON AFRICAN AMERICAN): >60 ML/MIN/1.73 M^2
ESTIMATED AVG GLUCOSE: 88 MG/DL (ref 68–131)
GLUCOSE SERPL-MCNC: 100 MG/DL (ref 70–110)
HBA1C MFR BLD: 4.7 % (ref 4–5.6)
HCT VFR BLD AUTO: 37.1 % (ref 37–48.5)
HDLC SERPL-MCNC: 83 MG/DL (ref 40–75)
HDLC SERPL: 51.6 % (ref 20–50)
HGB BLD-MCNC: 11.8 G/DL (ref 12–16)
IMM GRANULOCYTES # BLD AUTO: 0 K/UL (ref 0–0.04)
IMM GRANULOCYTES NFR BLD AUTO: 0 % (ref 0–0.5)
LDLC SERPL CALC-MCNC: 68.2 MG/DL (ref 63–159)
LYMPHOCYTES # BLD AUTO: 1.7 K/UL (ref 1–4.8)
LYMPHOCYTES NFR BLD: 57.3 % (ref 18–48)
MCH RBC QN AUTO: 30.2 PG (ref 27–31)
MCHC RBC AUTO-ENTMCNC: 31.8 G/DL (ref 32–36)
MCV RBC AUTO: 95 FL (ref 82–98)
MONOCYTES # BLD AUTO: 0.4 K/UL (ref 0.3–1)
MONOCYTES NFR BLD: 12.9 % (ref 4–15)
NEUTROPHILS # BLD AUTO: 0.8 K/UL (ref 1.8–7.7)
NEUTROPHILS NFR BLD: 27.8 % (ref 38–73)
NONHDLC SERPL-MCNC: 78 MG/DL
NRBC BLD-RTO: 0 /100 WBC
PLATELET # BLD AUTO: 227 K/UL (ref 150–350)
PLATELET BLD QL SMEAR: ABNORMAL
PMV BLD AUTO: 11.1 FL (ref 9.2–12.9)
POTASSIUM SERPL-SCNC: 3.8 MMOL/L (ref 3.5–5.1)
PROT SERPL-MCNC: 7.4 G/DL (ref 6–8.4)
RBC # BLD AUTO: 3.91 M/UL (ref 4–5.4)
SMUDGE CELLS BLD QL SMEAR: PRESENT
SODIUM SERPL-SCNC: 139 MMOL/L (ref 136–145)
TRIGL SERPL-MCNC: 49 MG/DL (ref 30–150)
TSH SERPL DL<=0.005 MIU/L-ACNC: 0.94 UIU/ML (ref 0.4–4)
WBC # BLD AUTO: 2.95 K/UL (ref 3.9–12.7)

## 2021-02-13 PROCEDURE — 84443 ASSAY THYROID STIM HORMONE: CPT

## 2021-02-13 PROCEDURE — 36415 COLL VENOUS BLD VENIPUNCTURE: CPT | Mod: PO

## 2021-02-13 PROCEDURE — 86803 HEPATITIS C AB TEST: CPT

## 2021-02-13 PROCEDURE — 80053 COMPREHEN METABOLIC PANEL: CPT

## 2021-02-13 PROCEDURE — 83036 HEMOGLOBIN GLYCOSYLATED A1C: CPT

## 2021-02-13 PROCEDURE — 86703 HIV-1/HIV-2 1 RESULT ANTBDY: CPT

## 2021-02-13 PROCEDURE — 80061 LIPID PANEL: CPT

## 2021-02-13 PROCEDURE — 85025 COMPLETE CBC W/AUTO DIFF WBC: CPT

## 2021-02-15 LAB
HCV AB SERPL QL IA: NEGATIVE
HIV 1+2 AB+HIV1 P24 AG SERPL QL IA: NEGATIVE

## 2021-04-27 ENCOUNTER — TELEPHONE (OUTPATIENT)
Dept: FAMILY MEDICINE | Facility: CLINIC | Age: 46
End: 2021-04-27

## 2021-05-06 ENCOUNTER — OFFICE VISIT (OUTPATIENT)
Dept: FAMILY MEDICINE | Facility: CLINIC | Age: 46
End: 2021-05-06
Payer: COMMERCIAL

## 2021-05-06 VITALS
HEIGHT: 64 IN | TEMPERATURE: 98 F | DIASTOLIC BLOOD PRESSURE: 82 MMHG | SYSTOLIC BLOOD PRESSURE: 120 MMHG | WEIGHT: 176.13 LBS | BODY MASS INDEX: 30.07 KG/M2 | OXYGEN SATURATION: 98 % | HEART RATE: 102 BPM

## 2021-05-06 DIAGNOSIS — F90.9 ATTENTION DEFICIT HYPERACTIVITY DISORDER (ADHD), UNSPECIFIED ADHD TYPE: ICD-10-CM

## 2021-05-06 DIAGNOSIS — K21.9 GASTROESOPHAGEAL REFLUX DISEASE WITHOUT ESOPHAGITIS: ICD-10-CM

## 2021-05-06 PROCEDURE — 99999 PR PBB SHADOW E&M-EST. PATIENT-LVL III: CPT | Mod: PBBFAC,,, | Performed by: FAMILY MEDICINE

## 2021-05-06 PROCEDURE — 99214 PR OFFICE/OUTPT VISIT, EST, LEVL IV, 30-39 MIN: ICD-10-PCS | Mod: S$GLB,,, | Performed by: FAMILY MEDICINE

## 2021-05-06 PROCEDURE — 99214 OFFICE O/P EST MOD 30 MIN: CPT | Mod: S$GLB,,, | Performed by: FAMILY MEDICINE

## 2021-05-06 PROCEDURE — 99999 PR PBB SHADOW E&M-EST. PATIENT-LVL III: ICD-10-PCS | Mod: PBBFAC,,, | Performed by: FAMILY MEDICINE

## 2021-05-06 RX ORDER — PANTOPRAZOLE SODIUM 40 MG/1
40 TABLET, DELAYED RELEASE ORAL DAILY
Qty: 90 TABLET | Refills: 3 | Status: SHIPPED | OUTPATIENT
Start: 2021-05-06 | End: 2021-09-20

## 2021-05-06 RX ORDER — DEXTROAMPHETAMINE SACCHARATE, AMPHETAMINE ASPARTATE MONOHYDRATE, DEXTROAMPHETAMINE SULFATE AND AMPHETAMINE SULFATE 6.25; 6.25; 6.25; 6.25 MG/1; MG/1; MG/1; MG/1
25 CAPSULE, EXTENDED RELEASE ORAL EVERY MORNING
Qty: 90 CAPSULE | Refills: 0 | Status: SHIPPED | OUTPATIENT
Start: 2021-05-06 | End: 2021-08-06 | Stop reason: SDUPTHER

## 2021-06-14 ENCOUNTER — NURSE TRIAGE (OUTPATIENT)
Dept: ADMINISTRATIVE | Facility: CLINIC | Age: 46
End: 2021-06-14

## 2021-06-14 ENCOUNTER — HOSPITAL ENCOUNTER (EMERGENCY)
Facility: HOSPITAL | Age: 46
Discharge: HOME | End: 2021-06-14
Attending: EMERGENCY MEDICINE
Payer: COMMERCIAL

## 2021-06-14 VITALS
OXYGEN SATURATION: 100 % | SYSTOLIC BLOOD PRESSURE: 136 MMHG | HEART RATE: 85 BPM | DIASTOLIC BLOOD PRESSURE: 80 MMHG | WEIGHT: 173 LBS | BODY MASS INDEX: 28.82 KG/M2 | RESPIRATION RATE: 18 BRPM | HEIGHT: 65 IN | TEMPERATURE: 99 F

## 2021-06-14 DIAGNOSIS — M54.50 ACUTE BILATERAL LOW BACK PAIN WITHOUT SCIATICA: Primary | ICD-10-CM

## 2021-06-14 LAB
ALBUMIN SERPL-MCNC: 4 G/DL (ref 3.3–5.5)
ALBUMIN SERPL-MCNC: 4.1 G/DL (ref 3.3–5.5)
ALP SERPL-CCNC: 57 U/L (ref 42–141)
ALP SERPL-CCNC: 68 U/L (ref 42–141)
B-HCG UR QL: NEGATIVE
BILIRUB SERPL-MCNC: 0.6 MG/DL (ref 0.2–1.6)
BILIRUB SERPL-MCNC: 0.6 MG/DL (ref 0.2–1.6)
BILIRUBIN, POC UA: NEGATIVE
BLOOD, POC UA: ABNORMAL
BUN SERPL-MCNC: 13 MG/DL (ref 7–22)
CALCIUM SERPL-MCNC: 9 MG/DL (ref 8–10.3)
CHLORIDE SERPL-SCNC: 101 MMOL/L (ref 98–108)
CLARITY, POC UA: CLEAR
COLOR, POC UA: YELLOW
CREAT SERPL-MCNC: 1.3 MG/DL (ref 0.6–1.2)
CTP QC/QA: YES
GLUCOSE SERPL-MCNC: 109 MG/DL (ref 73–118)
GLUCOSE, POC UA: NEGATIVE
KETONES, POC UA: NEGATIVE
LEUKOCYTE EST, POC UA: NEGATIVE
NITRITE, POC UA: NEGATIVE
PH UR STRIP: 5.5 [PH]
POC ALT (SGPT): 15 U/L (ref 10–47)
POC ALT (SGPT): 19 U/L (ref 10–47)
POC AMYLASE: 36 U/L (ref 14–97)
POC AST (SGOT): 28 U/L (ref 11–38)
POC AST (SGOT): 29 U/L (ref 11–38)
POC GGT: 20 U/L (ref 5–65)
POC TCO2: 28 MMOL/L (ref 18–33)
POTASSIUM BLD-SCNC: 3.7 MMOL/L (ref 3.6–5.1)
PROTEIN, POC UA: NEGATIVE
PROTEIN, POC: 6.9 G/DL (ref 6.4–8.1)
PROTEIN, POC: 7.2 G/DL (ref 6.4–8.1)
SODIUM BLD-SCNC: 143 MMOL/L (ref 128–145)
SPECIFIC GRAVITY, POC UA: 1.01
UROBILINOGEN, POC UA: 2 E.U./DL

## 2021-06-14 PROCEDURE — 81003 URINALYSIS AUTO W/O SCOPE: CPT | Mod: ER

## 2021-06-14 PROCEDURE — 82150 ASSAY OF AMYLASE: CPT | Mod: ER

## 2021-06-14 PROCEDURE — 80053 COMPREHEN METABOLIC PANEL: CPT | Mod: ER

## 2021-06-14 PROCEDURE — 81025 URINE PREGNANCY TEST: CPT | Mod: ER | Performed by: EMERGENCY MEDICINE

## 2021-06-14 PROCEDURE — 36000 PLACE NEEDLE IN VEIN: CPT | Mod: ER

## 2021-06-14 PROCEDURE — 85025 COMPLETE CBC W/AUTO DIFF WBC: CPT | Mod: ER

## 2021-06-14 PROCEDURE — 99284 EMERGENCY DEPT VISIT MOD MDM: CPT | Mod: 25,ER

## 2021-06-14 RX ORDER — NAPROXEN 500 MG/1
500 TABLET ORAL 2 TIMES DAILY WITH MEALS
Qty: 20 TABLET | Refills: 0 | Status: SHIPPED | OUTPATIENT
Start: 2021-06-14 | End: 2022-04-22

## 2021-06-14 RX ORDER — METHOCARBAMOL 500 MG/1
500 TABLET, FILM COATED ORAL 2 TIMES DAILY PRN
Qty: 15 TABLET | Refills: 0 | Status: SHIPPED | OUTPATIENT
Start: 2021-06-14 | End: 2021-06-19

## 2021-06-15 ENCOUNTER — OFFICE VISIT (OUTPATIENT)
Dept: FAMILY MEDICINE | Facility: CLINIC | Age: 46
End: 2021-06-15
Payer: COMMERCIAL

## 2021-06-15 VITALS
TEMPERATURE: 98 F | HEART RATE: 94 BPM | DIASTOLIC BLOOD PRESSURE: 80 MMHG | BODY MASS INDEX: 29.68 KG/M2 | SYSTOLIC BLOOD PRESSURE: 130 MMHG | WEIGHT: 178.38 LBS | OXYGEN SATURATION: 96 %

## 2021-06-15 DIAGNOSIS — M54.50 BILATERAL LOW BACK PAIN WITHOUT SCIATICA, UNSPECIFIED CHRONICITY: Primary | ICD-10-CM

## 2021-06-15 PROCEDURE — 99214 OFFICE O/P EST MOD 30 MIN: CPT | Mod: S$GLB,,, | Performed by: FAMILY MEDICINE

## 2021-06-15 PROCEDURE — 99999 PR PBB SHADOW E&M-EST. PATIENT-LVL III: ICD-10-PCS | Mod: PBBFAC,,, | Performed by: FAMILY MEDICINE

## 2021-06-15 PROCEDURE — 99214 PR OFFICE/OUTPT VISIT, EST, LEVL IV, 30-39 MIN: ICD-10-PCS | Mod: S$GLB,,, | Performed by: FAMILY MEDICINE

## 2021-06-15 PROCEDURE — 99999 PR PBB SHADOW E&M-EST. PATIENT-LVL III: CPT | Mod: PBBFAC,,, | Performed by: FAMILY MEDICINE

## 2021-06-15 RX ORDER — IBUPROFEN 800 MG/1
800 TABLET ORAL 3 TIMES DAILY
Qty: 90 TABLET | Refills: 11 | Status: SHIPPED | OUTPATIENT
Start: 2021-06-15 | End: 2021-12-17

## 2021-07-26 ENCOUNTER — TELEPHONE (OUTPATIENT)
Dept: FAMILY MEDICINE | Facility: CLINIC | Age: 46
End: 2021-07-26

## 2021-07-26 ENCOUNTER — PATIENT MESSAGE (OUTPATIENT)
Dept: FAMILY MEDICINE | Facility: CLINIC | Age: 46
End: 2021-07-26

## 2021-07-27 ENCOUNTER — TELEPHONE (OUTPATIENT)
Dept: FAMILY MEDICINE | Facility: CLINIC | Age: 46
End: 2021-07-27

## 2021-08-06 ENCOUNTER — OFFICE VISIT (OUTPATIENT)
Dept: FAMILY MEDICINE | Facility: CLINIC | Age: 46
End: 2021-08-06
Payer: COMMERCIAL

## 2021-08-06 DIAGNOSIS — M54.50 BILATERAL LOW BACK PAIN WITHOUT SCIATICA, UNSPECIFIED CHRONICITY: Primary | ICD-10-CM

## 2021-08-06 DIAGNOSIS — F90.9 ATTENTION DEFICIT HYPERACTIVITY DISORDER (ADHD), UNSPECIFIED ADHD TYPE: ICD-10-CM

## 2021-08-06 PROCEDURE — 99214 PR OFFICE/OUTPT VISIT, EST, LEVL IV, 30-39 MIN: ICD-10-PCS | Mod: 95,,, | Performed by: FAMILY MEDICINE

## 2021-08-06 PROCEDURE — 99214 OFFICE O/P EST MOD 30 MIN: CPT | Mod: 95,,, | Performed by: FAMILY MEDICINE

## 2021-08-06 RX ORDER — DEXTROAMPHETAMINE SACCHARATE, AMPHETAMINE ASPARTATE MONOHYDRATE, DEXTROAMPHETAMINE SULFATE AND AMPHETAMINE SULFATE 6.25; 6.25; 6.25; 6.25 MG/1; MG/1; MG/1; MG/1
25 CAPSULE, EXTENDED RELEASE ORAL EVERY MORNING
Qty: 90 CAPSULE | Refills: 0 | Status: SHIPPED | OUTPATIENT
Start: 2021-08-06 | End: 2021-12-24 | Stop reason: SDUPTHER

## 2021-09-19 DIAGNOSIS — K21.9 GASTROESOPHAGEAL REFLUX DISEASE WITHOUT ESOPHAGITIS: ICD-10-CM

## 2021-09-20 RX ORDER — PANTOPRAZOLE SODIUM 40 MG/1
40 TABLET, DELAYED RELEASE ORAL DAILY
Qty: 90 TABLET | Refills: 3 | Status: SHIPPED | OUTPATIENT
Start: 2021-09-20 | End: 2022-07-22 | Stop reason: SDUPTHER

## 2021-12-08 ENCOUNTER — PATIENT MESSAGE (OUTPATIENT)
Dept: ADMINISTRATIVE | Facility: HOSPITAL | Age: 46
End: 2021-12-08
Payer: COMMERCIAL

## 2021-12-16 DIAGNOSIS — M54.50 BILATERAL LOW BACK PAIN WITHOUT SCIATICA, UNSPECIFIED CHRONICITY: ICD-10-CM

## 2021-12-17 RX ORDER — IBUPROFEN 800 MG/1
TABLET ORAL
Refills: 0 | OUTPATIENT
Start: 2021-12-17

## 2022-03-04 ENCOUNTER — TELEPHONE (OUTPATIENT)
Dept: FAMILY MEDICINE | Facility: CLINIC | Age: 47
End: 2022-03-04
Payer: COMMERCIAL

## 2022-03-04 ENCOUNTER — OFFICE VISIT (OUTPATIENT)
Dept: FAMILY MEDICINE | Facility: CLINIC | Age: 47
End: 2022-03-04
Payer: COMMERCIAL

## 2022-03-04 VITALS
OXYGEN SATURATION: 99 % | BODY MASS INDEX: 34.52 KG/M2 | SYSTOLIC BLOOD PRESSURE: 122 MMHG | WEIGHT: 175.81 LBS | TEMPERATURE: 98 F | DIASTOLIC BLOOD PRESSURE: 76 MMHG | HEART RATE: 90 BPM | HEIGHT: 60 IN

## 2022-03-04 DIAGNOSIS — Z00.00 ANNUAL PHYSICAL EXAM: ICD-10-CM

## 2022-03-04 DIAGNOSIS — Z02.89 ENCOUNTER FOR COMPLETION OF FORM WITH PATIENT: ICD-10-CM

## 2022-03-04 DIAGNOSIS — Z23 NEED FOR PROPHYLACTIC VACCINATION AND INOCULATION AGAINST INFLUENZA: ICD-10-CM

## 2022-03-04 DIAGNOSIS — Z02.1 PHYSICAL EXAM, PRE-EMPLOYMENT: Primary | ICD-10-CM

## 2022-03-04 PROCEDURE — 99396 PREV VISIT EST AGE 40-64: CPT | Mod: 25,S$GLB,, | Performed by: STUDENT IN AN ORGANIZED HEALTH CARE EDUCATION/TRAINING PROGRAM

## 2022-03-04 PROCEDURE — 90686 IIV4 VACC NO PRSV 0.5 ML IM: CPT | Mod: S$GLB,,, | Performed by: STUDENT IN AN ORGANIZED HEALTH CARE EDUCATION/TRAINING PROGRAM

## 2022-03-04 PROCEDURE — 99396 PR PREVENTIVE VISIT,EST,40-64: ICD-10-PCS | Mod: 25,S$GLB,, | Performed by: STUDENT IN AN ORGANIZED HEALTH CARE EDUCATION/TRAINING PROGRAM

## 2022-03-04 PROCEDURE — 90471 FLU VACCINE (QUAD) GREATER THAN OR EQUAL TO 3YO PRESERVATIVE FREE IM: ICD-10-PCS | Mod: S$GLB,,, | Performed by: STUDENT IN AN ORGANIZED HEALTH CARE EDUCATION/TRAINING PROGRAM

## 2022-03-04 PROCEDURE — 99999 PR PBB SHADOW E&M-EST. PATIENT-LVL III: CPT | Mod: PBBFAC,,, | Performed by: STUDENT IN AN ORGANIZED HEALTH CARE EDUCATION/TRAINING PROGRAM

## 2022-03-04 PROCEDURE — 99999 PR PBB SHADOW E&M-EST. PATIENT-LVL III: ICD-10-PCS | Mod: PBBFAC,,, | Performed by: STUDENT IN AN ORGANIZED HEALTH CARE EDUCATION/TRAINING PROGRAM

## 2022-03-04 PROCEDURE — 90471 IMMUNIZATION ADMIN: CPT | Mod: S$GLB,,, | Performed by: STUDENT IN AN ORGANIZED HEALTH CARE EDUCATION/TRAINING PROGRAM

## 2022-03-04 PROCEDURE — 90686 FLU VACCINE (QUAD) GREATER THAN OR EQUAL TO 3YO PRESERVATIVE FREE IM: ICD-10-PCS | Mod: S$GLB,,, | Performed by: STUDENT IN AN ORGANIZED HEALTH CARE EDUCATION/TRAINING PROGRAM

## 2022-03-04 NOTE — PROGRESS NOTES
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    POSITIVE BOLDED  General: fever, chills, fatigue, weight loss  Eyes: vision changes, blurry vision, eye pain  ENT: hearing loss, ringing, dental problems, sinus congestion, sore throat  Resp: SOB, cough, wheeze  CV: chest pain, palpitations, LE swelling  GI: diarrhea/constipation, black/bloody stools, indigestion  : frequency, urgency, dysuria, polydipsia, hematuria  Skin: rashes, wounds, lacerations  Psych: SI, HI, depressed  Endocrine: heat intolerance, cold intolerance    Past Medical History:   Diagnosis Date    ADD (attention deficit disorder with hyperactivity)     ADHD (attention deficit hyperactivity disorder)     Anemia     Anxiety     Back problem     Depression     GERD (gastroesophageal reflux disease)     Hypoglycemia        Past Surgical History:   Procedure Laterality Date    GASTRIC BYPASS      LEEP      SMALL INTESTINE SURGERY      intestine were twisted    TONSILLECTOMY      uterine embolization  12/21/2018       Family History   Problem Relation Age of Onset    Diabetes Mother     Heart disease Mother     Hypertension Mother     Kidney disease Mother     Miscarriages / Stillbirths Mother     Vision loss Mother     Hypertension Father     Alcohol abuse Father         prior, not current    Alcohol abuse Brother     Drug abuse Brother     Learning disabilities Brother         ADD/ADHD, dyslexia     Alcohol abuse Paternal Uncle     Cancer Paternal Uncle     Depression Paternal Uncle     Early death Paternal Uncle     Diabetes Maternal Aunt     Miscarriages / Stillbirths Maternal Aunt     Diabetes Maternal Uncle     Kidney disease Maternal Uncle        Social History     Socioeconomic History    Marital status:    Occupational History     Employer: [email or web address]   Tobacco Use    Smoking status: Never Smoker    Smokeless tobacco: Never Used   Substance and Sexual Activity    Alcohol use: Yes     Comment: OCCASIONAL    Drug use: No    Sexual activity: Yes     Partners: Male         Current Outpatient Medications:     dextroamphetamine-amphetamine (ADDERALL XR) 25 MG 24 hr capsule, Take 1 capsule (25 mg total) by mouth every morning., Disp: 90 capsule, Rfl: 0    ibuprofen (ADVIL,MOTRIN) 800 MG tablet, Take 1 tablet (800 mg total) by mouth every 6 (six) hours as needed for Pain., Disp: 90 tablet, Rfl: 1    multivitamin capsule, Take 1 capsule by mouth once daily., Disp: , Rfl:     pantoprazole (PROTONIX) 40 MG tablet, Take 1 tablet (40 mg total) by mouth once daily., Disp: 90 tablet, Rfl: 0    pantoprazole (PROTONIX) 40 MG tablet, Take 1 tablet (40 mg total) by mouth once daily., Disp: 90 tablet, Rfl: 3    methocarbamoL (ROBAXIN) 500 MG Tab, Take 2 tablets (1,000 mg total) by mouth 3 (three) times daily as needed (Muscle Spasm/Pain). (Patient not taking: Reported on 3/4/2022), Disp: 18 tablet, Rfl: 0    naproxen (NAPROSYN) 500 MG tablet, Take 1 tablet (500 mg total) by mouth 2 (two) times daily with meals. (Patient not taking: Reported on 3/4/2022), Disp: 20 tablet, Rfl: 0        Vitals:    03/04/22 1022   BP: 122/76   Pulse: 90   Temp: 98.2 °F (36.8 °C)       PHYSICAL EXAM    GEN: NAD, AAox3, well nourished  HEENT: NCAT, EOMI, PEERL, no scleral injection, TM normal, moist mucous membranes, oropharynx clear, no erythema, no exudates  NECK: full rom, no cervical  lymphadenopathy, no thyroidmegally  CV: RRR, no m/r/g, trace LE edema  LUNGS: CTAB, non-labored breathing, no wheezes, no crackles  ABD: soft, non-distended, no rebound/guarding, no organomegaly  EXT: n c/c/e, warm, 5/5 UE and LE strength  NEURO: CNII-XII intact no focal deficit  PSYCH: nl affect, no hallucinations, nl speech  Skin: intact, no rashes/lesions/erythema    1. Physical exam, pre-employment  - Rubella antibody, IgG; Future  - Rubeola antibody IgG; Future  - Mumps, IgG Screen; Future  - Hepatitis B Surface Antibody, Qual/Quant; Future  - Varicella zoster antibody, IgG; Future  - QUANTIFERON GOLD TB; Future    2. Encounter for completion of form with patient    Once Tb/titers are complete will be able to complete form    3. Annual physical exam  Will get pap and mammo with GYN    RTC in q6 months for controlled substance managment      Diana Boykin MD  Family Medicine

## 2022-03-08 ENCOUNTER — LAB VISIT (OUTPATIENT)
Dept: LAB | Facility: HOSPITAL | Age: 47
End: 2022-03-08
Attending: STUDENT IN AN ORGANIZED HEALTH CARE EDUCATION/TRAINING PROGRAM
Payer: COMMERCIAL

## 2022-03-08 DIAGNOSIS — Z02.1 PHYSICAL EXAM, PRE-EMPLOYMENT: ICD-10-CM

## 2022-03-08 PROCEDURE — 86480 TB TEST CELL IMMUN MEASURE: CPT | Performed by: STUDENT IN AN ORGANIZED HEALTH CARE EDUCATION/TRAINING PROGRAM

## 2022-03-10 ENCOUNTER — TELEPHONE (OUTPATIENT)
Dept: FAMILY MEDICINE | Facility: CLINIC | Age: 47
End: 2022-03-10
Payer: COMMERCIAL

## 2022-03-10 ENCOUNTER — PATIENT MESSAGE (OUTPATIENT)
Dept: FAMILY MEDICINE | Facility: CLINIC | Age: 47
End: 2022-03-10
Payer: COMMERCIAL

## 2022-03-10 DIAGNOSIS — Z23 NEED FOR MEASLES-MUMPS-RUBELLA (MMR) VACCINE: ICD-10-CM

## 2022-03-10 DIAGNOSIS — Z23: Primary | ICD-10-CM

## 2022-03-10 LAB
GAMMA INTERFERON BACKGROUND BLD IA-ACNC: 0.04 IU/ML
M TB IFN-G CD4+ BCKGRND COR BLD-ACNC: -0.01 IU/ML
M TB IFN-G CD4+CD8+ BCKGRND COR BLD-ACNC: -0.01 IU/ML
MITOGEN IGNF BCKGRD COR BLD-ACNC: >10 IU/ML
TB GOLD PLUS: NEGATIVE

## 2022-03-10 NOTE — TELEPHONE ENCOUNTER
Patient would like you to advise them when to have an appt for measles vaccine ,thank you please advise

## 2022-03-11 ENCOUNTER — CLINICAL SUPPORT (OUTPATIENT)
Dept: FAMILY MEDICINE | Facility: CLINIC | Age: 47
End: 2022-03-11
Payer: COMMERCIAL

## 2022-03-11 ENCOUNTER — PATIENT MESSAGE (OUTPATIENT)
Dept: FAMILY MEDICINE | Facility: CLINIC | Age: 47
End: 2022-03-11

## 2022-03-11 VITALS — TEMPERATURE: 99 F

## 2022-03-11 DIAGNOSIS — Z23 IMMUNIZATION DUE: Primary | ICD-10-CM

## 2022-03-11 DIAGNOSIS — Z23 NEED FOR MENINGITIS VACCINATION: ICD-10-CM

## 2022-03-11 DIAGNOSIS — Z23 NEED FOR MEASLES-MUMPS-RUBELLA (MMR) VACCINE: Primary | ICD-10-CM

## 2022-03-11 DIAGNOSIS — Z23 NEED FOR HEPATITIS B VACCINATION: ICD-10-CM

## 2022-03-11 PROCEDURE — 90707 MMR VACCINE SC: CPT | Mod: S$GLB,,, | Performed by: FAMILY MEDICINE

## 2022-03-11 PROCEDURE — 90471 HEPATITIS B (RECOMBINANT) ADJUVANTED, 2 DOSE: ICD-10-PCS | Mod: S$GLB,,, | Performed by: FAMILY MEDICINE

## 2022-03-11 PROCEDURE — 90471 IMMUNIZATION ADMIN: CPT | Mod: S$GLB,,, | Performed by: FAMILY MEDICINE

## 2022-03-11 PROCEDURE — 90707 MMR VACCINE SQ: ICD-10-PCS | Mod: S$GLB,,, | Performed by: FAMILY MEDICINE

## 2022-03-11 PROCEDURE — 99999 PR PBB SHADOW E&M-EST. PATIENT-LVL I: ICD-10-PCS | Mod: PBBFAC,,,

## 2022-03-11 PROCEDURE — 90739 HEPB VACC 2/4 DOSE ADULT IM: CPT | Mod: S$GLB,,, | Performed by: FAMILY MEDICINE

## 2022-03-11 PROCEDURE — 99999 PR PBB SHADOW E&M-EST. PATIENT-LVL I: CPT | Mod: PBBFAC,,,

## 2022-03-11 PROCEDURE — 90734 MENACWYD/MENACWYCRM VACC IM: CPT | Mod: S$GLB,,, | Performed by: FAMILY MEDICINE

## 2022-03-11 PROCEDURE — 90734 MENINGOCOCCAL CONJUGATE VACCINE 4-VALENT IM (MENVEO): ICD-10-PCS | Mod: S$GLB,,, | Performed by: FAMILY MEDICINE

## 2022-03-11 PROCEDURE — 90472 MMR VACCINE SQ: ICD-10-PCS | Mod: S$GLB,,, | Performed by: FAMILY MEDICINE

## 2022-03-11 PROCEDURE — 90739 HEPATITIS B (RECOMBINANT) ADJUVANTED, 2 DOSE: ICD-10-PCS | Mod: S$GLB,,, | Performed by: FAMILY MEDICINE

## 2022-03-11 PROCEDURE — 90472 IMMUNIZATION ADMIN EACH ADD: CPT | Mod: S$GLB,,, | Performed by: FAMILY MEDICINE

## 2022-03-11 NOTE — PROGRESS NOTES
Patient received MMR vaccine,Hepatitis B vaccine and meningococcal vaccine , tolerated well 15 minute wait advised.

## 2022-04-20 ENCOUNTER — PATIENT MESSAGE (OUTPATIENT)
Dept: FAMILY MEDICINE | Facility: CLINIC | Age: 47
End: 2022-04-20
Payer: COMMERCIAL

## 2022-04-20 DIAGNOSIS — F90.9 ATTENTION DEFICIT HYPERACTIVITY DISORDER (ADHD), UNSPECIFIED ADHD TYPE: ICD-10-CM

## 2022-04-20 RX ORDER — DEXTROAMPHETAMINE SACCHARATE, AMPHETAMINE ASPARTATE MONOHYDRATE, DEXTROAMPHETAMINE SULFATE AND AMPHETAMINE SULFATE 6.25; 6.25; 6.25; 6.25 MG/1; MG/1; MG/1; MG/1
25 CAPSULE, EXTENDED RELEASE ORAL EVERY MORNING
Qty: 90 CAPSULE | Refills: 0 | Status: CANCELLED | OUTPATIENT
Start: 2022-04-20

## 2022-04-20 NOTE — TELEPHONE ENCOUNTER
Patient needs to schedule with me for prescription refill     It's been 8 months since her appointment with me and she does not have follow-up set up   Virtual is ok

## 2022-04-20 NOTE — TELEPHONE ENCOUNTER
----- Message from Neymarnatalio Jh sent at 4/20/2022  1:00 PM CDT -----  Who called?:PT      What is the request in detail:PT would like to schedule her 2ND Dose of her hepatitis vaccine         Can the clinic reply by [facility]?:Yes        Best Call Back Number: [phone]

## 2022-04-20 NOTE — TELEPHONE ENCOUNTER
Called patient to schedule her for her 2nd dose of hepatitis shot and a refill on her medicaition.

## 2022-04-20 NOTE — TELEPHONE ENCOUNTER
No new care gaps identified.  Powered by [employer]. Reference number: [id number].   4/20/2022 1:48:01 PM CDT

## 2022-04-21 ENCOUNTER — CLINICAL SUPPORT (OUTPATIENT)
Dept: FAMILY MEDICINE | Facility: CLINIC | Age: 47
End: 2022-04-21
Payer: COMMERCIAL

## 2022-04-21 DIAGNOSIS — Z23 NEED FOR HEPATITIS B VACCINATION: Primary | ICD-10-CM

## 2022-04-21 DIAGNOSIS — F90.9 ATTENTION DEFICIT HYPERACTIVITY DISORDER (ADHD), UNSPECIFIED ADHD TYPE: ICD-10-CM

## 2022-04-21 PROCEDURE — 90739 HEPB VACC 2/4 DOSE ADULT IM: CPT | Mod: S$GLB,,, | Performed by: FAMILY MEDICINE

## 2022-04-21 PROCEDURE — 90471 HEPATITIS B (RECOMBINANT) ADJUVANTED, 2 DOSE: ICD-10-PCS | Mod: S$GLB,,, | Performed by: FAMILY MEDICINE

## 2022-04-21 PROCEDURE — 99284 EMERGENCY DEPT VISIT MOD MDM: CPT | Mod: ER

## 2022-04-21 PROCEDURE — 90739 HEPATITIS B (RECOMBINANT) ADJUVANTED, 2 DOSE: ICD-10-PCS | Mod: S$GLB,,, | Performed by: FAMILY MEDICINE

## 2022-04-21 PROCEDURE — 90471 IMMUNIZATION ADMIN: CPT | Mod: S$GLB,,, | Performed by: FAMILY MEDICINE

## 2022-04-21 RX ORDER — METHOCARBAMOL 500 MG/1
1000 TABLET, FILM COATED ORAL
Status: DISCONTINUED | OUTPATIENT
Start: 2022-04-21 | End: 2022-04-22

## 2022-04-21 RX ORDER — DEXTROAMPHETAMINE SACCHARATE, AMPHETAMINE ASPARTATE MONOHYDRATE, DEXTROAMPHETAMINE SULFATE AND AMPHETAMINE SULFATE 6.25; 6.25; 6.25; 6.25 MG/1; MG/1; MG/1; MG/1
25 CAPSULE, EXTENDED RELEASE ORAL EVERY MORNING
Qty: 90 CAPSULE | Refills: 0 | Status: CANCELLED | OUTPATIENT
Start: 2022-04-21

## 2022-04-21 NOTE — TELEPHONE ENCOUNTER
I have not seen this patient since august 2021  She will need an appointment  Virtual appointment is ok

## 2022-04-21 NOTE — TELEPHONE ENCOUNTER
Spoke with patient scheduled for virtual visit for tomorrow with Dr. Chi. Patient voiced understanding at this time.

## 2022-04-21 NOTE — TELEPHONE ENCOUNTER
----- Message from Mckenna Glynn sent at 4/21/2022  9:27 AM CDT -----  Regarding: REILLS  PtPage BarnhartAngelmbramez MRN# [id number] needs a refill on her Adderall medicine she can be reach at [phone].

## 2022-04-21 NOTE — TELEPHONE ENCOUNTER
No new care gaps identified.  Powered by [employer]. Reference number: [id number].   4/21/2022 9:45:05 AM CDT

## 2022-04-22 ENCOUNTER — HOSPITAL ENCOUNTER (EMERGENCY)
Facility: HOSPITAL | Age: 47
Discharge: HOME | End: 2022-04-22
Attending: EMERGENCY MEDICINE
Payer: COMMERCIAL

## 2022-04-22 ENCOUNTER — PATIENT MESSAGE (OUTPATIENT)
Dept: FAMILY MEDICINE | Facility: CLINIC | Age: 47
End: 2022-04-22
Payer: COMMERCIAL

## 2022-04-22 ENCOUNTER — OFFICE VISIT (OUTPATIENT)
Dept: FAMILY MEDICINE | Facility: CLINIC | Age: 47
End: 2022-04-22
Payer: COMMERCIAL

## 2022-04-22 VITALS
RESPIRATION RATE: 16 BRPM | OXYGEN SATURATION: 100 % | SYSTOLIC BLOOD PRESSURE: 143 MMHG | TEMPERATURE: 98 F | BODY MASS INDEX: 33.38 KG/M2 | HEIGHT: 60 IN | WEIGHT: 170 LBS | HEART RATE: 65 BPM | DIASTOLIC BLOOD PRESSURE: 84 MMHG
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
## 2022-04-22 DIAGNOSIS — F90.9 ATTENTION DEFICIT HYPERACTIVITY DISORDER (ADHD), UNSPECIFIED ADHD TYPE: Primary | ICD-10-CM

## 2022-04-22 DIAGNOSIS — M62.838 MUSCLE SPASM: ICD-10-CM

## 2022-04-22 PROCEDURE — 99214 PR OFFICE/OUTPT VISIT, EST, LEVL IV, 30-39 MIN: ICD-10-PCS | Mod: 95,,, | Performed by: FAMILY MEDICINE

## 2022-04-22 PROCEDURE — 99214 OFFICE O/P EST MOD 30 MIN: CPT | Mod: 95,,, | Performed by: FAMILY MEDICINE

## 2022-04-22 RX ORDER — IBUPROFEN 400 MG/1
400 TABLET ORAL EVERY 6 HOURS PRN
Qty: 20 TABLET | Refills: 0 | Status: SHIPPED | OUTPATIENT
Start: 2022-04-22 | End: 2022-07-22

## 2022-04-22 RX ORDER — ACETAMINOPHEN 500 MG
1000 TABLET ORAL EVERY 6 HOURS PRN
Qty: 60 TABLET | Refills: 0 | Status: SHIPPED | OUTPATIENT
Start: 2022-04-22 | End: 2022-07-22

## 2022-04-22 RX ORDER — CYCLOBENZAPRINE HCL 10 MG
10 TABLET ORAL 3 TIMES DAILY PRN
Qty: 15 TABLET | Refills: 0 | Status: SHIPPED | OUTPATIENT
Start: 2022-04-22 | End: 2022-04-27

## 2022-04-22 RX ORDER — DEXTROAMPHETAMINE SACCHARATE, AMPHETAMINE ASPARTATE MONOHYDRATE, DEXTROAMPHETAMINE SULFATE AND AMPHETAMINE SULFATE 7.5; 7.5; 7.5; 7.5 MG/1; MG/1; MG/1; MG/1
30 CAPSULE, EXTENDED RELEASE ORAL EVERY MORNING
Qty: 90 CAPSULE | Refills: 0 | Status: SHIPPED | OUTPATIENT
Start: 2022-04-22 | End: 2022-07-22 | Stop reason: SDUPTHER

## 2022-04-22 NOTE — Clinical Note
"Fadumo Reid" Obey was seen and treated in our emergency department on 4/21/2022.  She may return to school on 04/23/2022.      If you have any questions or concerns, please don't hesitate to call.      chiquita MALIK"
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
## 2022-04-29 ENCOUNTER — TELEPHONE (OUTPATIENT)
Dept: FAMILY MEDICINE | Facility: CLINIC | Age: 47
End: 2022-04-29
Payer: COMMERCIAL

## 2022-04-29 NOTE — TELEPHONE ENCOUNTER
It should not be needed  Is her school requiring it?  Usually, if her titers did not react, she would need a booster. She received the booster and would just need to send that documentation to be given to the school

## 2022-04-29 NOTE — TELEPHONE ENCOUNTER
----- Message from Ping Joaquin sent at 4/29/2022  2:19 PM CDT -----  Regarding: self [phone]  Type: Patient Call Back    Who called: self    What is the request in detail: Needs a call back regarding immunization    Can the clinic reply by [facility]? no    Would the patient rather a call back or a response via [facility]?  Call back    Best call back number:[phone]

## 2022-05-25 ENCOUNTER — PATIENT MESSAGE (OUTPATIENT)
Dept: FAMILY MEDICINE | Facility: CLINIC | Age: 47
End: 2022-05-25
Payer: COMMERCIAL

## 2022-07-22 ENCOUNTER — OFFICE VISIT (OUTPATIENT)
Dept: FAMILY MEDICINE | Facility: CLINIC | Age: 47
End: 2022-07-22
Payer: COMMERCIAL

## 2022-07-22 VITALS
BODY MASS INDEX: 34.11 KG/M2 | HEART RATE: 74 BPM | SYSTOLIC BLOOD PRESSURE: 110 MMHG | DIASTOLIC BLOOD PRESSURE: 86 MMHG | TEMPERATURE: 98 F | HEIGHT: 60 IN | WEIGHT: 173.75 LBS | OXYGEN SATURATION: 99 %

## 2022-07-22 DIAGNOSIS — K21.9 GASTROESOPHAGEAL REFLUX DISEASE WITHOUT ESOPHAGITIS: ICD-10-CM

## 2022-07-22 DIAGNOSIS — Z23 NEED FOR HEPATITIS B VACCINATION: ICD-10-CM

## 2022-07-22 DIAGNOSIS — F90.9 ATTENTION DEFICIT HYPERACTIVITY DISORDER (ADHD), UNSPECIFIED ADHD TYPE: Primary | ICD-10-CM

## 2022-07-22 DIAGNOSIS — Z23 NEED FOR MEASLES-MUMPS-RUBELLA (MMR) VACCINE: ICD-10-CM

## 2022-07-22 PROCEDURE — 90472 IMMUNIZATION ADMIN EACH ADD: CPT | Mod: S$GLB,,, | Performed by: FAMILY MEDICINE

## 2022-07-22 PROCEDURE — 99214 OFFICE O/P EST MOD 30 MIN: CPT | Mod: 25,S$GLB,, | Performed by: FAMILY MEDICINE

## 2022-07-22 PROCEDURE — 90743 HEPATITIS B VACCINE ADOLESCENT 2 DOSE IM: ICD-10-PCS | Mod: S$GLB,,, | Performed by: FAMILY MEDICINE

## 2022-07-22 PROCEDURE — 90472 HEPATITIS B VACCINE ADOLESCENT 2 DOSE IM: ICD-10-PCS | Mod: S$GLB,,, | Performed by: FAMILY MEDICINE

## 2022-07-22 PROCEDURE — 99999 PR PBB SHADOW E&M-EST. PATIENT-LVL III: ICD-10-PCS | Mod: PBBFAC,,, | Performed by: FAMILY MEDICINE

## 2022-07-22 PROCEDURE — 99999 PR PBB SHADOW E&M-EST. PATIENT-LVL III: CPT | Mod: PBBFAC,,, | Performed by: FAMILY MEDICINE

## 2022-07-22 PROCEDURE — 90707 MMR VACCINE SC: CPT | Mod: S$GLB,,, | Performed by: FAMILY MEDICINE

## 2022-07-22 PROCEDURE — 90707 MMR VACCINE SQ: ICD-10-PCS | Mod: S$GLB,,, | Performed by: FAMILY MEDICINE

## 2022-07-22 PROCEDURE — 90471 MMR VACCINE SQ: ICD-10-PCS | Mod: S$GLB,,, | Performed by: FAMILY MEDICINE

## 2022-07-22 PROCEDURE — 90743 HEPB VACC 2 DOSE ADOLESC IM: CPT | Mod: S$GLB,,, | Performed by: FAMILY MEDICINE

## 2022-07-22 PROCEDURE — 90471 IMMUNIZATION ADMIN: CPT | Mod: S$GLB,,, | Performed by: FAMILY MEDICINE

## 2022-07-22 PROCEDURE — 99214 PR OFFICE/OUTPT VISIT, EST, LEVL IV, 30-39 MIN: ICD-10-PCS | Mod: 25,S$GLB,, | Performed by: FAMILY MEDICINE

## 2022-07-22 RX ORDER — DEXTROAMPHETAMINE SACCHARATE, AMPHETAMINE ASPARTATE MONOHYDRATE, DEXTROAMPHETAMINE SULFATE AND AMPHETAMINE SULFATE 7.5; 7.5; 7.5; 7.5 MG/1; MG/1; MG/1; MG/1
30 CAPSULE, EXTENDED RELEASE ORAL EVERY MORNING
Qty: 90 CAPSULE | Refills: 0 | Status: SHIPPED | OUTPATIENT
Start: 2022-07-22 | End: 2023-04-21 | Stop reason: SDUPTHER

## 2022-07-22 RX ORDER — IBUPROFEN 800 MG/1
TABLET ORAL
COMMUNITY
End: 2022-07-22 | Stop reason: SDUPTHER

## 2022-07-22 RX ORDER — PANTOPRAZOLE SODIUM 40 MG/1
TABLET, DELAYED RELEASE ORAL
COMMUNITY
End: 2022-07-22 | Stop reason: SDUPTHER

## 2022-07-22 NOTE — PROGRESS NOTES
Routine Office Visit    Fadumo Barnhart  1975  [id number]      Subjective     Fadumo is a 47 y.o. female who presents today for:      1. ADHD - Patient is on adderall. She feels this may not be a high enough dose. 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 She denies any side effect from medications such as weight loss, hypertension, chest pain, heart palpitations or anxiety      Objective     Review of Systems   Constitutional: Negative for chills and fever.   HENT: Negative for congestion.    Eyes: Negative for blurred vision.   Respiratory: Negative for cough.    Cardiovascular: Negative for chest pain.   Gastrointestinal: Negative for abdominal pain, constipation, diarrhea, heartburn, nausea and vomiting.   Genitourinary: Negative for dysuria.   Musculoskeletal: Negative for myalgias.   Skin: Negative for itching and rash.   Neurological: Negative for dizziness and headaches.   Psychiatric/Behavioral: Negative for depression.       /86 (BP Location: Right arm, Patient Position: Sitting, BP Method: Large (Manual))   Pulse 74   Temp 98.1 °F (36.7 °C) (Oral)   Ht 5' (1.524 m)   Wt 78.8 kg (173 lb 11.6 oz)   LMP 07/11/2022   SpO2 99%   BMI 33.93 kg/m²   Physical Exam  Constitutional:       Appearance: She is well-developed.   HENT:      Head: Normocephalic and atraumatic.   Eyes:      Conjunctiva/sclera: Conjunctivae normal.      Pupils: Pupils are equal, round, and reactive to light.   Cardiovascular:      Rate and Rhythm: Normal rate and regular rhythm.      Heart sounds: Normal heart sounds. No murmur heard.    No friction rub. No gallop.   Pulmonary:      Effort: No respiratory distress.      Breath sounds: Normal breath sounds.   Abdominal:      General: Bowel sounds are normal. There is no distension.      Palpations: Abdomen is soft.      Tenderness: There is no abdominal tenderness.    Musculoskeletal:         General: Normal range of motion.      Cervical back: Normal range of motion and neck supple.   Lymphadenopathy:      Cervical: No cervical adenopathy.   Skin:     General: Skin is warm.   Neurological:      Mental Status: She is alert and oriented to person, place, and time.           Assessment     Problem List Items Addressed This Visit        Psychiatric    ADHD (attention deficit hyperactivity disorder) - Primary    Relevant Medications    dextroamphetamine-amphetamine (ADDERALL XR) 30 MG 24 hr capsule  [place]  The current medical regimen is effective;  continue present plan and medications.         GI    GERD (gastroesophageal reflux disease)  The current medical regimen is effective;  continue present plan and medications.        Other Visit Diagnoses     Need for hepatitis B vaccination        Relevant Orders    (In Office Administered) Hepatitis B Vaccine (Adolescent) (2 Dose) (IM) (Completed)    Need for measles-mumps-rubella (MMR) vaccine        Relevant Orders    (In Office Administered) MMR Vaccine (SQ) (Completed)          No follow-ups on file.

## 2022-07-22 NOTE — PROGRESS NOTES
Patient tolerated MMR & Hep B vaccines well. Patient advised to wait 15 minutes. Gave patient VIS information.

## 2022-10-06 NOTE — PROGRESS NOTES
Patient received hepatitis B vaccine/heplisavB, tolerated well 15 minute wait advised.     Klisyri Pregnancy And Lactation Text: It is unknown if this medication can harm a developing fetus or if it is excreted in breast milk.

## 2022-11-21 ENCOUNTER — PATIENT MESSAGE (OUTPATIENT)
Dept: ADMINISTRATIVE | Facility: HOSPITAL | Age: 47
End: 2022-11-21
Payer: COMMERCIAL

## 2022-12-15 ENCOUNTER — HOSPITAL ENCOUNTER (OUTPATIENT)
Dept: RADIOLOGY | Facility: HOSPITAL | Age: 47
Discharge: HOME | End: 2022-12-15
Attending: STUDENT IN AN ORGANIZED HEALTH CARE EDUCATION/TRAINING PROGRAM
Payer: COMMERCIAL

## 2022-12-15 ENCOUNTER — OFFICE VISIT (OUTPATIENT)
Dept: FAMILY MEDICINE | Facility: CLINIC | Age: 47
End: 2022-12-15
Payer: COMMERCIAL

## 2022-12-15 VITALS
DIASTOLIC BLOOD PRESSURE: 80 MMHG | SYSTOLIC BLOOD PRESSURE: 130 MMHG | BODY MASS INDEX: 32.44 KG/M2 | WEIGHT: 166.13 LBS | OXYGEN SATURATION: 93 % | TEMPERATURE: 98 F | HEART RATE: 89 BPM

## 2022-12-15 DIAGNOSIS — Z90.49 HISTORY OF BOWEL RESECTION: ICD-10-CM

## 2022-12-15 DIAGNOSIS — Z00.00 ROUTINE HEALTH MAINTENANCE: ICD-10-CM

## 2022-12-15 DIAGNOSIS — Z12.39 ENCOUNTER FOR SCREENING FOR MALIGNANT NEOPLASM OF BREAST, UNSPECIFIED SCREENING MODALITY: ICD-10-CM

## 2022-12-15 DIAGNOSIS — Z12.11 COLON CANCER SCREENING: ICD-10-CM

## 2022-12-15 DIAGNOSIS — J04.0 LARYNGITIS, ACUTE: ICD-10-CM

## 2022-12-15 DIAGNOSIS — R06.83 SNORING: ICD-10-CM

## 2022-12-15 DIAGNOSIS — G47.00 INSOMNIA, UNSPECIFIED TYPE: ICD-10-CM

## 2022-12-15 DIAGNOSIS — Z23 INFLUENZA VACCINE NEEDED: Primary | ICD-10-CM

## 2022-12-15 PROBLEM — Z98.890 STATUS POST EMBOLIZATION OF UTERINE ARTERY: Status: ACTIVE | Noted: 2018-12-17

## 2022-12-15 PROBLEM — M22.2X2 PATELLOFEMORAL PAIN SYNDROME OF LEFT KNEE: Status: ACTIVE | Noted: 2022-12-15

## 2022-12-15 PROBLEM — D25.1 FIBROIDS, INTRAMURAL: Status: ACTIVE | Noted: 2022-12-15

## 2022-12-15 PROCEDURE — 90686 FLU VACCINE (QUAD) GREATER THAN OR EQUAL TO 3YO PRESERVATIVE FREE IM: ICD-10-PCS | Mod: S$GLB,,, | Performed by: STUDENT IN AN ORGANIZED HEALTH CARE EDUCATION/TRAINING PROGRAM

## 2022-12-15 PROCEDURE — 77067 MAMMO DIGITAL SCREENING BILAT WITH TOMO: ICD-10-PCS | Mod: 26,,, | Performed by: RADIOLOGY

## 2022-12-15 PROCEDURE — 77063 MAMMO DIGITAL SCREENING BILAT WITH TOMO: ICD-10-PCS | Mod: 26,,, | Performed by: RADIOLOGY

## 2022-12-15 PROCEDURE — 77063 BREAST TOMOSYNTHESIS BI: CPT | Mod: TC,PO

## 2022-12-15 PROCEDURE — 90471 IMMUNIZATION ADMIN: CPT | Mod: S$GLB,,, | Performed by: STUDENT IN AN ORGANIZED HEALTH CARE EDUCATION/TRAINING PROGRAM

## 2022-12-15 PROCEDURE — 99999 PR PBB SHADOW E&M-EST. PATIENT-LVL IV: CPT | Mod: PBBFAC,,, | Performed by: STUDENT IN AN ORGANIZED HEALTH CARE EDUCATION/TRAINING PROGRAM

## 2022-12-15 PROCEDURE — 90686 IIV4 VACC NO PRSV 0.5 ML IM: CPT | Mod: S$GLB,,, | Performed by: STUDENT IN AN ORGANIZED HEALTH CARE EDUCATION/TRAINING PROGRAM

## 2022-12-15 PROCEDURE — 90471 FLU VACCINE (QUAD) GREATER THAN OR EQUAL TO 3YO PRESERVATIVE FREE IM: ICD-10-PCS | Mod: S$GLB,,, | Performed by: STUDENT IN AN ORGANIZED HEALTH CARE EDUCATION/TRAINING PROGRAM

## 2022-12-15 PROCEDURE — 77067 SCR MAMMO BI INCL CAD: CPT | Mod: TC,PO

## 2022-12-15 PROCEDURE — 99999 PR PBB SHADOW E&M-EST. PATIENT-LVL IV: ICD-10-PCS | Mod: PBBFAC,,, | Performed by: STUDENT IN AN ORGANIZED HEALTH CARE EDUCATION/TRAINING PROGRAM

## 2022-12-15 PROCEDURE — 77067 SCR MAMMO BI INCL CAD: CPT | Mod: 26,,, | Performed by: RADIOLOGY

## 2022-12-15 PROCEDURE — 99214 PR OFFICE/OUTPT VISIT, EST, LEVL IV, 30-39 MIN: ICD-10-PCS | Mod: 25,S$GLB,, | Performed by: STUDENT IN AN ORGANIZED HEALTH CARE EDUCATION/TRAINING PROGRAM

## 2022-12-15 PROCEDURE — 77063 BREAST TOMOSYNTHESIS BI: CPT | Mod: 26,,, | Performed by: RADIOLOGY

## 2022-12-15 PROCEDURE — 99214 OFFICE O/P EST MOD 30 MIN: CPT | Mod: 25,S$GLB,, | Performed by: STUDENT IN AN ORGANIZED HEALTH CARE EDUCATION/TRAINING PROGRAM

## 2022-12-15 RX ORDER — METHYLPREDNISOLONE 4 MG/1
TABLET ORAL
Qty: 21 EACH | Refills: 0 | Status: SHIPPED | OUTPATIENT
Start: 2022-12-15 | End: 2023-01-05

## 2022-12-15 RX ORDER — ZOLPIDEM TARTRATE 5 MG/1
5 TABLET ORAL NIGHTLY PRN
Qty: 15 TABLET | Refills: 0 | Status: SHIPPED | OUTPATIENT
Start: 2022-12-15 | End: 2023-06-15

## 2022-12-15 NOTE — PROGRESS NOTES
12/15/2022    Fadumo Barnhart  [id number]    Chief Complaint   Patient presents with    Follow-up    Anxiety       HPI    Ms. 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; also very busy with childrn  Will be starting therapy    Adhd, Adult  On adderall XR 30 mg dosing is fine      Negative 10 point ROS outside of HPI    Social History     Socioeconomic History    Marital status:    Occupational History     Employer: [employer]   Tobacco Use    Smoking status: Never    Smokeless tobacco: Never   Substance and Sexual Activity    Alcohol use: Yes     Comment: OCCASIONAL    Drug use: No    Sexual activity: Yes     Partners: Male           Current Outpatient Medications:     dextroamphetamine-amphetamine (ADDERALL XR) 30 MG 24 hr capsule, Take 1 capsule (30 mg total) by mouth every morning., Disp: 90 capsule, Rfl: 0    ibuprofen (ADVIL,MOTRIN) 800 MG tablet, TAKE 1 TABLET EVERY 6 HOURS AS NEEDED FOR PAIN, Disp: 90 tablet, Rfl: 14    multivitamin capsule, Take 1 capsule by mouth once daily., Disp: , Rfl:     pantoprazole (PROTONIX) 40 MG tablet, Take 1 tablet (40 mg total) by mouth once daily., Disp: 90 tablet, Rfl: 2      Physical Exam  Vitals:    12/15/22 0750   BP: 130/80   Pulse: 89   Temp: 97.9 °F (36.6 °C)       Gen: well appearing, NAD  Resp: non labored breathing, no crackles, no wheezes, CTAB  CV: RRR no murmur, gallops, rubs, no LE edema  Abd: soft nontender BS present no organomegaly      1. Routine health maintenance  - TSH; Future  - Comprehensive Metabolic Panel; Future  - CBC Auto Differential; Future  - Hemoglobin A1C; Future  - Lipid Panel; Future    2.  Insomnia, unspecified type  - Home Sleep Study; Future  - zolpidem (AMBIEN) 5 MG Tab; Take 1 tablet (5 mg total) by mouth nightly as needed (insomnia).  Dispense: 15 tablet; Refill: 0    3. Snoring  - Home Sleep Study; Future    4. Laryngitis, acute  - methylPREDNISolone (MEDROL DOSEPACK) 4 mg tablet; use as directed  Dispense: 21 each; Refill: 0  Recommended vocal rest    5. Colon cancer screening  - Ambulatory referral/consult to Endo Procedure ; Future    6. Encounter for screening for malignant neoplasm of breast, unspecified screening modality  - Mammo Digital Screening Bilat; Future    7. History of bowel resection  - Ambulatory referral/consult to Gastroenterology; Future  Pt describes SBO    RTC for routine care    Diana Boykin MD  Family Medicine

## 2022-12-19 ENCOUNTER — LAB VISIT (OUTPATIENT)
Dept: LAB | Facility: HOSPITAL | Age: 47
End: 2022-12-19
Attending: STUDENT IN AN ORGANIZED HEALTH CARE EDUCATION/TRAINING PROGRAM
Payer: COMMERCIAL

## 2022-12-19 DIAGNOSIS — Z00.00 ROUTINE HEALTH MAINTENANCE: ICD-10-CM

## 2022-12-19 LAB
ALBUMIN SERPL BCP-MCNC: 4 G/DL (ref 3.5–5.2)
ALP SERPL-CCNC: 71 U/L (ref 55–135)
ALT SERPL W/O P-5'-P-CCNC: 16 U/L (ref 10–44)
ANION GAP SERPL CALC-SCNC: 12 MMOL/L (ref 8–16)
AST SERPL-CCNC: 16 U/L (ref 10–40)
BASOPHILS # BLD AUTO: 0.02 K/UL (ref 0–0.2)
BASOPHILS NFR BLD: 0.4 % (ref 0–1.9)
BILIRUB SERPL-MCNC: 1.1 MG/DL (ref 0.1–1)
BUN SERPL-MCNC: 11 MG/DL (ref 6–20)
CALCIUM SERPL-MCNC: 9.6 MG/DL (ref 8.7–10.5)
CHLORIDE SERPL-SCNC: 104 MMOL/L (ref 95–110)
CHOLEST SERPL-MCNC: 151 MG/DL (ref 120–199)
CHOLEST/HDLC SERPL: 1.6 {RATIO} (ref 2–5)
CO2 SERPL-SCNC: 25 MMOL/L (ref 23–29)
CREAT SERPL-MCNC: 0.9 MG/DL (ref 0.5–1.4)
DIFFERENTIAL METHOD: ABNORMAL
EOSINOPHIL # BLD AUTO: 0.1 K/UL (ref 0–0.5)
EOSINOPHIL NFR BLD: 1.2 % (ref 0–8)
ERYTHROCYTE [DISTWIDTH] IN BLOOD BY AUTOMATED COUNT: 13.6 % (ref 11.5–14.5)
EST. GFR  (NO RACE VARIABLE): >60 ML/MIN/1.73 M^2
ESTIMATED AVG GLUCOSE: 105 MG/DL (ref 68–131)
GLUCOSE SERPL-MCNC: 93 MG/DL (ref 70–110)
HBA1C MFR BLD: 5.3 % (ref 4–5.6)
HCT VFR BLD AUTO: 39.2 % (ref 37–48.5)
HDLC SERPL-MCNC: 97 MG/DL (ref 40–75)
HDLC SERPL: 64.2 % (ref 20–50)
HGB BLD-MCNC: 12.6 G/DL (ref 12–16)
IMM GRANULOCYTES # BLD AUTO: 0.01 K/UL (ref 0–0.04)
IMM GRANULOCYTES NFR BLD AUTO: 0.2 % (ref 0–0.5)
LDLC SERPL CALC-MCNC: 43.6 MG/DL (ref 63–159)
LYMPHOCYTES # BLD AUTO: 3 K/UL (ref 1–4.8)
LYMPHOCYTES NFR BLD: 60.8 % (ref 18–48)
MCH RBC QN AUTO: 30 PG (ref 27–31)
MCHC RBC AUTO-ENTMCNC: 32.1 G/DL (ref 32–36)
MCV RBC AUTO: 93 FL (ref 82–98)
MONOCYTES # BLD AUTO: 0.5 K/UL (ref 0.3–1)
MONOCYTES NFR BLD: 10.2 % (ref 4–15)
NEUTROPHILS # BLD AUTO: 1.4 K/UL (ref 1.8–7.7)
NEUTROPHILS NFR BLD: 27.2 % (ref 38–73)
NONHDLC SERPL-MCNC: 54 MG/DL
NRBC BLD-RTO: 0 /100 WBC
PLATELET # BLD AUTO: 235 K/UL (ref 150–450)
PMV BLD AUTO: 11.1 FL (ref 9.2–12.9)
POTASSIUM SERPL-SCNC: 3.7 MMOL/L (ref 3.5–5.1)
PROT SERPL-MCNC: 7.3 G/DL (ref 6–8.4)
RBC # BLD AUTO: 4.2 M/UL (ref 4–5.4)
SODIUM SERPL-SCNC: 141 MMOL/L (ref 136–145)
TRIGL SERPL-MCNC: 52 MG/DL (ref 30–150)
TSH SERPL DL<=0.005 MIU/L-ACNC: 1.5 UIU/ML (ref 0.4–4)
WBC # BLD AUTO: 4.98 K/UL (ref 3.9–12.7)

## 2022-12-19 PROCEDURE — 80061 LIPID PANEL: CPT | Performed by: STUDENT IN AN ORGANIZED HEALTH CARE EDUCATION/TRAINING PROGRAM

## 2022-12-19 PROCEDURE — 80053 COMPREHEN METABOLIC PANEL: CPT | Performed by: STUDENT IN AN ORGANIZED HEALTH CARE EDUCATION/TRAINING PROGRAM

## 2022-12-19 PROCEDURE — 36415 COLL VENOUS BLD VENIPUNCTURE: CPT | Mod: PO | Performed by: STUDENT IN AN ORGANIZED HEALTH CARE EDUCATION/TRAINING PROGRAM

## 2022-12-19 PROCEDURE — 85025 COMPLETE CBC W/AUTO DIFF WBC: CPT | Performed by: STUDENT IN AN ORGANIZED HEALTH CARE EDUCATION/TRAINING PROGRAM

## 2022-12-19 PROCEDURE — 84443 ASSAY THYROID STIM HORMONE: CPT | Performed by: STUDENT IN AN ORGANIZED HEALTH CARE EDUCATION/TRAINING PROGRAM

## 2022-12-19 PROCEDURE — 83036 HEMOGLOBIN GLYCOSYLATED A1C: CPT | Performed by: STUDENT IN AN ORGANIZED HEALTH CARE EDUCATION/TRAINING PROGRAM

## 2023-01-03 ENCOUNTER — TELEPHONE (OUTPATIENT)
Dept: PULMONOLOGY | Facility: CLINIC | Age: 48
End: 2023-01-03
Payer: COMMERCIAL

## 2023-01-13 ENCOUNTER — HOSPITAL ENCOUNTER (EMERGENCY)
Facility: HOSPITAL | Age: 48
Discharge: HOME | End: 2023-01-13
Attending: STUDENT IN AN ORGANIZED HEALTH CARE EDUCATION/TRAINING PROGRAM
Payer: COMMERCIAL

## 2023-01-13 ENCOUNTER — NURSE TRIAGE (OUTPATIENT)
Dept: ADMINISTRATIVE | Facility: CLINIC | Age: 48
End: 2023-01-13
Payer: COMMERCIAL

## 2023-01-13 VITALS
HEART RATE: 84 BPM | BODY MASS INDEX: 27.49 KG/M2 | SYSTOLIC BLOOD PRESSURE: 135 MMHG | OXYGEN SATURATION: 97 % | WEIGHT: 165 LBS | HEIGHT: 65 IN | DIASTOLIC BLOOD PRESSURE: 85 MMHG | TEMPERATURE: 98 F | RESPIRATION RATE: 20 BRPM

## 2023-01-13 DIAGNOSIS — E16.2 HYPOGLYCEMIA: Primary | ICD-10-CM

## 2023-01-13 DIAGNOSIS — R42 DIZZY: ICD-10-CM

## 2023-01-13 DIAGNOSIS — R00.0 INCREASED HEART RATE: ICD-10-CM

## 2023-01-13 LAB
ALBUMIN SERPL-MCNC: 3.8 G/DL (ref 3.3–5.5)
ALBUMIN SERPL-MCNC: 3.9 G/DL (ref 3.3–5.5)
ALP SERPL-CCNC: 81 U/L (ref 42–141)
ALP SERPL-CCNC: 88 U/L (ref 42–141)
B-HCG UR QL: NEGATIVE
BILIRUB SERPL-MCNC: 0.7 MG/DL (ref 0.2–1.6)
BILIRUB SERPL-MCNC: 0.7 MG/DL (ref 0.2–1.6)
BILIRUBIN, POC UA: NEGATIVE
BLOOD, POC UA: NEGATIVE
BUN SERPL-MCNC: 11 MG/DL (ref 7–22)
CALCIUM SERPL-MCNC: 9.7 MG/DL (ref 8–10.3)
CHLORIDE SERPL-SCNC: 108 MMOL/L (ref 98–108)
CLARITY, POC UA: CLEAR
COLOR, POC UA: YELLOW
CREAT SERPL-MCNC: 1.1 MG/DL (ref 0.6–1.2)
CTP QC/QA: YES
GLUCOSE SERPL-MCNC: 42 MG/DL (ref 73–118)
GLUCOSE, POC UA: NEGATIVE
KETONES, POC UA: NEGATIVE
LEUKOCYTE EST, POC UA: NEGATIVE
NITRITE, POC UA: NEGATIVE
PH UR STRIP: 5.5 [PH]
POC ALT (SGPT): 21 U/L (ref 10–47)
POC ALT (SGPT): 24 U/L (ref 10–47)
POC AMYLASE: 46 U/L (ref 14–97)
POC AST (SGOT): 27 U/L (ref 11–38)
POC AST (SGOT): 28 U/L (ref 11–38)
POC GGT: 18 U/L (ref 5–65)
POC TCO2: 28 MMOL/L (ref 18–33)
POCT GLUCOSE: 100 MG/DL (ref 70–110)
POCT GLUCOSE: 108 MG/DL (ref 70–110)
POCT GLUCOSE: 58 MG/DL (ref 70–110)
POCT GLUCOSE: 90 MG/DL (ref 70–110)
POTASSIUM BLD-SCNC: 3.7 MMOL/L (ref 3.6–5.1)
PROTEIN, POC UA: NEGATIVE
PROTEIN, POC: 8.2 G/DL (ref 6.4–8.1)
PROTEIN, POC: 8.2 G/DL (ref 6.4–8.1)
SODIUM BLD-SCNC: 144 MMOL/L (ref 128–145)
SPECIFIC GRAVITY, POC UA: 1.02
UROBILINOGEN, POC UA: 0.2 E.U./DL

## 2023-01-13 PROCEDURE — 93010 EKG 12-LEAD: ICD-10-PCS | Mod: ,,, | Performed by: INTERNAL MEDICINE

## 2023-01-13 PROCEDURE — 80053 COMPREHEN METABOLIC PANEL: CPT | Mod: ER

## 2023-01-13 PROCEDURE — 99284 EMERGENCY DEPT VISIT MOD MDM: CPT | Mod: 25,ER

## 2023-01-13 PROCEDURE — 82150 ASSAY OF AMYLASE: CPT | Mod: ER

## 2023-01-13 PROCEDURE — 81025 URINE PREGNANCY TEST: CPT | Mod: ER | Performed by: STUDENT IN AN ORGANIZED HEALTH CARE EDUCATION/TRAINING PROGRAM

## 2023-01-13 PROCEDURE — 82962 GLUCOSE BLOOD TEST: CPT | Mod: ER

## 2023-01-13 PROCEDURE — 81003 URINALYSIS AUTO W/O SCOPE: CPT | Mod: ER

## 2023-01-13 PROCEDURE — 93010 ELECTROCARDIOGRAM REPORT: CPT | Mod: ,,, | Performed by: INTERNAL MEDICINE

## 2023-01-13 PROCEDURE — 93005 ELECTROCARDIOGRAM TRACING: CPT | Mod: ER

## 2023-01-13 PROCEDURE — 85025 COMPLETE CBC W/AUTO DIFF WBC: CPT | Mod: ER

## 2023-01-13 NOTE — TELEPHONE ENCOUNTER
Pt co Elevated HR and headache with dizziness denies chest pain or sob states she has had symptoms on and off over 2-3 days . Pt reports a lot going on in personal life. Pt advised to get someone to drive her to ED now per care advice disposition.  Reason for Disposition   Dizziness, lightheadedness, or weakness    Additional Information   Negative: Passed out (i.e., lost consciousness, collapsed and was not responding)   Negative: Shock suspected (e.g., cold/pale/clammy skin, too weak to stand, low BP, rapid pulse)   Negative: Difficult to awaken or acting confused (e.g., disoriented, slurred speech)   Negative: Visible sweat on face or sweat dripping down face   Negative: Unable to walk, or can only walk with assistance (e.g., requires support)   Negative: Received SHOCK from implantable cardiac defibrillator and has persisting symptoms (i.e., palpitations, lightheadedness)   Negative: Dizziness, lightheadedness, or weakness and heart beating very rapidly (e.g., > 140 / minute)   Negative: Dizziness, lightheadedness, or weakness and heart beating very slowly (e.g., < 50 / minute)   Negative: Sounds like a life-threatening emergency to the triager   Negative: Difficulty breathing    Protocols used: Heart Rate and Heartbeat [id number]

## 2023-01-13 NOTE — DISCHARGE INSTRUCTIONS
Thank you for coming to our Emergency Department today. It is important to remember that some problems or medical conditions are difficult to diagnose and may not be found during your Emergency Department visit.     Be sure to follow up with your primary care doctor and review all labs/imaging/tests that were performed during your ER visit with them. Some labs/tests may be outside of the normal range and require non-emergent follow-up and further investigation to help diagnose/exclude/prevent complications or other potentially serious medical conditions that were not addressed during your ER visit.    If you do not have a primary care doctor, you may contact the one listed on your discharge paperwork or you may also call the [facility] Appointment Desk at [phone] to schedule an appointment and establish care with one. It is important to your health that you have a primary care doctor.    Please take all medications as directed. All medications may potentially have side-effects and it is impossible to predict which medications may give you side-effects or what side-effects (if any) they will give you.. If you feel that you are having a negative effect or side-effect of any medication you should immediately stop taking them and seek medical attention. If you feel that you are having a life-threatening reaction call 911.    Return to the ER with any questions/concerns, new/concerning symptoms, worsening or failure to improve.     Do not drive, swim, climb to height, take a bath, operate heavy machinery, drink alcohol or take potentially sedating medications, sign any legal documents or make any important decisions for 24 hours if you have received any pain medications, sedatives or mood altering drugs during your ER visit or within 24 hours of taking them if they have been prescribed to you.     You can find additional resources for Dentists, hearing aids, durable medical equipment, low cost pharmacies and  other resources at https://[facility].org    BELOW THIS LINE ONLY APPLIES IF YOU HAVE A COVID TEST PENDING OR IF YOU HAVE BEEN DIAGNOSED WITH COVID:  Please access [facility] to review the results of your test. Until the results of your COVID test return, you should isolate yourself so as not to potentially spread illness to others.   If your COVID test returns positive, you should isolate yourself so as not to spread illness to others. After five full days, if you are feeling better and you have not had fever for 24 hours, you can return to your typical daily activities, but you must wear a mask around others for an additional 5 days.   If your COVID test returns negative and you are either unvaccinated or more than six months out from your two-dose vaccine and are not yet boosted, you should still quarantine for 5 full days followed by strict mask use for an additional 5 full days.   If your COVID test returns negative and you have received your 2-dose initial vaccine as well as a booster, you should continue strict mask use for 10 full days after the exposure.  For all those exposed, best practice includes a test at day 5 after the exposure. This can be a home test or a test through one of the many testing centers throughout our community.   Masking is always advised to limit the spread of COVID. Cdc.gov is an excellent site to obtain the latest up to date recommendations regarding COVID and COVID testing.     CDC Testing and Quarantine Guidelines for patients with exposure to a known-positive COVID-19 person:  A close exposure is defined as anyone who has had an exposure (masked or unmasked) to a known COVID -19 positive person within 6 feet of someone for a cumulative total of 15 minutes or more over a 24-hour period.   Vaccinated and/or if you recently had a positive covid test within 90 days do NOT need to quarantine after contact with someone who had COVID-19 unless you develop symptoms.   Fully vaccinated  people who have not had a positive test within 90 days, should get tested 3-5 days after their exposure, even if they don't have symptoms and wear a mask indoors in public for 14 days following exposure or until their test result is negative.      Unvaccinated and/or NOT had a positive test within 90 days and meet close exposure  You are required by CDC guidelines to quarantine for at least 5 days from time of exposure followed by 5 days of strict masking. It is recommended, but not required to test after 5 days, unless you develop symptoms, in which case you should test at that time.  If you get tested after 5 days and your test is positive, your 5 day period of isolation starts the day of the positive test.    If your exposure does not meet the above definition, you can return to your normal daily activities to include social distancing, wearing a mask and frequent handwashing.      Here is a link to guidance from the CDC:  https://www.cdc.gov/media/releases/2021/s1227-isolation-quarantine-guidance.html      [place] Dept Of Health Testing Sites:  https://ldh.la.gov/page/3934      [facility] website with testing locations and guidance:  https://[email or web address]

## 2023-01-13 NOTE — ED NOTES
PT STATES THAT SHE BEGIN TO FEEL DIZZY AND WEAK PTA. STATES HAS A HX OF HYPOGLYCEMIA. STATES IS UNDER A LOT OF STRESS AT THIS TIME.

## 2023-01-13 NOTE — TELEPHONE ENCOUNTER
Patient needs to go to the ED if having palpitations, dizziness, or weakness.  If your pulse is not going down by the time you get in touch with her she should go to the ED if this is not her normal    Thanks  Dr. Neal

## 2023-01-13 NOTE — ED NOTES
PT STATES THAT SHE URINATED PTA.  SPECIMEN CUP IS AT BEDSIDE. PT STATES THAT SHE IS STARTING TO FEEL BETTER

## 2023-01-24 ENCOUNTER — OFFICE VISIT (OUTPATIENT)
Dept: CARDIOLOGY | Facility: CLINIC | Age: 48
End: 2023-01-24
Payer: COMMERCIAL

## 2023-01-24 VITALS
HEART RATE: 87 BPM | HEIGHT: 65 IN | RESPIRATION RATE: 15 BRPM | DIASTOLIC BLOOD PRESSURE: 62 MMHG | SYSTOLIC BLOOD PRESSURE: 126 MMHG | OXYGEN SATURATION: 100 % | WEIGHT: 163.13 LBS | BODY MASS INDEX: 27.18 KG/M2

## 2023-01-24 DIAGNOSIS — R03.0 ELEVATED BLOOD PRESSURE READING WITHOUT DIAGNOSIS OF HYPERTENSION: ICD-10-CM

## 2023-01-24 DIAGNOSIS — Z82.49 FAMILY HISTORY OF CORONARY ARTERY DISEASE: ICD-10-CM

## 2023-01-24 DIAGNOSIS — K21.9 GASTROESOPHAGEAL REFLUX DISEASE, UNSPECIFIED WHETHER ESOPHAGITIS PRESENT: ICD-10-CM

## 2023-01-24 DIAGNOSIS — R00.0 TACHYCARDIA: ICD-10-CM

## 2023-01-24 DIAGNOSIS — F90.8 ATTENTION DEFICIT HYPERACTIVITY DISORDER (ADHD), OTHER TYPE: ICD-10-CM

## 2023-01-24 DIAGNOSIS — R00.0 INCREASED HEART RATE: Primary | ICD-10-CM

## 2023-01-24 DIAGNOSIS — Z79.899 HIGH RISK MEDICATION USE: ICD-10-CM

## 2023-01-24 PROCEDURE — 99999 PR PBB SHADOW E&M-EST. PATIENT-LVL IV: CPT | Mod: PBBFAC,,, | Performed by: INTERNAL MEDICINE

## 2023-01-24 PROCEDURE — 99204 PR OFFICE/OUTPT VISIT, NEW, LEVL IV, 45-59 MIN: ICD-10-PCS | Mod: S$GLB,,, | Performed by: INTERNAL MEDICINE

## 2023-01-24 PROCEDURE — 99204 OFFICE O/P NEW MOD 45 MIN: CPT | Mod: S$GLB,,, | Performed by: INTERNAL MEDICINE

## 2023-01-24 PROCEDURE — 99999 PR PBB SHADOW E&M-EST. PATIENT-LVL IV: ICD-10-PCS | Mod: PBBFAC,,, | Performed by: INTERNAL MEDICINE
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
## 2023-01-25 ENCOUNTER — PATIENT MESSAGE (OUTPATIENT)
Dept: ADMINISTRATIVE | Facility: HOSPITAL | Age: 48
End: 2023-01-25
Payer: COMMERCIAL

## 2023-01-25 ENCOUNTER — TELEPHONE (OUTPATIENT)
Dept: FAMILY MEDICINE | Facility: CLINIC | Age: 48
End: 2023-01-25
Payer: COMMERCIAL

## 2023-01-25 NOTE — TELEPHONE ENCOUNTER
----- Message from Britney Trejo sent at 1/25/2023  4:00 PM CST -----  Regarding: Rx consult  Contact: FADUMO QUEEN [[id number]]  Name of Who is Calling: Mrs. Fadumo Queen            What is the request in detail: Pt states she is having sinus  issues and also right side of her throat has been bothering her. She states she has been taking over the counter rx but it isn't working. She states she is requesting some medication in regards. Please advisee       [id number] - [place], [place] - [address] AT [facility]     Can the clinic reply by [facility]: No          What Number to Call Back if not [facility]: Home Phone      [phone]  Work Phone      [phone]  Mobile          [phone]

## 2023-01-25 NOTE — TELEPHONE ENCOUNTER
----- Message from Britney Trejo sent at 1/25/2023  4:00 PM CST -----  Regarding: Rx consult  Contact: FADUMO QUEEN [[id number]]  Name of Who is Calling: Mrs. Fadumo Queen            What is the request in detail: Pt states she is having sinus  issues and also right side of her throat has been bothering her. She states she has been taking over the counter rx but it isn't working. She states she is requesting some medication in regards. Please advisee       [id number] - [place], [place] - [address] AT [facility]     Can the clinic reply by [facility]: No          What Number to Call Back if not in [facility]: Home Phone      [phone]  Work Phone      [phone]  Mobile          [phone]

## 2023-01-26 ENCOUNTER — E-VISIT (OUTPATIENT)
Dept: FAMILY MEDICINE | Facility: CLINIC | Age: 48
End: 2023-01-26
Payer: COMMERCIAL

## 2023-01-26 DIAGNOSIS — J06.9 UPPER RESPIRATORY TRACT INFECTION, UNSPECIFIED TYPE: Primary | ICD-10-CM

## 2023-01-26 PROCEDURE — 99421 PR E&M, ONLINE DIGIT, EST, < 7 DAYS, 5-10 MINS: ICD-10-PCS | Mod: S$GLB,,, | Performed by: FAMILY MEDICINE

## 2023-01-26 PROCEDURE — 99421 OL DIG E/M SVC 5-10 MIN: CPT | Mod: S$GLB,,, | Performed by: FAMILY MEDICINE

## 2023-01-26 RX ORDER — METHYLPREDNISOLONE 4 MG/1
TABLET ORAL
Qty: 21 EACH | Refills: 0 | Status: SHIPPED | OUTPATIENT
Start: 2023-01-26 | End: 2023-02-16

## 2023-01-26 RX ORDER — PROMETHAZINE HYDROCHLORIDE AND DEXTROMETHORPHAN HYDROBROMIDE 6.25; 15 MG/5ML; MG/5ML
5 SYRUP ORAL EVERY 4 HOURS PRN
Qty: 180 ML | Refills: 0 | Status: SHIPPED | OUTPATIENT
Start: 2023-01-26 | End: 2023-02-05

## 2023-02-06 ENCOUNTER — HOSPITAL ENCOUNTER (OUTPATIENT)
Dept: CARDIOLOGY | Facility: HOSPITAL | Age: 48
Discharge: HOME | End: 2023-02-06
Attending: INTERNAL MEDICINE
Payer: COMMERCIAL

## 2023-02-06 DIAGNOSIS — R00.0 TACHYCARDIA: ICD-10-CM

## 2023-02-06 DIAGNOSIS — Z79.899 HIGH RISK MEDICATION USE: ICD-10-CM

## 2023-02-06 DIAGNOSIS — R03.0 ELEVATED BLOOD PRESSURE READING WITHOUT DIAGNOSIS OF HYPERTENSION: ICD-10-CM

## 2023-02-06 DIAGNOSIS — F90.8 ATTENTION DEFICIT HYPERACTIVITY DISORDER (ADHD), OTHER TYPE: ICD-10-CM

## 2023-02-06 DIAGNOSIS — R00.0 INCREASED HEART RATE: ICD-10-CM

## 2023-02-06 LAB
ASCENDING AORTA: 3.05 CM
AV PEAK GRADIENT: 6 MMHG
AV VELOCITY RATIO: 0.84
CV ECHO LV RWT: 0.42 CM
CV STRESS BASE HR: 81 BPM
DIASTOLIC BLOOD PRESSURE: 86 MMHG
DOP CALC AO PEAK VEL: 1.25 M/S
DOP CALC LVOT AREA: 3.7 CM2
DOP CALC LVOT DIAMETER: 2.18 CM
DOP CALC LVOT PEAK VEL: 1.05 M/S
DOP CALC LVOT STROKE VOLUME: 80.21 CM3
DOP CALCLVOT PEAK VEL VTI: 21.5 CM
E WAVE DECELERATION TIME: 170.16 MSEC
E/A RATIO: 2.29
E/E' RATIO: 8.73 M/S
ECHO LV POSTERIOR WALL: 0.93 CM (ref 0.6–1.1)
EJECTION FRACTION: 60 %
FRACTIONAL SHORTENING: 35 % (ref 28–44)
INTERVENTRICULAR SEPTUM: 0.85 CM (ref 0.6–1.1)
IVC DIAMETER: 1.71 CM
IVRT: 147.64 MSEC
LA MAJOR: 5.51 CM
LA MINOR: 4.88 CM
LA WIDTH: 4.2 CM
LEFT ATRIUM SIZE: 3.65 CM
LEFT ATRIUM VOLUME: 67.44 CM3
LEFT INTERNAL DIMENSION IN SYSTOLE: 2.88 CM (ref 2.1–4)
LEFT VENTRICLE DIASTOLIC VOLUME: 90.11 ML
LEFT VENTRICLE SYSTOLIC VOLUME: 31.54 ML
LEFT VENTRICULAR INTERNAL DIMENSION IN DIASTOLE: 4.45 CM (ref 3.5–6)
LEFT VENTRICULAR MASS: 128.46 G
LV LATERAL E/E' RATIO: 7.38 M/S
LV SEPTAL E/E' RATIO: 10.67 M/S
LVOT MG: 2.23 MMHG
LVOT MV: 0.69 CM/S
MV PEAK A VEL: 0.42 M/S
MV PEAK E VEL: 0.96 M/S
OHS CV CPX 1 MINUTE RECOVERY HEART RATE: 125 BPM
OHS CV CPX 85 PERCENT MAX PREDICTED HEART RATE MALE: 141
OHS CV CPX ESTIMATED METS: 10
OHS CV CPX MAX PREDICTED HEART RATE: 166
OHS CV CPX PATIENT IS FEMALE: 1
OHS CV CPX PATIENT IS MALE: 0
OHS CV CPX PEAK DIASTOLIC BLOOD PRESSURE: 85 MMHG
OHS CV CPX PEAK HEAR RATE: 171 BPM
OHS CV CPX PEAK RATE PRESSURE PRODUCT: NORMAL
OHS CV CPX PEAK SYSTOLIC BLOOD PRESSURE: 198 MMHG
OHS CV CPX PERCENT MAX PREDICTED HEART RATE ACHIEVED: 103
OHS CV CPX RATE PRESSURE PRODUCT PRESENTING: NORMAL
PISA TR MAX VEL: 2.23 M/S
PULM VEIN S/D RATIO: 1.82
PV PEAK D VEL: 0.34 M/S
PV PEAK S VEL: 0.62 M/S
PV PEAK VELOCITY: 1.29 CM/S
RA MAJOR: 4.76 CM
RA PRESSURE: 3 MMHG
RA WIDTH: 3.8 CM
RIGHT VENTRICULAR END-DIASTOLIC DIMENSION: 4.25 CM
SINUS: 2.6 CM
STJ: 2.23 CM
STRESS ANGINA INDEX: 0
STRESS ECHO POST EXERCISE DUR MIN: 8 MINUTES
STRESS ECHO POST EXERCISE DUR SEC: 54 SECONDS
SYSTOLIC BLOOD PRESSURE: 136 MMHG
TDI LATERAL: 0.13 M/S
TDI SEPTAL: 0.09 M/S
TDI: 0.11 M/S
TR MAX PG: 20 MMHG
TRICUSPID ANNULAR PLANE SYSTOLIC EXCURSION: 2.9 CM
TV PEAK GRADIENT: 2.33 MMHG
TV REST PULMONARY ARTERY PRESSURE: 23 MMHG

## 2023-02-06 PROCEDURE — 93320 STRESS ECHO (CUPID ONLY): ICD-10-PCS | Mod: 26,,, | Performed by: INTERNAL MEDICINE

## 2023-02-06 PROCEDURE — 93227 XTRNL ECG REC<48 HR R&I: CPT | Mod: ,,, | Performed by: INTERNAL MEDICINE

## 2023-02-06 PROCEDURE — 93325 STRESS ECHO (CUPID ONLY): ICD-10-PCS | Mod: 26,,, | Performed by: INTERNAL MEDICINE

## 2023-02-06 PROCEDURE — 93227 HOLTER MONITOR - 24 HOUR (CUPID ONLY): ICD-10-PCS | Mod: ,,, | Performed by: INTERNAL MEDICINE

## 2023-02-06 PROCEDURE — 93351 STRESS ECHO (CUPID ONLY): ICD-10-PCS | Mod: 26,,, | Performed by: INTERNAL MEDICINE

## 2023-02-06 PROCEDURE — 93320 DOPPLER ECHO COMPLETE: CPT | Mod: 26,,, | Performed by: INTERNAL MEDICINE

## 2023-02-06 PROCEDURE — 93325 DOPPLER ECHO COLOR FLOW MAPG: CPT | Mod: 26,,, | Performed by: INTERNAL MEDICINE

## 2023-02-06 PROCEDURE — 93226 XTRNL ECG REC<48 HR SCAN A/R: CPT

## 2023-02-06 PROCEDURE — 93351 STRESS TTE COMPLETE: CPT | Mod: 26,,, | Performed by: INTERNAL MEDICINE

## 2023-02-06 PROCEDURE — 93351 STRESS TTE COMPLETE: CPT

## 2023-02-07 ENCOUNTER — OFFICE VISIT (OUTPATIENT)
Dept: GASTROENTEROLOGY | Facility: CLINIC | Age: 48
End: 2023-02-07
Payer: COMMERCIAL

## 2023-02-07 VITALS — BODY MASS INDEX: 27.07 KG/M2 | WEIGHT: 162.69 LBS

## 2023-02-07 DIAGNOSIS — Z90.49 HISTORY OF BOWEL RESECTION: Primary | ICD-10-CM

## 2023-02-07 DIAGNOSIS — D50.9 IRON DEFICIENCY ANEMIA, UNSPECIFIED IRON DEFICIENCY ANEMIA TYPE: ICD-10-CM

## 2023-02-07 DIAGNOSIS — Z12.11 COLON CANCER SCREENING: ICD-10-CM

## 2023-02-07 PROCEDURE — 99999 PR PBB SHADOW E&M-EST. PATIENT-LVL III: ICD-10-PCS | Mod: PBBFAC,,, | Performed by: INTERNAL MEDICINE

## 2023-02-07 PROCEDURE — 99999 PR PBB SHADOW E&M-EST. PATIENT-LVL III: CPT | Mod: PBBFAC,,, | Performed by: INTERNAL MEDICINE

## 2023-02-07 PROCEDURE — 99204 PR OFFICE/OUTPT VISIT, NEW, LEVL IV, 45-59 MIN: ICD-10-PCS | Mod: S$GLB,,, | Performed by: INTERNAL MEDICINE

## 2023-02-07 PROCEDURE — 99204 OFFICE O/P NEW MOD 45 MIN: CPT | Mod: S$GLB,,, | Performed by: INTERNAL MEDICINE

## 2023-02-07 RX ORDER — SODIUM, POTASSIUM,MAG SULFATES 17.5-3.13G
1 SOLUTION, RECONSTITUTED, ORAL ORAL DAILY
Qty: 1 KIT | Refills: 0 | Status: SHIPPED | OUTPATIENT
Start: 2023-02-07 | End: 2023-02-09

## 2023-02-07 NOTE — PROGRESS NOTES
Subjective:       Patient ID: Fadumo Barnhart is a 47 y.o. female.    Chief Complaint: No chief complaint on file.    Patient here today to establish care with aforementioned complaints.  Patient reports surgical history of of bariatric surgery complicated by small-bowel obstruction in 2015 which was resected.  She was doing fairly well until recently when she began again losing a significant of weight unintentionally.  This may be multifactorial due to multiple stressors including death of father and 2 other family members.  She denies change in appetite but is often times busy with work and life occurrences to eat.    Patient does have history of anemia.  Denies overt blood loss    Past Medical History:   Diagnosis Date    ADD (attention deficit disorder with hyperactivity)     ADHD (attention deficit hyperactivity disorder)     Anemia     Anxiety     Back problem     Depression     GERD (gastroesophageal reflux disease)     Hypoglycemia        Past Surgical History:   Procedure Laterality Date    GASTRIC BYPASS      LEEP      SMALL INTESTINE SURGERY      intestine were twisted    TONSILLECTOMY      uterine embolization  12/21/2018       Social History  Social History     Tobacco Use    Smoking status: Never    Smokeless tobacco: Never   Substance Use Topics    Alcohol use: Yes     Comment: OCCASIONAL    Drug use: No       Family History   Problem Relation Age of Onset    Diabetes Mother     Heart disease Mother     Hypertension Mother     Kidney disease Mother     Miscarriages / Stillbirths Mother     Vision loss Mother     Hypertension Father     Alcohol abuse Father         prior, not current    Alcohol abuse Brother     Drug abuse Brother     Learning disabilities Brother         ADD/ADHD, dyslexia    Alcohol abuse Paternal Uncle     Cancer Paternal Uncle     Depression Paternal Uncle     Early death Paternal Uncle     Diabetes Maternal Aunt     Miscarriages / Stillbirths Maternal Aunt     Diabetes Maternal  Uncle     Kidney disease Maternal Uncle        Review of Systems   Constitutional:  Positive for unexpected weight change. Negative for appetite change.   HENT:  Negative for trouble swallowing.    Gastrointestinal:  Negative for abdominal distention, abdominal pain, constipation and diarrhea.         Objective:      Physical Exam  Constitutional:       Appearance: She is well-developed.   HENT:      Head: Normocephalic and atraumatic.   Eyes:      Conjunctiva/sclera: Conjunctivae normal.   Pulmonary:      Effort: Pulmonary effort is normal. No respiratory distress.   Musculoskeletal:      Cervical back: Normal range of motion.   Neurological:      Mental Status: She is alert and oriented to person, place, and time.   Psychiatric:         Behavior: Behavior normal.         Thought Content: Thought content normal.         Judgment: Judgment normal.         Pertinent labs and imaging studies reviewed    Assessment:       1. History of bowel resection    2. Iron deficiency anemia, unspecified iron deficiency anemia type    3. Colon cancer screening          Plan:       Schedule EGD/colonoscopy    (Portions of this note were dictated using voice recognition software and may contain dictation related errors in spelling/grammar/syntax not found on text review)

## 2023-02-07 NOTE — PATIENT INSTRUCTIONS
SUPREP Instructions    [facility] [address]  Clinic Office [phone]  Endoscopy Lab [phone]    You are scheduled for a Colonoscopy with Dr. Clancy  on 03/29/2023 at [facility] in [place].    Check in at the Hospital -1st floor, Information desk.   Call [phone] to reschedule.    An adult friend/family member must come with you to drive you home.  You cannot drive, take a taxi, Uber/Lyft or bus to leave the Endoscopy Center alone.  If you do not have someone to drive you home, your test will be cancelled.     Please follow the directions of your doctor if you take any pills that thin your blood. If you take these meds: Aggrenox, Brilinta, Effient, Eliquis, Lovenox, Plavix, Pletal, Pradaxa, Ticilid, Xarelto or Coumadin, let the doctor's office know.    DON'T: On the morning of the test do not take insulin or pills for diabetes.     DO: On the morning of the test, do take any pills for blood pressure, heart, anti-rejection and or seizures with a small sip of water. Bring any inhalers with you.    To have a good prep, you must follow these instructions - please do not use the directions from the pharmacy.    The doctor will send a prescription for the SUPREP.      The Day Before the test:    You can only drink CLEAR LIQUIDS the whole day before your test.  You can't eat any food for the whole day.    You CAN have:  Water, Coffee or decaf coffee (no milk or cream)  Tea  Soft drinks - regular and sugar free  Jello (green or yellow)  Apple Juice, white grape juice, white cranberry juice  Gatorade, Power Aid, Crystal Light, Jose Ramon Aid  Lemonade and Limeade  Bouillon, clear soup  Snowball, popsicles  YOU CAN'T DRINK ANYTHING RED, PURPLE ORANGE OR BLUE   YOU CAN'T DRINK ALCOHOL  ONLY DRINK WHAT IS ON THE LIST      At 5 pm the night before your test:    Pour the 1st bottle of SUPREP into the cup provided in the box. Add water to the line on the cup and mix well.  Drink the whole  cup and then drink 2 more full cups of water over 1 hour.  This can be easier to drink if it is cold. You can mix it 20 minutes ahead of time and place in the refrigerator before you drink it.  You must drink it within 30-45 minutes of mixing it.  Do NOT pour the drink over ice.  You can drink it with a straw.    The Day of the test - We will call you 2 days before your test to tell you what time to get there.    5 hours before you come to the hospital (this may be in the middle of the night)  Pour the 2nd bottle of SUPREP into the cup provided in the box. Add water to the line on the cup and mix well.  Drink the whole cup and then drink 2 more full cups of water over 1 hour.  It might be easier to drink if it is cold. You can mix it 20 minutes ahead of time and place in the refrigerator before you drink it.  You must drink it within 30-45 minutes of mixing it.  Do NOT pour the drink over ice.  You can drink it with a straw.    YOU CAN'T EAT OR DRINK ANYTHING ELSE ONCE YOU FINISH THE PREP    Leave all valuables and jewelry at home. You will be at the hospital for 2-4 hours.    Call the Endoscopy department at [phone] with any questions about your procedure.

## 2023-02-15 ENCOUNTER — CLINICAL SUPPORT (OUTPATIENT)
Dept: ENDOSCOPY | Facility: HOSPITAL | Age: 48
End: 2023-02-15
Attending: STUDENT IN AN ORGANIZED HEALTH CARE EDUCATION/TRAINING PROGRAM
Payer: COMMERCIAL

## 2023-02-15 DIAGNOSIS — Z12.11 COLON CANCER SCREENING: ICD-10-CM

## 2023-02-15 NOTE — PLAN OF CARE
Contacted pt to schedule procedure. Pt is already scheduled at [facility] on 3/29/23. Pt verbalized understanding.

## 2023-02-23 ENCOUNTER — PATIENT MESSAGE (OUTPATIENT)
Dept: OBSTETRICS AND GYNECOLOGY | Facility: CLINIC | Age: 48
End: 2023-02-23
Payer: COMMERCIAL

## 2023-02-24 ENCOUNTER — OFFICE VISIT (OUTPATIENT)
Dept: CARDIOLOGY | Facility: CLINIC | Age: 48
End: 2023-02-24
Payer: COMMERCIAL

## 2023-02-24 VITALS
SYSTOLIC BLOOD PRESSURE: 136 MMHG | HEART RATE: 91 BPM | OXYGEN SATURATION: 100 % | RESPIRATION RATE: 18 BRPM | HEIGHT: 65 IN | WEIGHT: 161.63 LBS | BODY MASS INDEX: 26.93 KG/M2 | DIASTOLIC BLOOD PRESSURE: 76 MMHG

## 2023-02-24 DIAGNOSIS — Z82.49 FAMILY HISTORY OF CORONARY ARTERY DISEASE: ICD-10-CM

## 2023-02-24 DIAGNOSIS — K21.9 GASTROESOPHAGEAL REFLUX DISEASE, UNSPECIFIED WHETHER ESOPHAGITIS PRESENT: ICD-10-CM

## 2023-02-24 DIAGNOSIS — R03.0 ELEVATED BLOOD PRESSURE READING WITHOUT DIAGNOSIS OF HYPERTENSION: ICD-10-CM

## 2023-02-24 DIAGNOSIS — F90.8 ATTENTION DEFICIT HYPERACTIVITY DISORDER (ADHD), OTHER TYPE: ICD-10-CM

## 2023-02-24 DIAGNOSIS — Z79.899 HIGH RISK MEDICATION USE: ICD-10-CM

## 2023-02-24 DIAGNOSIS — R00.0 TACHYCARDIA: Primary | ICD-10-CM

## 2023-02-24 PROCEDURE — 99214 OFFICE O/P EST MOD 30 MIN: CPT | Mod: S$GLB,,, | Performed by: INTERNAL MEDICINE

## 2023-02-24 PROCEDURE — 99214 PR OFFICE/OUTPT VISIT, EST, LEVL IV, 30-39 MIN: ICD-10-PCS | Mod: S$GLB,,, | Performed by: INTERNAL MEDICINE

## 2023-02-24 PROCEDURE — 99999 PR PBB SHADOW E&M-EST. PATIENT-LVL III: CPT | Mod: PBBFAC,,, | Performed by: INTERNAL MEDICINE

## 2023-02-24 PROCEDURE — 99999 PR PBB SHADOW E&M-EST. PATIENT-LVL III: ICD-10-PCS | Mod: PBBFAC,,, | Performed by: INTERNAL MEDICINE

## 2023-02-24 NOTE — PROGRESS NOTES
CARDIOLOGY CLINIC VISIT        HISTORY OF PRESENT ILLNESS:     01/24/2023: Tran Barnhart presents for cardiovascular evaluation.  Recently presented to the emergency room with dizziness.  Her blood pressure and heart rate were elevated.  EKG showed normal sinus rhythm.  She denies any history of heart problems.  There is a family history.  Over the past number of months she has been under a significant amount of stress.  Father recently passed away.  To acquaintances have had heart attacks.  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  She does feel her heart racing at times.  She tends to equate it to anxiety.    02/24/2023:  Exercise stress echocardiogram showed ejection fraction of 60%.  EKG as well as echo portion were negative for ischemia.  The patient exercised for 8 minutes 54 seconds.  Functional capacity 10 Mets. She returned her holter today.    CARDIOVASCULAR HISTORY:     None    PAST MEDICAL HISTORY:     Past Medical History:   Diagnosis Date    ADD (attention deficit disorder with hyperactivity)     ADHD (attention deficit hyperactivity disorder)     Anemia     Anxiety     Back problem     Depression     GERD (gastroesophageal reflux disease)     Hypoglycemia        PAST SURGICAL HISTORY:     Past Surgical History:   Procedure Laterality Date    GASTRIC BYPASS      LEEP      SMALL INTESTINE SURGERY      intestine were twisted    TONSILLECTOMY      uterine embolization  12/21/2018       ALLERGIES AND MEDICATION:     Review of patient's allergies indicates:   Allergen Reactions    Aspirin Other (See Comments)     Other reaction(s): Stomach upset    Hydrocodone-acetaminophen Other (See Comments)     Other reaction(s): dizziness    Aspirin Rash        Medication List            Accurate as of February 24, 2023  3:30 PM. If you have any questions, ask your nurse or doctor.                CONTINUE taking these medications      dextroamphetamine-amphetamine 30  MG 24 hr capsule  Commonly known as: ADDERALL XR  Take 1 capsule (30 mg total) by mouth every morning.     ibuprofen 800 MG tablet  Commonly known as: ADVIL,MOTRIN  TAKE 1 TABLET EVERY 6 HOURS AS NEEDED FOR PAIN     multivitamin capsule     pantoprazole 40 MG tablet  Commonly known as: PROTONIX  Take 1 tablet (40 mg total) by mouth once daily.     zolpidem 5 MG Tab  Commonly known as: AMBIEN  Take 1 tablet (5 mg total) by mouth nightly as needed (insomnia).              SOCIAL HISTORY:     Social History     Socioeconomic History    Marital status:    Occupational History     Employer: [employer]  [place]   Tobacco Use    Smoking status: Never    Smokeless tobacco: Never   Substance and Sexual Activity    Alcohol use: Yes     Comment: OCCASIONAL    Drug use: No    Sexual activity: Yes     Partners: Male       FAMILY HISTORY:     Family History   Problem Relation Age of Onset    Diabetes Mother     Heart disease Mother     Hypertension Mother     Kidney disease Mother     Miscarriages / Stillbirths Mother     Vision loss Mother     Hypertension Father     Alcohol abuse Father         prior, not current    Alcohol abuse Brother     Drug abuse Brother     Learning disabilities Brother         ADD/ADHD, dyslexia    Alcohol abuse Paternal Uncle     Cancer Paternal Uncle     Depression Paternal Uncle     Early death Paternal Uncle     Diabetes Maternal Aunt     Miscarriages / Stillbirths Maternal Aunt     Diabetes Maternal Uncle     Kidney disease Maternal Uncle        REVIEW OF SYSTEMS:   Review of Systems   Constitutional:  Negative for chills, diaphoresis, fever, malaise/fatigue and weight loss.   HENT:  Negative for congestion, hearing loss, sinus pain, sore throat and tinnitus.    Eyes:  Negative for blurred vision, double vision, photophobia and pain.   Respiratory:  Negative for cough, hemoptysis, sputum production, shortness of breath, wheezing and stridor.    Cardiovascular:  Positive for  "palpitations. Negative for chest pain, orthopnea, claudication, leg swelling and PND.   Gastrointestinal:  Negative for abdominal pain, blood in stool, heartburn, melena, nausea and vomiting.   Musculoskeletal:  Negative for back pain, falls, joint pain, myalgias and neck pain.   Neurological:  Negative for dizziness, tingling, tremors, sensory change, speech change, focal weakness, seizures, loss of consciousness, weakness and headaches.   Endo/Heme/Allergies:  Does not bruise/bleed easily.   Psychiatric/Behavioral:  Negative for depression, memory loss and substance abuse. The patient is nervous/anxious.      PHYSICAL EXAM:     Vitals:    02/24/23 1511   BP: 136/76   Pulse: 91   Resp: 18    Body mass index is 26.89 kg/m².  Weight: 73.3 kg (161 lb 9.6 oz)   Height: 5' 5" (165.1 cm)     Physical Exam  Vitals reviewed.   Constitutional:       General: She is not in acute distress.     Appearance: Normal appearance. She is well-developed. She is not diaphoretic.   HENT:      Head: Normocephalic.   Neck:      Vascular: No carotid bruit or JVD.   Cardiovascular:      Rate and Rhythm: Normal rate and regular rhythm.      Pulses: Normal pulses.      Heart sounds: Normal heart sounds.   Pulmonary:      Effort: Pulmonary effort is normal.      Breath sounds: Normal breath sounds.   Abdominal:      General: Bowel sounds are normal.      Palpations: Abdomen is soft.      Tenderness: There is no abdominal tenderness.   Skin:     General: Skin is warm and dry.   Neurological:      Mental Status: She is alert and oriented to person, place, and time.   Psychiatric:         Speech: Speech normal.         Behavior: Behavior normal.         Thought Content: Thought content normal.       DATA:   EKG: (personally reviewed tracing)  01/13/2023 - NSR  Laboratory:  CBC:  Recent Labs   Lab 02/13/21  1030 12/19/22  0807   WBC 2.95 L 4.98   Hemoglobin 11.8 L 12.6   Hematocrit 37.1 39.2   Platelets 227 235       CHEMISTRIES:  Recent Labs "   Lab 02/13/21  1030 12/19/22  0807   Glucose 100 93   Sodium 139 141   Potassium 3.8 3.7   BUN 11 11   Creatinine 0.8 0.9   eGFR if  >60  --    eGFR if non African American >60  --    Calcium 8.9 9.6       CARDIAC BIOMARKERS:        COAGS:        LIPIDS/LFTS:  Recent Labs   Lab 02/13/21  1030 12/19/22  0807   Cholesterol 161 151   Triglycerides 49 52   HDL 83 H 97 H   LDL Cholesterol 68.2 43.6 L   Non-HDL Cholesterol 78 54   AST 22 16   ALT 20 16       Cardiovascular Testing:    Exercise stress echocardiogram 02/06/2023:    The left ventricle is normal in size with normal systolic function.  The estimated ejection fraction is 60%.  Normal left ventricular diastolic function.  Normal right ventricular size with normal right ventricular systolic function.  Mild left atrial enlargement.  Normal central venous pressure (3 mmHg).  The estimated PA systolic pressure is 23 mmHg.  The patient's exercise capacity was average.  There were no arrhythmias during stress.  The ECG portion of this study is negative for myocardial ischemia.  The stress echo portion of this study is negative for myocardial ischemia.    ASSESSMENT:     Tachycardia  Elevated blood pressure reading without diagnosis of hypertension  Family history of coronary artery disease  ADHD  GERD    PLAN:     Tachycardia: holter pending.  Stress echo negative for ischemia.  No arrhythmias noted.  Elevated blood pressure: 2d echocardiogram shows normal function.  No left ventricular hypertrophy.  ADHD/high risk medication: holter pending.  Stress negative for ischemia.  No arrhythmias.  Return to clinic 6 months unless abnormality noted on holter.           Mars Light MD, MPH, FACC, [facility]

## 2023-03-08 ENCOUNTER — TELEPHONE (OUTPATIENT)
Dept: CARDIOLOGY | Facility: CLINIC | Age: 48
End: 2023-03-08
Payer: COMMERCIAL

## 2023-03-08 NOTE — TELEPHONE ENCOUNTER
I have attempted to contact this patient by phone with the following results: no answer, left message to return my call on answering machine.     Pastora Bautista  03/08/2023  9:07 AM          ----- Message from Mars Light MD sent at 3/8/2023  8:01 AM CST -----  Can we see if this patient has turned in her holter please    ----- Message -----  From: SYSTEM  Sent: 3/8/2023   1:06 AM CST  To: Mars Light MD

## 2023-03-13 LAB
OHS CV EVENT MONITOR DAY: 0
OHS CV HOLTER LENGTH DECIMAL HOURS: 23.98
OHS CV HOLTER LENGTH HOURS: 23
OHS CV HOLTER LENGTH MINUTES: 59
OHS CV HOLTER SINUS AVERAGE HR: 102
OHS CV HOLTER SINUS MAX HR: 146
OHS CV HOLTER SINUS MIN HR: 71

## 2023-03-27 ENCOUNTER — PATIENT MESSAGE (OUTPATIENT)
Dept: ENDOSCOPY | Facility: HOSPITAL | Age: 48
End: 2023-03-27
Payer: COMMERCIAL

## 2023-03-27 ENCOUNTER — TELEPHONE (OUTPATIENT)
Dept: ENDOSCOPY | Facility: HOSPITAL | Age: 48
End: 2023-03-27
Payer: COMMERCIAL

## 2023-03-27 NOTE — TELEPHONE ENCOUNTER
Spoke with patient about arrival time @ 0700 wed      Prep instructions reviewed: the day before the procedure, follow a clear liquid diet all day, then start the first 1/2 of prep at 5pm and take 2nd 1/2 of prep @ 0200 wed. Pt must be completely NPO when prep completed @ 0400 wed.             Medications: Do not take Insulin or oral diabetic medications the day of the procedure.  Take as prescribed: heart, seizure and blood pressure medication in the morning with a sip of water (less than an ounce).  Take any breathing medications and bring inhalers to hospital with you Leave all valuables and jewelry at home.     Wear comfortable clothes to procedure to change into hospital gown You cannot drive for 24 hours after your procedure because you will receive sedation for your procedure to make you comfortable.  A ride must be provided at discharge.

## 2023-03-29 ENCOUNTER — ANESTHESIA EVENT (OUTPATIENT)
Dept: ENDOSCOPY | Facility: HOSPITAL | Age: 48
End: 2023-03-29
Payer: COMMERCIAL

## 2023-03-29 ENCOUNTER — HOSPITAL ENCOUNTER (OUTPATIENT)
Facility: HOSPITAL | Age: 48
Discharge: HOME | End: 2023-03-29
Attending: INTERNAL MEDICINE | Admitting: INTERNAL MEDICINE
Payer: COMMERCIAL

## 2023-03-29 ENCOUNTER — ANESTHESIA (OUTPATIENT)
Dept: ENDOSCOPY | Facility: HOSPITAL | Age: 48
End: 2023-03-29
Payer: COMMERCIAL

## 2023-03-29 VITALS
OXYGEN SATURATION: 100 % | DIASTOLIC BLOOD PRESSURE: 86 MMHG | HEART RATE: 84 BPM | HEIGHT: 65 IN | WEIGHT: 163 LBS | SYSTOLIC BLOOD PRESSURE: 122 MMHG | BODY MASS INDEX: 27.16 KG/M2 | RESPIRATION RATE: 19 BRPM | TEMPERATURE: 98 F

## 2023-03-29 DIAGNOSIS — Z12.11 COLON CANCER SCREENING: ICD-10-CM

## 2023-03-29 LAB
B-HCG UR QL: NEGATIVE
CTP QC/QA: YES
POCT GLUCOSE: 76 MG/DL (ref 70–110)

## 2023-03-29 PROCEDURE — 45380 COLONOSCOPY AND BIOPSY: CPT | Mod: PT | Performed by: INTERNAL MEDICINE

## 2023-03-29 PROCEDURE — 88305 TISSUE EXAM BY PATHOLOGIST: CPT | Performed by: PATHOLOGY

## 2023-03-29 PROCEDURE — D9220A PRA ANESTHESIA: ICD-10-PCS | Mod: CRNA,,, | Performed by: NURSE ANESTHETIST, CERTIFIED REGISTERED

## 2023-03-29 PROCEDURE — 25000003 PHARM REV CODE 250: Performed by: INTERNAL MEDICINE

## 2023-03-29 PROCEDURE — 43239 PR EGD, FLEX, W/BIOPSY, SGL/MULTI: ICD-10-PCS | Mod: 51,,, | Performed by: INTERNAL MEDICINE

## 2023-03-29 PROCEDURE — 37000008 HC ANESTHESIA 1ST 15 MINUTES: Performed by: INTERNAL MEDICINE

## 2023-03-29 PROCEDURE — 45380 COLONOSCOPY AND BIOPSY: CPT | Mod: 33,,, | Performed by: INTERNAL MEDICINE

## 2023-03-29 PROCEDURE — 25000003 PHARM REV CODE 250: Performed by: NURSE ANESTHETIST, CERTIFIED REGISTERED

## 2023-03-29 PROCEDURE — 81025 URINE PREGNANCY TEST: CPT | Performed by: INTERNAL MEDICINE

## 2023-03-29 PROCEDURE — 37000009 HC ANESTHESIA EA ADD 15 MINS: Performed by: INTERNAL MEDICINE

## 2023-03-29 PROCEDURE — 43239 EGD BIOPSY SINGLE/MULTIPLE: CPT | Mod: 51,,, | Performed by: INTERNAL MEDICINE

## 2023-03-29 PROCEDURE — 88305 TISSUE EXAM BY PATHOLOGIST: CPT | Mod: 26,,, | Performed by: PATHOLOGY

## 2023-03-29 PROCEDURE — 43239 EGD BIOPSY SINGLE/MULTIPLE: CPT | Performed by: INTERNAL MEDICINE

## 2023-03-29 PROCEDURE — D9220A PRA ANESTHESIA: Mod: ANES,,, | Performed by: ANESTHESIOLOGY

## 2023-03-29 PROCEDURE — 88305 TISSUE EXAM BY PATHOLOGIST: ICD-10-PCS | Mod: 26,,, | Performed by: PATHOLOGY

## 2023-03-29 PROCEDURE — 27201012 HC FORCEPS, HOT/COLD, DISP: Performed by: INTERNAL MEDICINE

## 2023-03-29 PROCEDURE — 63600175 PHARM REV CODE 636 W HCPCS: Performed by: NURSE ANESTHETIST, CERTIFIED REGISTERED

## 2023-03-29 PROCEDURE — 45380 PR COLONOSCOPY,BIOPSY: ICD-10-PCS | Mod: 33,,, | Performed by: INTERNAL MEDICINE

## 2023-03-29 PROCEDURE — D9220A PRA ANESTHESIA: ICD-10-PCS | Mod: ANES,,, | Performed by: ANESTHESIOLOGY

## 2023-03-29 PROCEDURE — D9220A PRA ANESTHESIA: Mod: CRNA,,, | Performed by: NURSE ANESTHETIST, CERTIFIED REGISTERED

## 2023-03-29 RX ORDER — SODIUM CHLORIDE 9 MG/ML
INJECTION, SOLUTION INTRAVENOUS CONTINUOUS
Status: DISCONTINUED | OUTPATIENT
Start: 2023-03-29 | End: 2023-03-29 | Stop reason: HOSPADM

## 2023-03-29 RX ORDER — SODIUM CHLORIDE 0.9 % (FLUSH) 0.9 %
10 SYRINGE (ML) INJECTION ONCE
Status: DISCONTINUED | OUTPATIENT
Start: 2023-03-29 | End: 2023-03-29 | Stop reason: HOSPADM

## 2023-03-29 RX ORDER — PROPOFOL 10 MG/ML
VIAL (ML) INTRAVENOUS
Status: DISCONTINUED | OUTPATIENT
Start: 2023-03-29 | End: 2023-03-29

## 2023-03-29 RX ORDER — LIDOCAINE HYDROCHLORIDE 20 MG/ML
INJECTION, SOLUTION EPIDURAL; INFILTRATION; INTRACAUDAL; PERINEURAL
Status: DISCONTINUED | OUTPATIENT
Start: 2023-03-29 | End: 2023-03-29

## 2023-03-29 RX ORDER — PROPOFOL 10 MG/ML
VIAL (ML) INTRAVENOUS CONTINUOUS PRN
Status: DISCONTINUED | OUTPATIENT
Start: 2023-03-29 | End: 2023-03-29

## 2023-03-29 RX ORDER — DEXMEDETOMIDINE HYDROCHLORIDE 100 UG/ML
INJECTION, SOLUTION INTRAVENOUS
Status: DISCONTINUED | OUTPATIENT
Start: 2023-03-29 | End: 2023-03-29

## 2023-03-29 RX ADMIN — GLYCOPYRROLATE 0.2 MG: 0.2 INJECTION, SOLUTION INTRAMUSCULAR; INTRAVITREAL at 08:03

## 2023-03-29 RX ADMIN — SODIUM CHLORIDE: 0.9 INJECTION, SOLUTION INTRAVENOUS at 07:03

## 2023-03-29 RX ADMIN — LIDOCAINE HYDROCHLORIDE 50 MG: 20 INJECTION, SOLUTION EPIDURAL; INFILTRATION; INTRACAUDAL; PERINEURAL at 08:03

## 2023-03-29 RX ADMIN — PROPOFOL 150 MCG/KG/MIN: 10 INJECTION, EMULSION INTRAVENOUS at 08:03

## 2023-03-29 RX ADMIN — DEXMEDETOMIDINE HCL 8 MCG: 100 INJECTION INTRAVENOUS at 08:03

## 2023-03-29 RX ADMIN — PROPOFOL 70 MG: 10 INJECTION, EMULSION INTRAVENOUS at 08:03

## 2023-03-29 RX ADMIN — TOPICAL ANESTHETIC 1 EACH: 200 SPRAY DENTAL; PERIODONTAL at 08:03

## 2023-03-29 NOTE — PROVATION PATIENT INSTRUCTIONS
Discharge Summary/Instructions after an Endoscopic Procedure  Patient Name: Fadumo Cornelius  Patient MRN: [id number]  Patient YOB: 1975 Wednesday, March 29, 2023  Jonas Clancy MD  Dear patient,  As a result of recent federal legislation (The Federal Cures Act), you may   receive lab or pathology results from your procedure in your [facility]   account before your physician is able to contact you. Your physician or   their representative will relay the results to you with their   recommendations at their soonest availability.  Thank you,  Your health is very important to us during the Covid Crisis. Following your   procedure today, you will receive a daily text for 2 weeks asking about   signs or symptoms of Covid 19.  Please respond to this text when you   receive it so we can follow up and keep you as safe as possible.   RESTRICTIONS:  During your procedure today, you received medications for sedation.  These   medications may affect your judgment, balance and coordination.  Therefore,   for 24 hours, you have the following restrictions:   - DO NOT drive a car, operate machinery, make legal/financial decisions,   sign important papers or drink alcohol.    ACTIVITY:  Today: no heavy lifting, straining or running due to procedural   sedation/anesthesia.  The following day: return to full activity including work.  DIET:  Eat and drink normally unless instructed otherwise.     TREATMENT FOR COMMON SIDE EFFECTS:  - Mild abdominal pain, nausea, belching, bloating or excessive gas:  rest,   eat lightly and use a heating pad.  - Sore Throat: treat with throat lozenges and/or gargle with warm salt   water.  - Because air was used during the procedure, expelling large amounts of air   from your rectum or belching is normal.  - If a bowel prep was taken, you may not have a bowel movement for 1-3 days.    This is normal.  SYMPTOMS TO WATCH FOR AND REPORT TO YOUR PHYSICIAN:  1. Abdominal pain or  bloating, other than gas cramps.  2. Chest pain.  3. Back pain.  4. Signs of infection such as: chills or fever occurring within 24 hours   after the procedure.  5. Rectal bleeding, which would show as bright red, maroon, or black stools.   (A tablespoon of blood from the rectum is not serious, especially if   hemorrhoids are present.)  6. Vomiting.  7. Weakness or dizziness.  GO DIRECTLY TO THE NEAREST EMERGENCY ROOM IF YOU HAVE ANY OF THE FOLLOWING:      Difficulty breathing              Chills and/or fever over 101 F   Persistent vomiting and/or vomiting blood   Severe abdominal pain   Severe chest pain   Black, tarry stools   Bleeding- more than one tablespoon   Any other symptom or condition that you feel may need urgent attention  Your doctor recommends these additional instructions:  If any biopsies were taken, your doctors clinic will contact you in 1 to 2   weeks with any results.  - Discharge patient to home.   - Resume previous diet.   - Continue present medications.   - Await pathology results.   - Perform a colonoscopy as previously scheduled.  For questions, problems or results please call your physician - Jonas Clancy MD.  EMERGENCY PHONE NUMBER: [phone],  LAB RESULTS: [phone]  IF A COMPLICATION OR EMERGENCY SITUATION ARISES AND YOU ARE UNABLE TO REACH   YOUR PHYSICIAN - GO DIRECTLY TO THE EMERGENCY ROOM.  Jonas Clancy MD  3/29/2023 8:16:10 AM  This report has been verified and signed electronically.  Dear patient,  As a result of recent federal legislation (The Federal Cures Act), you may   receive lab or pathology results from your procedure in your [facility]   account before your physician is able to contact you. Your physician or   their representative will relay the results to you with their   recommendations at their soonest availability.  Thank you,  PROVATION

## 2023-03-29 NOTE — ANESTHESIA POSTPROCEDURE EVALUATION
Anesthesia Post Evaluation    Patient: Fadumo Barnhart    Procedure(s) Performed: Procedure(s) (LRB):  EGD (ESOPHAGOGASTRODUODENOSCOPY) (N/A)  COLONOSCOPY (N/A)    Final Anesthesia Type: MAC      Patient location during evaluation: GI PACU  Patient participation: Yes- Able to Participate  Level of consciousness: awake and alert  Post-procedure vital signs: reviewed and stable  Pain management: adequate  Airway patency: patent    PONV status at discharge: No PONV  Anesthetic complications: no      Cardiovascular status: blood pressure returned to baseline  Respiratory status: unassisted  Hydration status: euvolemic            Vitals Value Taken Time   /86 03/29/23 0905   Temp 36.7 °C (98.1 °F) 03/29/23 0835   Pulse 84 03/29/23 0905   Resp 19 03/29/23 0905   SpO2 100 % 03/29/23 0905         Event Time   Out of Recovery 09:05:00         Pain/Jakob Score: No data recorded

## 2023-03-29 NOTE — H&P
Short Stay Endoscopy History and Physical    PCP - Suze Chi MD    Procedure - EGD/colonoscopy  ASA - III  Mallampati - per anesthesia  History of Anesthesia problems - no  Family history Anesthesia problems - no     HPI:  This is a 47 y.o. female here for evaluation of : CARSON    ROS:  Constitutional: No fevers, chills, No weight loss  ENT: No allergies  CV: No chest pain  Pulm: No shortness of breath  GI: see HPI  Derm: No rash    Medical History:  has a past medical history of ADD (attention deficit disorder with hyperactivity), ADHD (attention deficit hyperactivity disorder), Anemia, Anxiety, Back problem, Depression, GERD (gastroesophageal reflux disease), and Hypoglycemia.    Surgical History:  has a past surgical history that includes Tonsillectomy; LEEP; Gastric bypass; Small intestine surgery; and uterine embolization (12/21/2018).    Family History: family history includes Alcohol abuse in her brother, father, and paternal uncle; Cancer in her paternal uncle; Depression in her paternal uncle; Diabetes in her maternal aunt, maternal uncle, and mother; Drug abuse in her brother; Early death in her paternal uncle; Heart disease in her mother; Hypertension in her father and mother; Kidney disease in her maternal uncle and mother; Learning disabilities in her brother; Miscarriages / Stillbirths in her maternal aunt and mother; Vision loss in her mother.. Otherwise no colon cancer, inflammatory bowel disease, or GI malignancies.    Social History:  reports that she has never smoked. She has never used smokeless tobacco. She reports current alcohol use. She reports that she does not use drugs.    Review of patient's allergies indicates:   Allergen Reactions    Aspirin Other (See Comments)     Other reaction(s): Stomach upset    Hydrocodone-acetaminophen Other (See Comments)     Other reaction(s): dizziness    Aspirin Rash       Medications:   Medications Prior to Admission   Medication Sig Dispense Refill Last  Dose    dextroamphetamine-amphetamine (ADDERALL XR) 30 MG 24 hr capsule Take 1 capsule (30 mg total) by mouth every morning. 90 capsule 0 3/28/2023    ibuprofen (ADVIL,MOTRIN) 800 MG tablet TAKE 1 TABLET EVERY 6 HOURS AS NEEDED FOR PAIN 90 tablet 14 Past Week    multivitamin capsule Take 1 capsule by mouth once daily.   Past Week    pantoprazole (PROTONIX) 40 MG tablet Take 1 tablet (40 mg total) by mouth once daily. 90 tablet 2 3/27/2023    zolpidem (AMBIEN) 5 MG Tab Take 1 tablet (5 mg total) by mouth nightly as needed (insomnia). 15 tablet 0          Objective Findings:    Vital Signs: see nursing notes  Physical Exam:  General Appearance: Well appearing in no acute distress  Eyes:    No scleral icterus  ENT: Neck supple  Lungs: CTA anteriorly  Heart:  S1, S2 normal, no murmurs heard  Abdomen: Soft, non tender, non distended with positive bowel sounds. No hepatosplenomegaly, ascites, or mass  Extremities: no edema  Skin: No rash      Labs:  Lab Results   Component Value Date    WBC 4.98 12/19/2022    HGB 12.6 12/19/2022    HCT 39.2 12/19/2022     12/19/2022    CHOL 151 12/19/2022    TRIG 52 12/19/2022    HDL 97 (H) 12/19/2022    ALT 16 12/19/2022    AST 16 12/19/2022     12/19/2022    K 3.7 12/19/2022     12/19/2022    CREATININE 0.9 12/19/2022    BUN 11 12/19/2022    CO2 25 12/19/2022    TSH 1.503 12/19/2022    HGBA1C 5.3 12/19/2022       I have explained the risks and benefits of endoscopy procedures to the patient including but not limited to bleeding, perforation, infection, and death.    Jonas Clancy MD

## 2023-03-29 NOTE — [employer] PATIENT INSTRUCTIONS
Discharge Summary/Instructions after an Endoscopic Procedure  Patient Name: Fadumo Cornelius  Patient MRN: [id number]  Patient YOB: 1975 Wednesday, March 29, 2023  Jonas Clancy MD  Dear patient,  As a result of recent federal legislation (The Federal Cures Act), you may   receive lab or pathology results from your procedure in your [facility]   account before your physician is able to contact you. Your physician or   their representative will relay the results to you with their   recommendations at their soonest availability.  Thank you,  Your health is very important to us during the Covid Crisis. Following your   procedure today, you will receive a daily text for 2 weeks asking about   signs or symptoms of Covid 19.  Please respond to this text when you   receive it so we can follow up and keep you as safe as possible.   RESTRICTIONS:  During your procedure today, you received medications for sedation.  These   medications may affect your judgment, balance and coordination.  Therefore,   for 24 hours, you have the following restrictions:   - DO NOT drive a car, operate machinery, make legal/financial decisions,   sign important papers or drink alcohol.    ACTIVITY:  Today: no heavy lifting, straining or running due to procedural   sedation/anesthesia.  The following day: return to full activity including work.  DIET:  Eat and drink normally unless instructed otherwise.     TREATMENT FOR COMMON SIDE EFFECTS:  - Mild abdominal pain, nausea, belching, bloating or excessive gas:  rest,   eat lightly and use a heating pad.  - Sore Throat: treat with throat lozenges and/or gargle with warm salt   water.  - Because air was used during the procedure, expelling large amounts of air   from your rectum or belching is normal.  - If a bowel prep was taken, you may not have a bowel movement for 1-3 days.    This is normal.  SYMPTOMS TO WATCH FOR AND REPORT TO YOUR PHYSICIAN:  1. Abdominal pain or  bloating, other than gas cramps.  2. Chest pain.  3. Back pain.  4. Signs of infection such as: chills or fever occurring within 24 hours   after the procedure.  5. Rectal bleeding, which would show as bright red, maroon, or black stools.   (A tablespoon of blood from the rectum is not serious, especially if   hemorrhoids are present.)  6. Vomiting.  7. Weakness or dizziness.  GO DIRECTLY TO THE NEAREST EMERGENCY ROOM IF YOU HAVE ANY OF THE FOLLOWING:      Difficulty breathing              Chills and/or fever over 101 F   Persistent vomiting and/or vomiting blood   Severe abdominal pain   Severe chest pain   Black, tarry stools   Bleeding- more than one tablespoon   Any other symptom or condition that you feel may need urgent attention  Your doctor recommends these additional instructions:  If any biopsies were taken, your doctors clinic will contact you in 1 to 2   weeks with any results.  - Discharge patient to home.   - Resume previous diet.   - Continue present medications.   - Await pathology results.   - Repeat colonoscopy in 10 years for screening purposes.   - Patient has a contact number available for emergencies.  The signs and   symptoms of potential delayed complications were discussed with the   patient.  Return to normal activities tomorrow.  Written discharge   instructions were provided to the patient.  For questions, problems or results please call your physician - Jonas Clancy MD.  EMERGENCY PHONE NUMBER: [phone],  LAB RESULTS: [phone]  IF A COMPLICATION OR EMERGENCY SITUATION ARISES AND YOU ARE UNABLE TO REACH   YOUR PHYSICIAN - GO DIRECTLY TO THE EMERGENCY ROOM.  Jonas Clancy MD  3/29/2023 8:35:52 AM  This report has been verified and signed electronically.  Dear patient,  As a result of recent federal legislation (The Federal Cures Act), you may   receive lab or pathology results from your procedure in your [facility]   account before your physician is able  to contact you. Your physician or   their representative will relay the results to you with their   recommendations at their soonest availability.  Thank you,  [employer]ATION

## 2023-03-29 NOTE — ANESTHESIA PREPROCEDURE EVALUATION
03/29/2023     Fadumo Barnhart is a 47 y.o., female here for EGD    Past Medical History:   Diagnosis Date    ADD (attention deficit disorder with hyperactivity)     ADHD (attention deficit hyperactivity disorder)     Anemia     Anxiety     Back problem     Depression     GERD (gastroesophageal reflux disease)     Hypoglycemia      Past Surgical History:   Procedure Laterality Date    GASTRIC BYPASS      LEEP      SMALL INTESTINE SURGERY      intestine were twisted    TONSILLECTOMY      uterine embolization  12/21/2018       Pre-op Assessment    I have reviewed the Patient Summary Reports.     I have reviewed the Nursing Notes. I have reviewed the NPO Status.      Review of Systems  Anesthesia Hx:  History of prior surgery of interest to airway management or planning:  Denies Personal Hx of Anesthesia complications.   Cardiovascular:   Exercise tolerance: good    Pulmonary:  Pulmonary Normal    Hepatic/GI:   GERD Hx of gastric bypass   Psych:   Psychiatric History          Physical Exam  General: Well nourished    Airway:  Mallampati: II   Mouth Opening: Normal  TM Distance: Normal  Pre-Existing Airway: Oral Endotracheal tube        Anesthesia Plan  Type of Anesthesia, risks & benefits discussed:    Anesthesia Type: Gen Natural Airway  Informed Consent: Informed consent signed with the Patient and all parties understand the risks and agree with anesthesia plan.  All questions answered.   ASA Score: 2    Ready For Surgery From Anesthesia Perspective.     .

## 2023-03-29 NOTE — TRANSFER OF CARE
"Anesthesia Transfer of Care Note    Patient: Fadumo Barnhart    Procedure(s) Performed: Procedure(s) (LRB):  EGD (ESOPHAGOGASTRODUODENOSCOPY) (N/A)  COLONOSCOPY (N/A)    Patient location: GI    Anesthesia Type: general    Transport from OR: Transported from OR on room air with adequate spontaneous ventilation    Post pain: adequate analgesia    Post assessment: no apparent anesthetic complications and tolerated procedure well    Post vital signs: stable    Level of consciousness: awake and alert    Nausea/Vomiting: no nausea/vomiting    Complications: none    Transfer of care protocol was followed      Last vitals:   Visit Vitals  BP (!) 151/81 (Patient Position: Lying)   Pulse 75   Temp 36.8 °C (98.2 °F)   Resp 18   Ht 5' 5" (1.651 m)   Wt 73.9 kg (163 lb)   LMP 02/07/2023   SpO2 100%   Breastfeeding No   BMI 27.12 kg/m²     "

## 2023-04-05 LAB
FINAL PATHOLOGIC DIAGNOSIS: NORMAL
GROSS: NORMAL
Lab: NORMAL

## 2023-04-12 ENCOUNTER — PATIENT MESSAGE (OUTPATIENT)
Dept: ADMINISTRATIVE | Facility: HOSPITAL | Age: 48
End: 2023-04-12
Payer: COMMERCIAL

## 2023-04-21 ENCOUNTER — OFFICE VISIT (OUTPATIENT)
Dept: FAMILY MEDICINE | Facility: CLINIC | Age: 48
End: 2023-04-21
Payer: COMMERCIAL

## 2023-04-21 DIAGNOSIS — K21.9 GASTROESOPHAGEAL REFLUX DISEASE, UNSPECIFIED WHETHER ESOPHAGITIS PRESENT: Primary | ICD-10-CM

## 2023-04-21 DIAGNOSIS — F90.9 ATTENTION DEFICIT HYPERACTIVITY DISORDER (ADHD), UNSPECIFIED ADHD TYPE: ICD-10-CM

## 2023-04-21 PROCEDURE — 99214 OFFICE O/P EST MOD 30 MIN: CPT | Mod: 95,,, | Performed by: FAMILY MEDICINE

## 2023-04-21 PROCEDURE — 99214 PR OFFICE/OUTPT VISIT, EST, LEVL IV, 30-39 MIN: ICD-10-PCS | Mod: 95,,, | Performed by: FAMILY MEDICINE

## 2023-04-21 RX ORDER — DEXTROAMPHETAMINE SACCHARATE, AMPHETAMINE ASPARTATE MONOHYDRATE, DEXTROAMPHETAMINE SULFATE AND AMPHETAMINE SULFATE 7.5; 7.5; 7.5; 7.5 MG/1; MG/1; MG/1; MG/1
30 CAPSULE, EXTENDED RELEASE ORAL EVERY MORNING
Qty: 90 CAPSULE | Refills: 0 | Status: SHIPPED | OUTPATIENT
Start: 2023-04-21 | End: 2023-09-26 | Stop reason: SDUPTHER

## 2023-04-21 NOTE — PROGRESS NOTES
Telemedicine Office Visit    Fadumo Barnhart    1975  [id number]    Subjective     The patient location is: home   The chief complaint leading to consultation is: ADHD   Visit type: Virtual visit with synchronous audio and video  Total time spent with patient: 15 minutes   Each patient to whom he or she provides medical services by telemedicine is:  (1) informed of the relationship between the physician and patient and the respective role of any other health care provider with respect to management of the patient; and (2) notified that he or she may decline to receive medical services by telemedicine and may withdraw from such care at any time.    Fadumo is a 47 y.o. female who presents through telemedicine for:     ADHD - Patient is on adderall. She has been out of medication for a few weeks. She feels she is not as functional when she does not have her medication. 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 She took a break due to family deaths. She did have episodes of heart palpitations, and GI concerns. She has been evaluated by GI and cardiology. Negative work up. No side effects from medication.   Insomnia - Patient was given ambien by another provider. She did not continue to take this. She only takes as needed.     Objective     ROS  Answers submitted by the patient for this visit:  Review of Systems Questionnaire (Submitted on 4/21/2023)  activity change: No  unexpected weight change: No  rhinorrhea: No  trouble swallowing: No  visual disturbance: No  chest tightness: No  polyuria: No  difficulty urinating: No  menstrual problem: No  joint swelling: No  arthralgias: No  confusion: No  dysphoric mood: No    LMP 02/07/2023   Physical Exam  Constitutional:       Appearance: She is well-developed.   HENT:      Head: Normocephalic and atraumatic.      Right Ear: External ear normal.      Left Ear: External ear normal.   Eyes:      Conjunctiva/sclera: Conjunctivae normal.   Pulmonary:       Effort: Pulmonary effort is normal.   Musculoskeletal:      Cervical back: Normal range of motion.   Skin:     Coloration: Skin is not pale.   Neurological:      Mental Status: She is alert and oriented to person, place, and time.   Psychiatric:         Behavior: Behavior normal.         Thought Content: Thought content normal.         Judgment: Judgment normal.         Plan     Problem List Items Addressed This Visit          Psychiatric    ADHD (attention deficit hyperactivity disorder)    Relevant Medications    dextroamphetamine-amphetamine (ADDERALL XR) 30 MG 24 hr capsule  Reviewed LA   The current medical regimen is effective;  continue present plan and medications.   Will refill at 3 months   F/u in person at 6 months   Reviewed most recent labs          GI    GERD (gastroesophageal reflux disease) - Primary  The current medical regimen is effective;  continue present plan and medications.            Follow up in about 6 months (around 10/21/2023), or if symptoms worsen or fail to improve.

## 2023-05-03 ENCOUNTER — TELEPHONE (OUTPATIENT)
Dept: GASTROENTEROLOGY | Facility: CLINIC | Age: 48
End: 2023-05-03
Payer: COMMERCIAL

## 2023-05-03 NOTE — TELEPHONE ENCOUNTER
----- Message from Jonas Clancy MD sent at 4/19/2023  3:51 PM CDT -----  Acute inflammation noted on biopsies of small intestine. Please arrange clinical f/u

## 2023-05-03 NOTE — TELEPHONE ENCOUNTER
Spoke with patient about the test results. Verbal understanding.        ----- Message from Jonas Clancy MD sent at 4/19/2023  3:51 PM CDT -----  Acute inflammation noted on biopsies of small intestine. Please arrange clinical f/u

## 2023-05-31 ENCOUNTER — PATIENT MESSAGE (OUTPATIENT)
Dept: FAMILY MEDICINE | Facility: CLINIC | Age: 48
End: 2023-05-31

## 2023-05-31 ENCOUNTER — OFFICE VISIT (OUTPATIENT)
Dept: FAMILY MEDICINE | Facility: CLINIC | Age: 48
End: 2023-05-31
Payer: COMMERCIAL

## 2023-05-31 VITALS
SYSTOLIC BLOOD PRESSURE: 132 MMHG | HEIGHT: 65 IN | OXYGEN SATURATION: 99 % | HEART RATE: 73 BPM | TEMPERATURE: 99 F | WEIGHT: 167.69 LBS | DIASTOLIC BLOOD PRESSURE: 76 MMHG | BODY MASS INDEX: 27.94 KG/M2

## 2023-05-31 DIAGNOSIS — E66.3 OVERWEIGHT (BMI 25.0-29.9): ICD-10-CM

## 2023-05-31 DIAGNOSIS — F43.21 FEELING GRIEF: ICD-10-CM

## 2023-05-31 DIAGNOSIS — F90.9 ATTENTION DEFICIT HYPERACTIVITY DISORDER (ADHD), UNSPECIFIED ADHD TYPE: Primary | ICD-10-CM

## 2023-05-31 PROCEDURE — 99214 OFFICE O/P EST MOD 30 MIN: CPT | Mod: S$GLB,,,

## 2023-05-31 PROCEDURE — 99214 PR OFFICE/OUTPT VISIT, EST, LEVL IV, 30-39 MIN: ICD-10-PCS | Mod: S$GLB,,,

## 2023-05-31 PROCEDURE — 99999 PR PBB SHADOW E&M-EST. PATIENT-LVL III: CPT | Mod: PBBFAC,,,

## 2023-05-31 PROCEDURE — 99999 PR PBB SHADOW E&M-EST. PATIENT-LVL III: ICD-10-PCS | Mod: PBBFAC,,,

## 2023-05-31 RX ORDER — LISDEXAMFETAMINE DIMESYLATE CAPSULES 20 MG/1
20 CAPSULE ORAL EVERY MORNING
Qty: 30 CAPSULE | Refills: 0 | Status: SHIPPED | OUTPATIENT
Start: 2023-05-31 | End: 2023-09-26

## 2023-05-31 RX ORDER — LISDEXAMFETAMINE DIMESYLATE CAPSULES 20 MG/1
20 CAPSULE ORAL EVERY MORNING
Qty: 30 CAPSULE | Refills: 0 | Status: SHIPPED | OUTPATIENT
Start: 2023-06-29 | End: 2023-07-28

## 2023-05-31 RX ORDER — LISDEXAMFETAMINE DIMESYLATE 30 MG/1
30 CAPSULE ORAL EVERY MORNING
Qty: 90 CAPSULE | Refills: 0 | Status: CANCELLED | OUTPATIENT
Start: 2023-05-31 | End: 2023-08-29

## 2023-05-31 RX ORDER — LISDEXAMFETAMINE DIMESYLATE CAPSULES 20 MG/1
20 CAPSULE ORAL EVERY MORNING
Qty: 30 CAPSULE | Refills: 0 | Status: SHIPPED | OUTPATIENT
Start: 2023-07-28 | End: 2023-08-26

## 2023-05-31 NOTE — PROGRESS NOTES
HPI     Chief Complaint:  Chief Complaint   Patient presents with    Medication Refill       Fadumo Barnhart is a 48 y.o. female with multiple medical diagnoses as listed in the medical history and problem list that presents for ADHD. Pt is not known to me with her last appointment 4/21/2023.      Medication Refill  Pertinent negatives include no abdominal pain, anorexia, chest pain, fatigue, fever or headaches.   Pt presents for medication refill. Pt has been unable to fill Adderall prescription for 2 months due to shortage. Pt interested in switching to another medication that is available.         Assessment & Plan     Problem List Items Addressed This Visit          Psychiatric    ADHD (attention deficit hyperactivity disorder) - Primary  Pt unable to fill Adderall prescription for the past 2 months. Interested in changing Adderall to another medication. Will change to Vyvanse.     Other Visit Diagnoses       Overweight (BMI 25.0-29.9)     Continue healthier diet and exercise for weight loss.     Feeling grief      Pt with loss of father in November. Has been keeping busy with work which has possibly delayed grieving process. Currently speaks with a therapist about grief.            --------------------------------------------      Health Maintenance:  Health Maintenance         Date Due Completion Date    Cervical Cancer Screening 03/27/2017 3/27/2014 (Declined)    Override on 3/27/2014: Declined (was done last year by OB-GYN 12/2013)    COVID-19 Vaccine (3 - Moderna series) 06/08/2021 4/13/2021    Mammogram 12/15/2023 12/15/2022    TETANUS VACCINE 12/16/2023 12/16/2013    Hemoglobin A1c (Diabetic Prevention Screening) 12/19/2025 12/19/2022    Lipid Panel 12/19/2027 12/19/2022    Colorectal Cancer Screening 03/29/2033 3/29/2023            Health maintenance reviewed    Follow Up:  Follow up in about 3 months (around 8/31/2023).    Exam     Review of Systems:  (as noted above)  Review of Systems  "  Constitutional:  Positive for appetite change. Negative for fatigue and fever.   HENT:  Negative for trouble swallowing.    Eyes:  Negative for visual disturbance.   Respiratory:  Negative for chest tightness and shortness of breath.    Cardiovascular:  Negative for chest pain.   Gastrointestinal:  Negative for abdominal pain, anorexia and blood in stool.   Neurological:  Negative for headaches.   Psychiatric/Behavioral:  Positive for decreased concentration. The patient is nervous/anxious.      Physical Exam:   Physical Exam  Constitutional:       General: She is not in acute distress.     Appearance: She is not ill-appearing or diaphoretic.   HENT:      Head: Normocephalic and atraumatic.   Cardiovascular:      Rate and Rhythm: Normal rate and regular rhythm.      Heart sounds: No murmur heard.    No friction rub. No gallop.   Pulmonary:      Effort: No respiratory distress.   Chest:      Chest wall: No tenderness.   Musculoskeletal:      Cervical back: No rigidity.   Neurological:      Mental Status: She is alert.   Psychiatric:         Mood and Affect: Affect is tearful.     Vitals:    05/31/23 1410   BP: 132/76   BP Location: Right arm   Patient Position: Sitting   BP Method: Large (Manual)   Pulse: 73   Temp: 98.6 °F (37 °C)   TempSrc: Oral   SpO2: 99%   Weight: 76.1 kg (167 lb 10.6 oz)   Height: 5' 5" (1.651 m)      Body mass index is 27.9 kg/m².        History     Past Medical History:  Past Medical History:   Diagnosis Date    ADD (attention deficit disorder with hyperactivity)     ADHD (attention deficit hyperactivity disorder)     Anemia     Anxiety     Back problem     Depression     GERD (gastroesophageal reflux disease)     Hypoglycemia        Past Surgical History:  Past Surgical History:   Procedure Laterality Date    COLONOSCOPY N/A 3/29/2023    Procedure: COLONOSCOPY;  Surgeon: Jonas Clancy MD;  Location: [facility];  Service: Endoscopy;  Laterality: N/A;    ESOPHAGOGASTRODUODENOSCOPY " N/A 3/29/2023    Procedure: EGD (ESOPHAGOGASTRODUODENOSCOPY);  Surgeon: Jonas Clancy MD;  Location: [facility];  Service: Endoscopy;  Laterality: N/A;    GASTRIC BYPASS      LEEP      SMALL INTESTINE SURGERY      intestine were twisted    TONSILLECTOMY      uterine embolization  12/21/2018       Social History:  Social History     Socioeconomic History    Marital status:    Occupational History     Employer: [employer]   Tobacco Use    Smoking status: Never    Smokeless tobacco: Never   Substance and Sexual Activity    Alcohol use: Yes     Comment: OCCASIONAL    Drug use: No    Sexual activity: Yes     Partners: Male       Family History:  Family History   Problem Relation Age of Onset    Diabetes Mother     Heart disease Mother     Hypertension Mother     Kidney disease Mother     Miscarriages / Stillbirths Mother     Vision loss Mother     Hypertension Father     Alcohol abuse Father         prior, not current    Alcohol abuse Brother     Drug abuse Brother     Learning disabilities Brother         ADD/ADHD, dyslexia    Alcohol abuse Paternal Uncle     Cancer Paternal Uncle     Depression Paternal Uncle     Early death Paternal Uncle     Diabetes Maternal Aunt     Miscarriages / Stillbirths Maternal Aunt     Diabetes Maternal Uncle     Kidney disease Maternal Uncle        Allergies and Medications: (updated and reviewed)  Review of patient's allergies indicates:   Allergen Reactions    Aspirin Other (See Comments)     Other reaction(s): Stomach upset    Hydrocodone-acetaminophen Other (See Comments)     Other reaction(s): dizziness    Aspirin Rash     Current Outpatient Medications   Medication Sig Dispense Refill    dextroamphetamine-amphetamine (ADDERALL XR) 30 MG 24 hr capsule Take 1 capsule (30 mg total) by mouth every morning. 90 capsule 0    ibuprofen (ADVIL,MOTRIN) 800 MG tablet TAKE 1 TABLET EVERY 6 HOURS AS NEEDED FOR PAIN 90 tablet 14    multivitamin capsule Take 1  capsule by mouth once daily.      pantoprazole (PROTONIX) 40 MG tablet Take 1 tablet (40 mg total) by mouth once daily. 90 tablet 2    lisdexamfetamine (VYVANSE) 20 MG capsule Take 1 capsule (20 mg total) by mouth every morning. 30 capsule 0    [START ON 6/29/2023] lisdexamfetamine (VYVANSE) 20 MG capsule Take 1 capsule (20 mg total) by mouth every morning. 30 capsule 0    [START ON 7/28/2023] lisdexamfetamine (VYVANSE) 20 MG capsule Take 1 capsule (20 mg total) by mouth every morning. 30 capsule 0    zolpidem (AMBIEN) 5 MG Tab Take 1 tablet (5 mg total) by mouth nightly as needed (insomnia). (Patient not taking: Reported on 5/31/2023) 15 tablet 0     No current facility-administered medications for this visit.       Patient Care Team:  Suze Chi MD as PCP - General (Family Medicine)  Arlin Singer LPN as Licensed Practical Nurse  Marcell Brown Jr., MD as Obstetrician (Obstetrics)         - The patient is given an After Visit Summary that lists all medications with directions, allergies, education, orders placed during this encounter and follow-up instructions.      - I have reviewed the patient's medical information including past medical, family, and social history sections including the medications and allergies.      - We discussed the patient's current medications.     This note was created by combination of typed  and MModal dictation.  Transcription errors may be present.  If there are any questions, please contact me.        Nargis Robles NP

## 2023-05-31 NOTE — PATIENT INSTRUCTIONS
Medical [facility]itness--[phone]  Imaging, Xray, CT, MRI, Ultrasound---[phone]  Bariatrics---[phone]  Breast Surgery---[phone]  Case Management---[phone]  Colonoscopy---[phone]  DME---[phone]  Infectious Disease---[phone]  Interventional Radiology---[phone]  Medical Records---[phone]  [facility]---[phone]  Optometry/Ophthalmology---[phone]  [facility]---[phone]  Physical Therapy---[phone]  Psychiatry---[phone] or [phone]  Plastic Surgery---[phone]  Recovery--[phone] option 2, or [phone].  Sleep Study---[phone]  Smoking Cessation---[phone]  Wound Care---[phone]  Referral Desk---[phone]

## 2023-09-23 ENCOUNTER — IMMUNIZATION (OUTPATIENT)
Dept: OBSTETRICS AND GYNECOLOGY | Facility: CLINIC | Age: 48
End: 2023-09-23
Payer: COMMERCIAL

## 2023-09-23 DIAGNOSIS — Z23 NEED FOR INFLUENZA VACCINATION: Primary | ICD-10-CM

## 2023-09-23 PROCEDURE — 90471 FLU VACCINE (QUAD) GREATER THAN OR EQUAL TO 3YO PRESERVATIVE FREE IM: ICD-10-PCS | Mod: S$GLB,,, | Performed by: FAMILY MEDICINE

## 2023-09-23 PROCEDURE — 90686 IIV4 VACC NO PRSV 0.5 ML IM: CPT | Mod: S$GLB,,, | Performed by: FAMILY MEDICINE

## 2023-09-23 PROCEDURE — 90686 FLU VACCINE (QUAD) GREATER THAN OR EQUAL TO 3YO PRESERVATIVE FREE IM: ICD-10-PCS | Mod: S$GLB,,, | Performed by: FAMILY MEDICINE

## 2023-09-23 PROCEDURE — 90471 IMMUNIZATION ADMIN: CPT | Mod: S$GLB,,, | Performed by: FAMILY MEDICINE

## 2023-09-26 ENCOUNTER — OFFICE VISIT (OUTPATIENT)
Dept: FAMILY MEDICINE | Facility: CLINIC | Age: 48
End: 2023-09-26
Payer: COMMERCIAL

## 2023-09-26 VITALS
BODY MASS INDEX: 29.84 KG/M2 | SYSTOLIC BLOOD PRESSURE: 128 MMHG | HEIGHT: 65 IN | HEART RATE: 70 BPM | DIASTOLIC BLOOD PRESSURE: 80 MMHG | OXYGEN SATURATION: 99 % | WEIGHT: 179.13 LBS | TEMPERATURE: 99 F

## 2023-09-26 DIAGNOSIS — Z76.0 MEDICATION REFILL: Primary | ICD-10-CM

## 2023-09-26 DIAGNOSIS — E66.3 OVERWEIGHT (BMI 25.0-29.9): ICD-10-CM

## 2023-09-26 DIAGNOSIS — F90.9 ATTENTION DEFICIT HYPERACTIVITY DISORDER (ADHD), UNSPECIFIED ADHD TYPE: ICD-10-CM

## 2023-09-26 PROCEDURE — 99999 PR PBB SHADOW E&M-EST. PATIENT-LVL III: CPT | Mod: PBBFAC,,,

## 2023-09-26 PROCEDURE — 99999 PR PBB SHADOW E&M-EST. PATIENT-LVL III: ICD-10-PCS | Mod: PBBFAC,,,

## 2023-09-26 PROCEDURE — 99214 PR OFFICE/OUTPT VISIT, EST, LEVL IV, 30-39 MIN: ICD-10-PCS | Mod: S$GLB,,,

## 2023-09-26 PROCEDURE — 99214 OFFICE O/P EST MOD 30 MIN: CPT | Mod: S$GLB,,,

## 2023-09-26 RX ORDER — DEXTROAMPHETAMINE SACCHARATE, AMPHETAMINE ASPARTATE MONOHYDRATE, DEXTROAMPHETAMINE SULFATE AND AMPHETAMINE SULFATE 7.5; 7.5; 7.5; 7.5 MG/1; MG/1; MG/1; MG/1
30 CAPSULE, EXTENDED RELEASE ORAL EVERY MORNING
Qty: 90 CAPSULE | Refills: 0 | Status: SHIPPED | OUTPATIENT
Start: 2023-09-26

## 2023-09-26 NOTE — PROGRESS NOTES
HPI     Chief Complaint:  Chief Complaint   Patient presents with    Medication Refill       Fadumo Barnhart is a 48 y.o. female with multiple medical diagnoses as listed in the medical history and problem list that presents for medication refill.  Pt is known to me with her last appointment 5/31/2023.      HPI  Pt presents for medication refill. Pt has been out of Adderall for several months. Pt is about to be busy with work so would like to restart Adderall.       Assessment & Plan     Problem List Items Addressed This Visit          Psychiatric    ADHD (attention deficit hyperactivity disorder)  Has been out of Adderall for several months due to shortage. Requesting refill on Adderall prescription as she will increase workload soon and needs to be focused.     Other Visit Diagnoses       Medication refill    -  Primary  Has been out of Adderall for several months due to shortage. Requesting refill on Adderall prescription as she will increase workload soon and needs to be focused. Will defer Adderall refill to PCP. Pt to follow with PCP in person in 6 months.    Overweight (BMI 25.0-29.9)      Continue healthy diet and exercise for weight loss.              --------------------------------------------      Health Maintenance:  Health Maintenance         Date Due Completion Date    Cervical Cancer Screening 03/27/2017 3/27/2014 (Declined)    Override on 3/27/2014: Declined (was done last year by OB-GYN 12/2013)    COVID-19 Vaccine (3 - Moderna series) 06/08/2021 4/13/2021    Mammogram 12/15/2023 12/15/2022    TETANUS VACCINE 12/16/2023 12/16/2013    Hemoglobin A1c (Diabetic Prevention Screening) 12/19/2025 12/19/2022    Lipid Panel 12/19/2027 12/19/2022    Colorectal Cancer Screening 03/29/2033 3/29/2023            Health maintenance reviewed    Follow Up:  No follow-ups on file.    Exam     Review of Systems:  (as noted above)  Review of Systems   Constitutional:  Negative for fever.   HENT:  Negative for  "trouble swallowing.    Eyes:  Negative for visual disturbance.   Respiratory:  Negative for chest tightness and shortness of breath.    Cardiovascular:  Negative for chest pain.   Gastrointestinal:  Negative for blood in stool.       Physical Exam:   Physical Exam  Constitutional:       General: She is not in acute distress.     Appearance: She is not ill-appearing or diaphoretic.   HENT:      Head: Normocephalic and atraumatic.   Cardiovascular:      Rate and Rhythm: Normal rate and regular rhythm.      Heart sounds: No murmur heard.     No friction rub. No gallop.   Pulmonary:      Effort: No respiratory distress.   Chest:      Chest wall: No tenderness.   Musculoskeletal:      Cervical back: No rigidity.   Neurological:      Mental Status: She is alert and oriented to person, place, and time.       Vitals:    09/26/23 1541   BP: 128/80   Pulse: 70   Temp: 99 °F (37.2 °C)   TempSrc: Oral   SpO2: 99%   Weight: 81.3 kg (179 lb 2 oz)   Height: 5' 5" (1.651 m)      Body mass index is 29.81 kg/m².        History     Past Medical History:  Past Medical History:   Diagnosis Date    ADD (attention deficit disorder with hyperactivity)     ADHD (attention deficit hyperactivity disorder)     Anemia     Anxiety     Back problem     Depression     GERD (gastroesophageal reflux disease)     Hypoglycemia        Past Surgical History:  Past Surgical History:   Procedure Laterality Date    COLONOSCOPY N/A 3/29/2023    Procedure: COLONOSCOPY;  Surgeon: Jonas Clancy MD;  Location: [facility];  Service: Endoscopy;  Laterality: N/A;    ESOPHAGOGASTRODUODENOSCOPY N/A 3/29/2023    Procedure: EGD (ESOPHAGOGASTRODUODENOSCOPY);  Surgeon: Jonas Clancy MD;  Location: [facility];  Service: Endoscopy;  Laterality: N/A;    GASTRIC BYPASS      LEEP      SMALL INTESTINE SURGERY      intestine were twisted    TONSILLECTOMY      uterine embolization  12/21/2018       Social History:  Social History     Socioeconomic History    " Marital status:    Occupational History     Employer: [employer]   Tobacco Use    Smoking status: Never    Smokeless tobacco: Never   Substance and Sexual Activity    Alcohol use: Yes     Comment: OCCASIONAL    Drug use: No    Sexual activity: Yes     Partners: Male     Social Determinants of Health     Stress: No Stress Concern Present (5/18/2020)    [facility] - Occupational Stress Questionnaire     Feeling of Stress : Only a little       Family History:  Family History   Problem Relation Age of Onset    Diabetes Mother     Heart disease Mother     Hypertension Mother     Kidney disease Mother     Miscarriages / Stillbirths Mother     Vision loss Mother     Hypertension Father     Alcohol abuse Father         prior, not current    Alcohol abuse Brother     Drug abuse Brother     Learning disabilities Brother         ADD/ADHD, dyslexia    Alcohol abuse Paternal Uncle     Cancer Paternal Uncle     Depression Paternal Uncle     Early death Paternal Uncle     Diabetes Maternal Aunt     Miscarriages / Stillbirths Maternal Aunt     Diabetes Maternal Uncle     Kidney disease Maternal Uncle        Allergies and Medications: (updated and reviewed)  Review of patient's allergies indicates:   Allergen Reactions    Aspirin Other (See Comments)     Other reaction(s): Stomach upset    Hydrocodone-acetaminophen Other (See Comments)     Other reaction(s): dizziness    Aspirin Rash     Current Outpatient Medications   Medication Sig Dispense Refill    dextroamphetamine-amphetamine (ADDERALL XR) 30 MG 24 hr capsule Take 1 capsule (30 mg total) by mouth every morning. 90 capsule 0    ibuprofen (ADVIL,MOTRIN) 800 MG tablet TAKE 1 TABLET EVERY 6 HOURS AS NEEDED FOR PAIN 90 tablet 14    multivitamin capsule Take 1 capsule by mouth once daily.      pantoprazole (PROTONIX) 40 MG tablet Take 1 tablet (40 mg total) by mouth once daily. 90 tablet 2    lisdexamfetamine (VYVANSE) 20 MG  capsule Take 1 capsule (20 mg total) by mouth every morning. (Patient not taking: Reported on 9/26/2023) 30 capsule 0    zolpidem (AMBIEN) 5 MG Tab Take 1 tablet (5 mg total) by mouth nightly as needed (insomnia). (Patient not taking: Reported on 5/31/2023) 15 tablet 0     No current facility-administered medications for this visit.       Patient Care Team:  Suze Chi MD as PCP - General (Family Medicine)  Arlin Singer LPN as Licensed Practical Nurse  Marcell Brown Jr., MD as Obstetrician (Obstetrics)         - The patient is given an After Visit Summary that lists all medications with directions, allergies, education, orders placed during this encounter and follow-up instructions.      - I have reviewed the patient's medical information including past medical, family, and social history sections including the medications and allergies.      - We discussed the patient's current medications.     This note was created by combination of typed  and MModal dictation.  Transcription errors may be present.  If there are any questions, please contact me.       Nargis Robles NP

## 2023-09-26 NOTE — PATIENT INSTRUCTIONS
[facility]--[phone]  Imaging, Xray, CT, MRI, Ultrasound---[phone]  Bariatrics---[phone]  Breast Surgery---[phone]  Case Management---[phone]  Colonoscopy---[phone]  DME---[phone]  Infectious Disease---[phone]  Interventional Radiology---[phone]  Medical Records---[phone]  [facility]---[phone]  Optometry/Ophthalmology---[phone]  [facility]---[phone]  Physical Therapy---[phone]  Psychiatry---[phone] or [phone]  Plastic Surgery---[phone]  Recovery--[phone] option 2, or [phone].  Sleep Study---[phone]  Smoking Cessation---[phone]  Wound Care---[phone]  Referral Desk---[phone]

## 2023-10-12 NOTE — PROGRESS NOTES
Routine Office Visit    Patient Name: Fadumo Barnhart    : 1975  MRN: [id number]    Subjective:  Fadumo is a 43 y.o. female who presents today for     1. 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 She does not abuse medication. She does not take more than prescribed. She denies any side effect from medications such as weight loss, hypertension, chest pain, heart palpitations or anxiety. 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 She feels she may need an adjustment in her medication at this time as it is causing decreased appetite. She has had some weight loss which she is happy with but does not want to lose more.     Review of Systems   Constitutional: Negative for chills and fever.   HENT: Negative for congestion.    Eyes: Negative for blurred vision.   Respiratory: Negative for cough.    Cardiovascular: Negative for chest pain.   Gastrointestinal: Negative for abdominal pain, constipation, diarrhea, heartburn, nausea and vomiting.   Genitourinary: Negative for dysuria.   Musculoskeletal: Negative for myalgias.   Skin: Negative for itching and rash.   Neurological: Negative for dizziness and headaches.   Psychiatric/Behavioral: Negative for depression.       Active Problem List  Patient Active Problem List   Diagnosis    ADHD (attention deficit hyperactivity disorder)    GERD (gastroesophageal reflux disease)       Past Surgical History  Past Surgical History:   Procedure Laterality Date    GASTRIC BYPASS      LEEP      SMALL INTESTINE SURGERY      intestine were twisted    TONSILLECTOMY         Family History  Family History   Problem Relation Age of Onset    Diabetes Mother     Heart disease Mother     Hypertension Mother     Kidney disease Mother     Miscarriages / Stillbirths Mother     Vision loss Mother      Hypertension Father     Alcohol abuse Father         prior, not current    Alcohol abuse Brother     Drug abuse Brother     Learning disabilities Brother         ADD/ADHD, dyslexia    Alcohol abuse Paternal Uncle     Cancer Paternal Uncle     Depression Paternal Uncle     Early death Paternal Uncle     Diabetes Maternal Aunt     Miscarriages / Stillbirths Maternal Aunt     Diabetes Maternal Uncle     Kidney disease Maternal Uncle        Social History  Social History     Socioeconomic History    Marital status:      Spouse name: Not on file    Number of children: Not on file    Years of education: Not on file    Highest education level: Not on file   Social Needs    Financial resource strain: Not on file    Food insecurity - worry: Not on file    Food insecurity - inability: Not on file    Transportation needs - medical: Not on file    Transportation needs - non-medical: Not on file   Occupational History     Employer: [employer]   Tobacco Use    Smoking status: Never Smoker    Smokeless tobacco: Never Used   Substance and Sexual Activity    Alcohol use: Yes    Drug use: No    Sexual activity: Yes     Partners: Male   Other Topics Concern    Not on file   Social History Narrative    Not on file       Medications and Allergies  Reviewed and updated.   Current Outpatient Medications   Medication Sig    ibuprofen (ADVIL,MOTRIN) 800 MG tablet Take 1 tablet (800 mg total) by mouth 3 (three) times daily.    meclizine (ANTIVERT) 12.5 mg tablet     pantoprazole (PROTONIX) 40 MG tablet Take 1 tablet (40 mg total) by mouth once daily.    [START ON 12/23/2018] dextroamphetamine-amphetamine (ADDERALL XR) 20 MG 24 hr capsule Take 1 capsule (20 mg total) by mouth every morning.    [START ON 11/24/2018] dextroamphetamine-amphetamine (ADDERALL XR) 20 MG 24 hr capsule Take 1 capsule (20 mg total) by mouth every morning.    [START ON 1/22/2019] dextroamphetamine-amphetamine  "(ADDERALL XR) 20 MG 24 hr capsule Take 1 capsule (20 mg total) by mouth every morning.    esomeprazole (NEXIUM) 20 MG capsule Take 1 capsule (20 mg total) by mouth before breakfast.    LORazepam (ATIVAN) 0.5 MG tablet Take 1 tablet (0.5 mg total) by mouth daily as needed for Anxiety.     No current facility-administered medications for this visit.        Physical Exam  /80 (BP Location: Left arm, Patient Position: Sitting, BP Method: Medium (Manual))   Pulse 72   Temp 98.2 °F (36.8 °C) (Oral)   Ht 5' 4" (1.626 m)   Wt 78.3 kg (172 lb 9.9 oz)   SpO2 99%   BMI 29.63 kg/m²   Physical Exam   Constitutional: She is oriented to person, place, and time. She appears well-developed and well-nourished.   HENT:   Head: Normocephalic and atraumatic.   Eyes: Conjunctivae and EOM are normal. Pupils are equal, round, and reactive to light.   Neck: Normal range of motion. Neck supple.   Cardiovascular: Normal rate, regular rhythm and normal heart sounds. Exam reveals no gallop and no friction rub.   No murmur heard.  Pulmonary/Chest: Breath sounds normal. No respiratory distress.   Abdominal: Soft. Bowel sounds are normal. She exhibits no distension. There is no tenderness.   Musculoskeletal: Normal range of motion.   Lymphadenopathy:     She has no cervical adenopathy.   Neurological: She is alert and oriented to person, place, and time.   Skin: Skin is warm.   Psychiatric: She has a normal mood and affect.         Assessment/Plan:  Fadumo Barnhart is a 43 y.o. female who presents today for :    Problem List Items Addressed This Visit        Psychiatric    ADHD (attention deficit hyperactivity disorder) - Primary    Relevant Medications    dextroamphetamine-amphetamine (ADDERALL XR) 20 MG 24 hr capsule (Start on 12/23/2018)    dextroamphetamine-amphetamine (ADDERALL XR) 20 MG 24 hr capsule (Start on 11/24/2018)  dextroamphetamine-amphetamine (ADDERALL XR) 20 MG 24 hr capsule (Start on 01/22/2019)  Reviewed [place] "   The current medical regimen is effective;  continue present plan and medications.  Decrease from adderall 25 to 20mg   Monitor for side effects.        GI    GERD (gastroesophageal reflux disease)  The current medical regimen is effective;  continue present plan and medications.              Follow-up in about 3 months (around 2/19/2019).     independent

## 2023-11-15 NOTE — ED PROVIDER NOTES
"Encounter Date: 1/13/2023    SCRIBE #1 NOTE: I, Yolie Anton, am scribing for, and in the presence of,  Chacha Bell DO. I have scribed the following portions of the note - Other sections scribed: HPI; ROS; PE.     History     Chief Complaint   Patient presents with    Dizziness     Intermittent elevated heart rate over past week- max 120, and dizziness today     Fadumo Barnhart is a 47 y.o. female with Hx of Hypoglycemia who presents to the ED for chief complaint of dizziness and tachycardia onset today and intermittent headache onset 1 week ago. Patient reports she was feeling dizzy and noticed her heart rate was 118. She describes the dizziness as "not the spins, but feeling uneasy." Patient reports she did eat breakfast and lunch today. She notes new stressors in life. Patient denies associated swelling, chest pain, shortness of breath, syncope, dysuria, hematuria, or abdominal pain. No further complaints at this time. She states she takes Adderall as needed and has taken it this week. Patient does not have a FHx of CAD. She states she does occasionally drink wine, but does not use tobacco or EtOH.     The history is provided by the patient. No  was used.   Review of patient's allergies indicates:   Allergen Reactions    Aspirin Other (See Comments)     Other reaction(s): Stomach upset    Hydrocodone-acetaminophen Other (See Comments)     Other reaction(s): dizziness    Aspirin Rash     Past Medical History:   Diagnosis Date    ADD (attention deficit disorder with hyperactivity)     ADHD (attention deficit hyperactivity disorder)     Anemia     Anxiety     Back problem     Depression     GERD (gastroesophageal reflux disease)     Hypoglycemia      Past Surgical History:   Procedure Laterality Date    GASTRIC BYPASS      LEEP      SMALL INTESTINE SURGERY      intestine were twisted    TONSILLECTOMY      uterine embolization  12/21/2018     Family History   Problem Relation Age " of Onset    Diabetes Mother     Heart disease Mother     Hypertension Mother     Kidney disease Mother     Miscarriages / Stillbirths Mother     Vision loss Mother     Hypertension Father     Alcohol abuse Father         prior, not current    Alcohol abuse Brother     Drug abuse Brother     Learning disabilities Brother         ADD/ADHD, dyslexia    Alcohol abuse Paternal Uncle     Cancer Paternal Uncle     Depression Paternal Uncle     Early death Paternal Uncle     Diabetes Maternal Aunt     Miscarriages / Stillbirths Maternal Aunt     Diabetes Maternal Uncle     Kidney disease Maternal Uncle      Social History     Tobacco Use    Smoking status: Never    Smokeless tobacco: Never   Substance Use Topics    Alcohol use: Yes     Comment: OCCASIONAL    Drug use: No     Review of Systems   Constitutional:  Negative for chills and fever.   HENT:  Negative for drooling, facial swelling and trouble swallowing.    Eyes:  Negative for redness and visual disturbance.   Respiratory:  Negative for cough, shortness of breath and stridor.    Cardiovascular:  Negative for chest pain and leg swelling.        Positive for elevated heart rate.    Gastrointestinal:  Negative for abdominal pain, nausea and vomiting.   Genitourinary:  Negative for dysuria and hematuria.   Musculoskeletal:  Negative for gait problem and neck stiffness.   Skin:  Negative for pallor and wound.   Neurological:  Positive for dizziness and headaches. Negative for syncope, facial asymmetry, speech difficulty and weakness.   Psychiatric/Behavioral:  Negative for confusion.    All other systems reviewed and are negative.    Physical Exam     Initial Vitals [01/13/23 1357]   BP Pulse Resp Temp SpO2   (!) 146/89 106 20 98 °F (36.7 °C) 100 %      MAP       --         Physical Exam    Nursing note and vitals reviewed.  Constitutional: She appears well-developed and well-nourished. She is not diaphoretic.  Non-toxic appearance. She does not have a sickly appearance.  She does not appear ill.   HENT:   Head: Normocephalic and atraumatic.   Right Ear: External ear normal.   Left Ear: External ear normal.   Nose: Nose normal.   Mouth/Throat: Oropharynx is clear and moist.   Eyes: Conjunctivae are normal. No scleral icterus.   Neck: Neck supple. No JVD present.   Normal range of motion.  Cardiovascular:  Normal rate, regular rhythm, normal heart sounds and intact distal pulses.     Exam reveals no gallop and no friction rub.       No murmur heard.  Pulmonary/Chest: Breath sounds normal. No respiratory distress. She has no wheezes. She has no rhonchi. She has no rales.   Abdominal: Abdomen is soft. Bowel sounds are normal. She exhibits no distension. There is no abdominal tenderness. There is no rebound, no guarding, no tenderness at McBurney's point and negative Rojas's sign.   Musculoskeletal:         General: No edema. Normal range of motion.      Cervical back: Normal range of motion and neck supple.     Lymphadenopathy:     She has no cervical adenopathy.   Neurological: She is alert. She has normal strength. She displays no atrophy and no tremor. No cranial nerve deficit or sensory deficit. She exhibits normal muscle tone. She displays no seizure activity. Gait normal.   Moves all extremities, follows all commands, no focal neurologic deficits.      Skin: Skin is warm and dry.   Psychiatric: She has a normal mood and affect.       ED Course   Procedures  Labs Reviewed   POCT GLUCOSE - Abnormal; Notable for the following components:       Result Value    POCT Glucose 58 (*)     All other components within normal limits   POCT LIVER PANEL - Abnormal; Notable for the following components:    Protein, POC 8.2 (*)     All other components within normal limits   POCT CMP - Abnormal; Notable for the following components:    POC Glucose 42 (*)     Protein, POC 8.2 (*)     All other components within normal limits   POCT URINALYSIS W/O SCOPE   POCT URINE PREGNANCY   POCT CBC   POCT  URINALYSIS W/O SCOPE   POCT CMP   POCT LIVER PANEL   POCT GLUCOSE   POCT GLUCOSE   POCT GLUCOSE       EKG Readings: (Independently Interpreted)   Normal sinus rhythm with a rate of 94 beats per minute, normal axis and intervals, nonspecific T-wave inversions in leads V1 with T-wave flattening in lead 3.  No acute ST segment changes.  No old EKG in epic to compare.     ECG Results              EKG 12-lead (Final result)  Result time 01/13/23 14:35:46      Final result by Interface, Lab In [facility] (01/13/23 14:35:46)                   Narrative:    Test Reason : R42,    Vent. Rate : 094 BPM     Atrial Rate : 094 BPM     P-R Int : 146 ms          QRS Dur : 070 ms      QT Int : 348 ms       P-R-T Axes : 076 019 041 degrees     QTc Int : 435 ms    Normal sinus rhythm  Normal ECG  No previous ECGs available  Confirmed by Cesar Cardoza MD ([id number]) on 1/13/2023 2:35:38 PM    Referred By:             Confirmed By:Cesar Cardoza MD                                  Imaging Results              X-Ray Chest PA And Lateral (Final result)  Result time 01/13/23 15:53:04      Final result by Azam De La Garza MD (01/13/23 15:53:04)                   Impression:      Normal radiographic appearance of the chest.      Electronically signed by: Azam De La Garza MD  Date:    01/13/2023  Time:    15:53               Narrative:    EXAMINATION:  XR CHEST PA AND LATERAL    CLINICAL HISTORY:  Tachycardia, unspecified    TECHNIQUE:  PA and lateral views of the chest were performed.    COMPARISON:  Thoracic and lumbar spine series 01/09/2021, abdominal series 06/04/2015    FINDINGS:  Lower trachea slightly deviated to the right likely secondary to the arch.The lungs are well expanded and clear, with normal appearance of pulmonary vasculature and no pleural effusion or pneumothorax.    The cardiac silhouette is normal in size. The hilar and mediastinal contours are unremarkable.    Bones are intact. Left upper quadrant surgical clips again noted.                                        Medications - No data to display  Medical Decision Making:   Clinical Tests:   Lab Tests: Ordered and Reviewed  Radiological Study: Ordered and Reviewed  Medical Tests: Ordered and Reviewed  ED Management:   MDM  This is an emergent evaluation of a 47 y.o. F with Hx of Hypoglycemia who presents to the ED for chief complaint of dizziness and tachycardia onset today and intermittent headache onset 1 week ago. Initial vitals in the ED  [01/13/23 1357]    BP: (!) 146/89  Pulse: 106  Resp: 20  Temp: 98 °F (36.7 °C)  SpO2: 100 % .     Physical exam noted above. DDx includes but is not limited to hypoglycemia, electrolyte abnormality, dehydration, anemia, arrhythmia. Also considered but clinically less likely to be stroke, ACS, PE. Will obtain labs and imaging including CBC, CMP, UA, POC glucose, . Will also provide IV dextrose. Will continue to monitor and frequently reassess pending results of labs, treatments and final disposition.    Patient is aware of plan and is amenable.     Chacha Bell D.O  EMERGENCY MEDICINE  3:56 PM 01/13/2023      UPDATE:  I ordered labs, chest xrays, EKG and personally reviewed them. Labs reveal hypoglycemia. Chest xray and EKG unremarkable. Patient was managed in the ED with IV Dextrose and PO juice. The response to treatment was improved symptoms. Repeat POC glucose within normal limits. She was able to tolerate PO. Vitals reassuring. Patient was discharged in stable condition with cardiology follow-up and detailed return precaution were discussed. Patient aware and agreeable to the plan.       This chart was completed using dictation software, as a result there may be some transcription errors         Scribe Attestation:   Scribe #1: I performed the above scribed service and the documentation accurately describes the services I performed. I attest to the accuracy of the note.            Scribe attestation: I, Chacha Bell DO  ,  personally performed the services described in this documentation.  All medical record entries made by the scribe were at my direction and in my presence.  I have reviewed the chart and agree that the record reflects my personal performance and is accurate and complete.         Clinical Impression:   Final diagnoses:  [R00.0] Increased heart rate  [E16.2] Hypoglycemia (Primary)        ED Disposition Condition    Discharge Stable          ED Prescriptions    None       Follow-up Information       Follow up With Specialties Details Why Contact Info    Suze Chi MD Family Medicine, Wound Care Schedule an appointment as soon as possible for a visit on 1/16/2023 Emergency Room Follow-up [address] [facility] [address]  [phone]      [facility] ED Emergency Medicine Go to  If symptoms worsen [address] [facility] [address] [phone]    [facility] CARDIOLOGY Cardiology Schedule an appointment as soon as possible for a visit in 1 week Emergency Room Follow-up [address] [facility] [address]  [phone]             Chacha Bell,   01/15/23 1376     Isotretinoin Pregnancy And Lactation Text: This medication is Pregnancy Category X and is considered extremely dangerous during pregnancy. It is unknown if it is excreted in breast milk.

## 2024-04-08 ENCOUNTER — TELEPHONE (OUTPATIENT)
Dept: FAMILY MEDICINE | Facility: CLINIC | Age: 49
End: 2024-04-08
Payer: COMMERCIAL

## 2024-04-08 NOTE — TELEPHONE ENCOUNTER
----- Message from Consuelo Surendra sent at 4/8/2024  1:14 PM CDT -----  Regarding: Self [phone]  Type: Patient Call Back     Who called:     What is the request in detail: Pt is requesting an order to be put in T-dap, Screening for TB, and refill on dextroamphetamine-amphetamine (ADDERALL XR) 30 MG 24 hr capsule. Pt states she is out of the [facility] - [place] [address]  Phone: [phone] Fax: [phone]    Can the clinic reply by [facility]? No     Would the patient rather a call back or a response via [facility]? Call back     Best call back number: [phone]      Additional Information:

## 2024-04-08 NOTE — TELEPHONE ENCOUNTER
----- Message from Consuelo Surendra sent at 4/8/2024  1:14 PM CDT -----  Regarding: Self [phone]  Type: Patient Call Back     Who called:     What is the request in detail: Pt is requesting an order to be put in T-dap, Screening for TB, and refill on dextroamphetamine-amphetamine (ADDERALL XR) 30 MG 24 hr capsule. Pt states she is out of the [facility] - [place] [address]  Phone: [phone] Fax: [phone]    Can the clinic reply by [facility]? No     Would the patient rather a call back or a response via [facility]? Call back     Best call back number: [phone]      Additional Information:

## 2024-04-09 ENCOUNTER — PATIENT MESSAGE (OUTPATIENT)
Dept: FAMILY MEDICINE | Facility: CLINIC | Age: 49
End: 2024-04-09
Payer: COMMERCIAL

## 2024-04-09 DIAGNOSIS — Z00.00 ROUTINE HEALTH MAINTENANCE: Primary | ICD-10-CM

## 2024-04-10 ENCOUNTER — PATIENT OUTREACH (OUTPATIENT)
Dept: ADMINISTRATIVE | Facility: HOSPITAL | Age: 49
End: 2024-04-10
Payer: COMMERCIAL

## 2024-04-15 ENCOUNTER — OFFICE VISIT (OUTPATIENT)
Dept: FAMILY MEDICINE | Facility: CLINIC | Age: 49
End: 2024-04-15
Payer: COMMERCIAL

## 2024-04-15 VITALS
DIASTOLIC BLOOD PRESSURE: 80 MMHG | TEMPERATURE: 99 F | HEART RATE: 68 BPM | WEIGHT: 166.25 LBS | SYSTOLIC BLOOD PRESSURE: 120 MMHG | BODY MASS INDEX: 27.66 KG/M2 | OXYGEN SATURATION: 99 %

## 2024-04-15 DIAGNOSIS — F90.9 ATTENTION DEFICIT HYPERACTIVITY DISORDER (ADHD), UNSPECIFIED ADHD TYPE: ICD-10-CM

## 2024-04-15 DIAGNOSIS — E66.3 OVERWEIGHT (BMI 25.0-29.9): ICD-10-CM

## 2024-04-15 DIAGNOSIS — Z23 NEED FOR DIPHTHERIA-TETANUS-PERTUSSIS (TDAP) VACCINE: ICD-10-CM

## 2024-04-15 DIAGNOSIS — M54.50 BILATERAL LOW BACK PAIN WITHOUT SCIATICA, UNSPECIFIED CHRONICITY: ICD-10-CM

## 2024-04-15 DIAGNOSIS — Z00.00 ANNUAL PHYSICAL EXAM: Primary | ICD-10-CM

## 2024-04-15 DIAGNOSIS — Z02.0 SCHOOL PHYSICAL EXAM: ICD-10-CM

## 2024-04-15 PROCEDURE — 90471 IMMUNIZATION ADMIN: CPT | Mod: S$GLB,,, | Performed by: FAMILY MEDICINE

## 2024-04-15 PROCEDURE — 99999 PR PBB SHADOW E&M-EST. PATIENT-LVL III: CPT | Mod: PBBFAC,,, | Performed by: FAMILY MEDICINE

## 2024-04-15 PROCEDURE — 90715 TDAP VACCINE 7 YRS/> IM: CPT | Mod: S$GLB,,, | Performed by: FAMILY MEDICINE

## 2024-04-15 PROCEDURE — 99396 PREV VISIT EST AGE 40-64: CPT | Mod: 25,S$GLB,, | Performed by: FAMILY MEDICINE

## 2024-04-15 RX ORDER — IBUPROFEN 800 MG/1
800 TABLET ORAL EVERY 6 HOURS PRN
Qty: 90 TABLET | Refills: 1 | Status: SHIPPED | OUTPATIENT
Start: 2024-04-15

## 2024-04-15 RX ORDER — DEXTROAMPHETAMINE SACCHARATE, AMPHETAMINE ASPARTATE MONOHYDRATE, DEXTROAMPHETAMINE SULFATE AND AMPHETAMINE SULFATE 7.5; 7.5; 7.5; 7.5 MG/1; MG/1; MG/1; MG/1
30 CAPSULE, EXTENDED RELEASE ORAL EVERY MORNING
Qty: 90 CAPSULE | Refills: 0 | Status: SHIPPED | OUTPATIENT
Start: 2024-04-15

## 2024-04-15 NOTE — PROGRESS NOTES
Routine Office Visit    Fadumo Barnhart  1975  [id number]      Subjective     Fadumo is a 48 y.o. female who presents today for:    Annual physical   ADHD - Patient is on adderall. She took a year off from school with passing of her father. 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 She has had some weight loss - due to grieving. No side effects from medication     Objective     Review of Systems   Constitutional:  Negative for chills and fever.   HENT:  Negative for congestion.    Eyes:  Negative for blurred vision.   Respiratory:  Negative for cough.    Cardiovascular:  Negative for chest pain.   Gastrointestinal:  Negative for abdominal pain, constipation, diarrhea, heartburn, nausea and vomiting.   Genitourinary:  Negative for dysuria.   Musculoskeletal:  Negative for myalgias.   Skin:  Negative for itching and rash.   Neurological:  Negative for dizziness and headaches.   Psychiatric/Behavioral:  Negative for depression.      /80 (BP Location: Left arm, Patient Position: Sitting, BP Method: Medium (Manual))   Pulse 68   Temp 98.9 °F (37.2 °C) (Oral)   Wt 75.4 kg (166 lb 3.6 oz)   LMP 04/14/2024   SpO2 99%   BMI 27.66 kg/m²   Physical Exam  Constitutional:       Appearance: She is well-developed.   HENT:      Head: Normocephalic and atraumatic.   Eyes:      Conjunctiva/sclera: Conjunctivae normal.      Pupils: Pupils are equal, round, and reactive to light.   Cardiovascular:      Rate and Rhythm: Normal rate and regular rhythm.      Heart sounds: Normal heart sounds. No murmur heard.     No friction rub. No gallop.   Pulmonary:      Effort: No respiratory distress.      Breath sounds: Normal breath sounds.   Abdominal:      General: Bowel sounds are normal. There is no distension.      Palpations: Abdomen is soft.      Tenderness: There is no abdominal tenderness.   Musculoskeletal:         General: Normal range of motion.      Cervical back: Normal range of motion and neck supple.   Lymphadenopathy:       Cervical: No cervical adenopathy.   Skin:     General: Skin is warm.   Neurological:      Mental Status: She is alert and oriented to person, place, and time.             Assessment     Health Maintenance         Date Due Completion Date    Cervical Cancer Screening 03/27/2017 3/27/2014 (Declined)    Override on 3/27/2014: Declined (was done last year by OB-GYN 12/2013)    COVID-19 Vaccine (3 - 2023-24 season) 09/01/2023 4/13/2021    Mammogram 12/15/2023 12/15/2022    TETANUS VACCINE 12/16/2023 12/16/2013    Hemoglobin A1c (Diabetic Prevention Screening) 12/19/2025 12/19/2022    Lipid Panel 12/19/2027 12/19/2022    Colorectal Cancer Screening 03/29/2033 3/29/2023              Problem List Items Addressed This Visit          Psychiatric    ADHD (attention deficit hyperactivity disorder)    Relevant Medications    dextroamphetamine-amphetamine (ADDERALL XR) 30 MG 24 hr capsule  The current medical regimen is effective;  continue present plan and medications.        Other Visit Diagnoses       Annual physical exam    -  Primary  I addressed all major concerns as it related to health maintenance.  All were ordered and scheduled based on the patients wishes.  Any additional health maintenance will be readdressed at the next physical if declined or deferred by the patient.       Bilateral low back pain without sciatica, unspecified chronicity        Relevant Medications    ibuprofen (ADVIL,MOTRIN) 800 MG tablet    School physical exam        Relevant Orders    QUANTIFERON GOLD TB    Overweight (BMI 25.0-29.9)      Continue to improve diet and exercise patterns to aid in medical management of this problem.       Need for diphtheria-tetanus-pertussis (Tdap) vaccine        Relevant Orders    (In Office Administered) Tdap Vaccine                  Follow up in about 1 year (around 4/15/2025), or if symptoms worsen or fail to improve, for yearly exam.

## 2024-04-22 DIAGNOSIS — K21.9 GASTROESOPHAGEAL REFLUX DISEASE WITHOUT ESOPHAGITIS: ICD-10-CM

## 2024-04-22 RX ORDER — PANTOPRAZOLE SODIUM 40 MG/1
40 TABLET, DELAYED RELEASE ORAL
Qty: 90 TABLET | Refills: 3 | Status: SHIPPED | OUTPATIENT
Start: 2024-04-22

## 2024-04-22 NOTE — TELEPHONE ENCOUNTER
Provider Staff:  Action required for this patient    Requires labs      Please see care gap opportunities below in Care Due Message.    Thanks!  [facility]     Appointments      Date Provider   Last Visit   4/15/2024 Suze Chi MD   Next Visit   Visit date not found Suze Chi MD     Refill Decision Note   Fadumo Obey  is requesting a refill authorization.  Brief Assessment and Rationale for Refill:  Approve     Medication Therapy Plan:         Comments:     Note composed:11:00 AM 04/22/2024

## 2024-04-22 NOTE — TELEPHONE ENCOUNTER
Care Due:                  Date            Visit Type   Department     Provider  --------------------------------------------------------------------------------                                EP -         [facility] FAMILY                              PRIMARY      MED/ INTERNAL  Last Visit: 04-      CARE ([facility])   MED/ PEDS      Suze Chi  Next Visit: None Scheduled  None         None Found                                                            Last  Test          Frequency    Reason                     Performed    Due Date  --------------------------------------------------------------------------------    CBC.........  12 months..  ibuprofen................  12- 12-    Cr..........  12 months..  ibuprofen................  01- 01-    [employer] Embedded Care Due Messages. Reference number: [id number].   4/22/2024 12:22:37 AM CDT

## 2024-04-30 ENCOUNTER — LAB VISIT (OUTPATIENT)
Dept: LAB | Facility: HOSPITAL | Age: 49
End: 2024-04-30
Attending: FAMILY MEDICINE
Payer: COMMERCIAL

## 2024-04-30 DIAGNOSIS — Z00.00 ROUTINE HEALTH MAINTENANCE: ICD-10-CM

## 2024-04-30 DIAGNOSIS — Z02.0 SCHOOL PHYSICAL EXAM: ICD-10-CM

## 2024-04-30 LAB
ALBUMIN SERPL BCP-MCNC: 4.1 G/DL (ref 3.5–5.2)
ALP SERPL-CCNC: 78 U/L (ref 55–135)
ALT SERPL W/O P-5'-P-CCNC: 14 U/L (ref 10–44)
ANION GAP SERPL CALC-SCNC: 9 MMOL/L (ref 8–16)
AST SERPL-CCNC: 17 U/L (ref 10–40)
BASOPHILS # BLD AUTO: 0.01 K/UL (ref 0–0.2)
BASOPHILS NFR BLD: 0.3 % (ref 0–1.9)
BILIRUB SERPL-MCNC: 0.7 MG/DL (ref 0.1–1)
BUN SERPL-MCNC: 9 MG/DL (ref 6–20)
CALCIUM SERPL-MCNC: 9.3 MG/DL (ref 8.7–10.5)
CHLORIDE SERPL-SCNC: 105 MMOL/L (ref 95–110)
CHOLEST SERPL-MCNC: 159 MG/DL (ref 120–199)
CHOLEST/HDLC SERPL: 1.9 {RATIO} (ref 2–5)
CO2 SERPL-SCNC: 25 MMOL/L (ref 23–29)
CREAT SERPL-MCNC: 0.8 MG/DL (ref 0.5–1.4)
DIFFERENTIAL METHOD BLD: ABNORMAL
EOSINOPHIL # BLD AUTO: 0 K/UL (ref 0–0.5)
EOSINOPHIL NFR BLD: 1.1 % (ref 0–8)
ERYTHROCYTE [DISTWIDTH] IN BLOOD BY AUTOMATED COUNT: 14 % (ref 11.5–14.5)
EST. GFR  (NO RACE VARIABLE): >60 ML/MIN/1.73 M^2
ESTIMATED AVG GLUCOSE: 103 MG/DL (ref 68–131)
GLUCOSE SERPL-MCNC: 92 MG/DL (ref 70–110)
HBA1C MFR BLD: 5.2 % (ref 4–5.6)
HCT VFR BLD AUTO: 40.9 % (ref 37–48.5)
HDLC SERPL-MCNC: 85 MG/DL (ref 40–75)
HDLC SERPL: 53.5 % (ref 20–50)
HGB BLD-MCNC: 13.3 G/DL (ref 12–16)
IMM GRANULOCYTES # BLD AUTO: 0.01 K/UL (ref 0–0.04)
IMM GRANULOCYTES NFR BLD AUTO: 0.3 % (ref 0–0.5)
LDLC SERPL CALC-MCNC: 65.4 MG/DL (ref 63–159)
LYMPHOCYTES # BLD AUTO: 1.8 K/UL (ref 1–4.8)
LYMPHOCYTES NFR BLD: 52.1 % (ref 18–48)
MCH RBC QN AUTO: 30.1 PG (ref 27–31)
MCHC RBC AUTO-ENTMCNC: 32.5 G/DL (ref 32–36)
MCV RBC AUTO: 93 FL (ref 82–98)
MONOCYTES # BLD AUTO: 0.4 K/UL (ref 0.3–1)
MONOCYTES NFR BLD: 10.2 % (ref 4–15)
NEUTROPHILS # BLD AUTO: 1.3 K/UL (ref 1.8–7.7)
NEUTROPHILS NFR BLD: 36 % (ref 38–73)
NONHDLC SERPL-MCNC: 74 MG/DL
NRBC BLD-RTO: 0 /100 WBC
PLATELET # BLD AUTO: 285 K/UL (ref 150–450)
PMV BLD AUTO: 10.6 FL (ref 9.2–12.9)
POTASSIUM SERPL-SCNC: 3.7 MMOL/L (ref 3.5–5.1)
PROT SERPL-MCNC: 7.8 G/DL (ref 6–8.4)
RBC # BLD AUTO: 4.42 M/UL (ref 4–5.4)
SODIUM SERPL-SCNC: 139 MMOL/L (ref 136–145)
TRIGL SERPL-MCNC: 43 MG/DL (ref 30–150)
TSH SERPL DL<=0.005 MIU/L-ACNC: 1.94 UIU/ML (ref 0.4–4)
WBC # BLD AUTO: 3.53 K/UL (ref 3.9–12.7)

## 2024-04-30 PROCEDURE — 86787 VARICELLA-ZOSTER ANTIBODY: CPT | Performed by: FAMILY MEDICINE

## 2024-04-30 PROCEDURE — 86706 HEP B SURFACE ANTIBODY: CPT | Performed by: FAMILY MEDICINE

## 2024-04-30 PROCEDURE — 85025 COMPLETE CBC W/AUTO DIFF WBC: CPT | Performed by: FAMILY MEDICINE

## 2024-04-30 PROCEDURE — 84443 ASSAY THYROID STIM HORMONE: CPT | Performed by: FAMILY MEDICINE

## 2024-04-30 PROCEDURE — 86735 MUMPS ANTIBODY: CPT | Performed by: FAMILY MEDICINE

## 2024-04-30 PROCEDURE — 86480 TB TEST CELL IMMUN MEASURE: CPT | Performed by: FAMILY MEDICINE

## 2024-04-30 PROCEDURE — 36415 COLL VENOUS BLD VENIPUNCTURE: CPT | Mod: PO | Performed by: FAMILY MEDICINE

## 2024-04-30 PROCEDURE — 80053 COMPREHEN METABOLIC PANEL: CPT | Performed by: FAMILY MEDICINE

## 2024-04-30 PROCEDURE — 80061 LIPID PANEL: CPT | Performed by: FAMILY MEDICINE

## 2024-04-30 PROCEDURE — 86765 RUBEOLA ANTIBODY: CPT | Performed by: FAMILY MEDICINE

## 2024-04-30 PROCEDURE — 86762 RUBELLA ANTIBODY: CPT | Performed by: FAMILY MEDICINE

## 2024-04-30 PROCEDURE — 83036 HEMOGLOBIN GLYCOSYLATED A1C: CPT | Performed by: FAMILY MEDICINE

## 2024-05-01 LAB
GAMMA INTERFERON BACKGROUND BLD IA-ACNC: 0.02 IU/ML
M TB IFN-G CD4+ BCKGRND COR BLD-ACNC: 0.01 IU/ML
M TB IFN-G CD4+ BCKGRND COR BLD-ACNC: 0.02 IU/ML
MITOGEN IGNF BCKGRD COR BLD-ACNC: 9.98 IU/ML
MUMPS IGG INTERPRETATION: POSITIVE
MUMPS IGG SCREEN: 203 AU/ML
RUBEOLA IGG ANTIBODY: 56.5 AU/ML
RUBEOLA INTERPRETATION: POSITIVE
RUBV IGG SER-ACNC: 38.3 IU/ML
RUBV IGG SER-IMP: REACTIVE
TB GOLD PLUS: NEGATIVE
VARICELLA INTERPRETATION: POSITIVE
VARICELLA ZOSTER IGG: 1590

## 2024-05-03 LAB
HBV SURFACE AB SER QL IA: POSITIVE
HBV SURFACE AB SERPL IA-ACNC: 171 MIU/ML

## 2024-10-23 ENCOUNTER — TELEPHONE (OUTPATIENT)
Dept: FAMILY MEDICINE | Facility: CLINIC | Age: 49
End: 2024-10-23
Payer: COMMERCIAL

## 2024-10-23 NOTE — TELEPHONE ENCOUNTER
Called patient to schedule her with her PCP for her RX refill and patient was fine with the change. Appointment was schedule for 720 with PCP.

## 2024-10-24 ENCOUNTER — OFFICE VISIT (OUTPATIENT)
Dept: FAMILY MEDICINE | Facility: CLINIC | Age: 49
End: 2024-10-24
Payer: COMMERCIAL

## 2024-10-24 VITALS — DIASTOLIC BLOOD PRESSURE: 70 MMHG | SYSTOLIC BLOOD PRESSURE: 123 MMHG | HEART RATE: 69 BPM

## 2024-10-24 DIAGNOSIS — K21.9 GASTROESOPHAGEAL REFLUX DISEASE WITHOUT ESOPHAGITIS: ICD-10-CM

## 2024-10-24 DIAGNOSIS — M54.50 BILATERAL LOW BACK PAIN WITHOUT SCIATICA, UNSPECIFIED CHRONICITY: ICD-10-CM

## 2024-10-24 DIAGNOSIS — F90.9 ATTENTION DEFICIT HYPERACTIVITY DISORDER (ADHD), UNSPECIFIED ADHD TYPE: ICD-10-CM

## 2024-10-24 DIAGNOSIS — Z00.00 GENERAL MEDICAL EXAM: Primary | ICD-10-CM

## 2024-10-24 RX ORDER — IBUPROFEN 800 MG/1
800 TABLET ORAL EVERY 6 HOURS PRN
Qty: 90 TABLET | Refills: 1 | Status: SHIPPED | OUTPATIENT
Start: 2024-10-24

## 2024-10-24 RX ORDER — DEXTROAMPHETAMINE SACCHARATE, AMPHETAMINE ASPARTATE MONOHYDRATE, DEXTROAMPHETAMINE SULFATE AND AMPHETAMINE SULFATE 7.5; 7.5; 7.5; 7.5 MG/1; MG/1; MG/1; MG/1
30 CAPSULE, EXTENDED RELEASE ORAL EVERY MORNING
Qty: 90 CAPSULE | Refills: 0 | Status: SHIPPED | OUTPATIENT
Start: 2024-10-24

## 2024-10-24 NOTE — PROGRESS NOTES
Routine Office Visit     Patient Name: Fadumo Barnhart    : 1975  MRN: [id number]    Subjective     History of Present Illness    CHIEF COMPLAINT:  Patient presents today for follow-up and medication refill.    MEDICATIONS:  She takes Adderall Monday through Friday, and occasionally on  for social functions. She denies regular weekend use unless participating in specific events. She also uses ibuprofen as needed.    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 She expresses interest in potentially returning to nursing school in the future if resources allow.          Answers submitted by the patient for this visit:  Review of Systems Questionnaire (Submitted on 10/23/2024)  activity change: No  unexpected weight change: No  neck pain: No  hearing loss: No  rhinorrhea: No  trouble swallowing: No  eye discharge: No  visual disturbance: No  chest tightness: No  wheezing: No  chest pain: No  palpitations: No  blood in stool: No  constipation: No  vomiting: No  diarrhea: No  polydipsia: No  polyuria: No  difficulty urinating: No  hematuria: No  menstrual problem: No  dysuria: No  joint swelling: No  arthralgias: No  headaches: No  weakness: No  confusion: No  dysphoric mood: No    Objective       /70   Pulse 69   Physical Exam  Constitutional:       Appearance: She is well-developed.   HENT:      Head: Normocephalic and atraumatic.      Right Ear: External ear normal.      Left Ear: External ear normal.   Eyes:      Conjunctiva/sclera: Conjunctivae normal.   Pulmonary:      Effort: Pulmonary effort is normal.   Musculoskeletal:      Cervical back: Normal range of motion.   Skin:     Coloration: Skin is not pale.   Neurological:      Mental Status:  She is alert and oriented to person, place, and time.   Psychiatric:         Behavior: Behavior normal.         Thought Content: Thought content normal.         Judgment: Judgment normal.           Assessment     Assessment & Plan     Reviewed patient's blood pressure, noting recent reading of 123/70 as acceptable   Assessed continued need for ADHD medication in context of patient's current work schedule and activities   Determined annual in-person visit and lab work are due    LABS:   Blood work ordered.   Schedule labs for a Saturday at the lab.    FOLLOW UP:   Follow up in 6 months for in-person visit.         Problem List Items Addressed This Visit          Psychiatric    ADHD (attention deficit hyperactivity disorder)    Relevant Medications    dextroamphetamine-amphetamine (ADDERALL XR) 30 MG 24 hr capsule  Reviewed [place]   The current medical regimen is effective;  continue present plan and medications.   BP wnl          GI    GERD (gastroesophageal reflux disease)  The current medical regimen is effective;  continue present plan and medications.        Other Visit Diagnoses       General medical exam    -  Primary    Relevant Orders    CBC Auto Differential    Comprehensive Metabolic Panel    Lipid Panel    Hemoglobin A1C    TSH    Bilateral low back pain without sciatica, unspecified chronicity        Relevant Medications    ibuprofen (ADVIL,MOTRIN) 800 MG tablet              This note was generated with the assistance of ambient listening technology. Verbal consent was obtained by the patient and accompanying visitor(s) for the recording of patient appointment to facilitate this note. I attest to having reviewed and edited the generated note for accuracy, though some syntax or spelling errors may persist. Please contact the author of this note for any clarification.

## 2024-11-11 NOTE — LETTER
November 14, 2018      [facility]  [address] [facility]  [place] [address]  Phone: [phone]  Fax: [phone]       Patient: Fadumo Barnhart   YOB: 1975  Date of Visit: 11/14/2018    To Whom It May Concern:    Nate Barnhart  was at [facility] on 11/14/2018. She may return to work/school on 11/15/2018 with no restrictions. If you have any questions or concerns, or if I can be of further assistance, please do not hesitate to contact me.    Sincerely,      Soha Ybarra, NP      EMERGENCY DEPARTMENT ENCOUNTER      NAME: Tonia Smith  AGE: 42 year old female  YOB: 1982  MRN: [id number]  EVALUATION DATE & TIME: 11/11/2024  2:50 PM    PCP: Moira Jimenez    ED PROVIDER: Antonio Fontenot PA-C      Chief Complaint   Patient presents with    Headache         FINAL IMPRESSION:  1. Tension headache    2. Elevated blood pressure reading without diagnosis of hypertension          ED COURSE & MEDICAL DECISION MAKING:    Pertinent Labs & Imaging studies reviewed. (See chart for details)  2:54 PM I met the patient and performed my initial interview and exam. Discussed plan for discharge.     42 year old female presents to the Emergency Department for evaluation of tension headache.     ED Course as of 11/11/24 1524   Mon Nov 11, 2024   1508 Patient is a 42-year-old female, no significant past medical history, presents emergency department for evaluation of headache.  Patient reports headache since 11/7.  She took 1 dose of ibuprofen on Saturday, did not help with her headache, and she has not taken any doses.  Denies any fevers.  Denies any vomiting.  No changes in vision.  Neurologically intact here in the emergency department.  She does have some elevated blood pressures, however she has previously had this in the past.  I doubt this is contributing to her symptoms, and I do not think there is an indication for additional labs here.  She has no numbness or tingling, good strength and sensation upper and lower extremities bilaterally.  Given that she has not had any medications, will give a dose of Toradol, Reglan, and Benadryl here.  Review of her chart shows that she has had issues with headaches in the past, and I suspect this is likely tension etiology.  I do not think there is any additional imaging that is required here given the ongoing symptoms, low suspicion for new abnormality, bleed, or hemorrhage.  Otherwise well-appearing here.  Plan for medications, and discharge.   She is agreeable with this plan.       Medical Decision Making  Obtained supplemental history:Supplemental history obtained?: No  Reviewed external records: External records reviewed?: Outpatient Record: Patient office visit on 07/15/2024, office visit on 02/6/2024  Care impacted by chronic illness:Hypertension  Did you consider but not order tests?: Work up considered but not performed and documented in chart, if applicable  Did you interpret images independently?: Independent interpretation of ECG and images noted in documentation, when applicable.  Consultation discussion with other provider:Did you involve another provider (consultant, , pharmacy, etc.)?: No  Discharge. I prescribed additional prescription strength medication(s) as charted. N/A.    MIPS: Not Applicable    At the conclusion of the encounter I discussed the results of all of the tests and the disposition. The questions were answered. The patient or family acknowledged understanding and was agreeable with the care plan.       0 minutes of critical care time     MEDICATIONS GIVEN IN THE EMERGENCY:  Medications   ketorolac (TORADOL) injection 15 mg (15 mg Intramuscular $Given 11/11/24 1514)   diphenhydrAMINE (BENADRYL) capsule 25 mg (25 mg Oral $Given 11/11/24 1514)   metoclopramide (REGLAN) tablet 10 mg (10 mg Oral $Given 11/11/24 1514)       NEW PRESCRIPTIONS STARTED AT TODAY'S ER VISIT  New Prescriptions    METOCLOPRAMIDE (REGLAN) 10 MG TABLET    Take 1 tablet (10 mg) by mouth 4 times daily (before meals and nightly).          =================================================================    HPI    Patient information was obtained from: Patient     Use of : N/A      Tonia Smith is a 42 year old female with a pertinent history of lumbar pain, elevated blood pressure readings, headache who presents to this ED for evaluation of ongoing headache.  Patient reports symptoms since 11/7/2024, complaining of some dizziness with this.   Last had ibuprofen on Saturday.  Denies any nausea or vomiting.  Patient denies any other head injury, fevers, vomiting, chest pain, shortness of breath, numbness or tingling.    PAST MEDICAL HISTORY:  Past Medical History:   Diagnosis Date    Hypertension     Preeclampsia        PAST SURGICAL HISTORY:  Past Surgical History:   Procedure Laterality Date     SECTION  10/5/2013    Procedure:  SECTION;;  Surgeon: Noemy Fowler MD;  Location: [facility]     SECTION N/A 2016    Procedure:  SECTION;  Surgeon: Leah Patiño MD;  Location: [facility]    GYN SURGERY             CURRENT MEDICATIONS:    metoclopramide (REGLAN) 10 MG tablet  acetaminophen (TYLENOL) 500 MG tablet  acetaminophen (TYLENOL) 500 MG tablet  acetaminophen (TYLENOL) 500 MG tablet  benzonatate (TESSALON) 100 MG capsule  diclofenac (VOLTAREN) 1 % topical gel  diclofenac (VOLTAREN) 1 % topical gel  fluticasone (FLONASE) 50 MCG/ACT nasal spray  Prenatal Vit-Fe Fumarate-FA (PRENATAL MULTIVITAMIN W/IRON) 27-0.8 MG tablet  Sodium Chloride-Sodium Bicarb (AYR SALINE NASAL NETI RINSE) 1.57 g PACK         ALLERGIES:  No Known Allergies    FAMILY HISTORY:  Family History   Problem Relation Age of Onset    Family History Negative Other        SOCIAL HISTORY:   Social History     Socioeconomic History    Marital status:     Number of children: 0    Years of education: 9th grade   Occupational History    Occupation: labor     Employer: [employer]   Tobacco Use    Smoking status: Never     Passive exposure: Never    Smokeless tobacco: Never   Substance and Sexual Activity    Alcohol use: No    Drug use: No    Sexual activity: Yes     Partners: Male     Birth control/protection: None   Other Topics Concern     Service No    Blood Transfusions No    Caffeine Concern No    Occupational Exposure No    Hobby Hazards No    Sleep Concern No    Stress Concern No    Weight Concern No    Special  Diet No    Back Care No    Exercise No    Bike Helmet No    Seat Belt No    Self-Exams No     Social Drivers of Health     Financial Resource Strain: Not on File (2022)    Received from [facility]    Financial Resource Strain     Financial Resource Strain: 0   Food Insecurity: Not on File (2024)    Received from [facility]    Food Insecurity     Food: 0   Transportation Needs: Not on File (2022)    Received from [facility]    Transportation Needs     Transportation: 0   Physical Activity: Not on File (2022)    [facility] from [facility]    Physical Activity     Physical Activity: 0   Stress: Not on File (2022)    Received from [facility]    Stress     Stress: 0   Social Connections: Not on File (2024)    Received from [facility]    Social Connections     Connectedness: 0   Interpersonal Safety: Low Risk  (7/15/2024)    Interpersonal Safety     Do you feel physically and emotionally safe where you currently live?: Yes     Within the past 12 months, have you been hit, slapped, kicked or otherwise physically hurt by someone?: No     Within the past 12 months, have you been humiliated or emotionally abused in other ways by your partner or ex-partner?: No   Housing Stability: Not on File (2022)    Received from [facility]    Housing Stability     Housin       VITALS:  BP (!) 161/92   Pulse 77   Temp 98.2  F (36.8  C)   Resp 18   Wt 76.2 kg (168 lb)   LMP 10/29/2024   SpO2 100%   BMI 30.73 kg/m      PHYSICAL EXAM    Physical Exam  Vitals and nursing note reviewed.   Constitutional:       General: She is not in acute distress.     Appearance: Normal appearance. She is normal weight. She is not toxic-appearing or diaphoretic.   HENT:      Head: Normocephalic.      Right Ear: External ear normal.      Left Ear: External ear normal.      Mouth/Throat:      Mouth: Mucous membranes are moist.      Pharynx: No oropharyngeal exudate or posterior oropharyngeal erythema.   Eyes:       Extraocular Movements: Extraocular movements intact.      Pupils: Pupils are equal, round, and reactive to light.   Cardiovascular:      Rate and Rhythm: Normal rate and regular rhythm.      Heart sounds: Normal heart sounds. No murmur heard.     No friction rub. No gallop.   Pulmonary:      Effort: Pulmonary effort is normal. No respiratory distress.      Breath sounds: No wheezing.   Abdominal:      General: Abdomen is flat. Bowel sounds are normal. There is no distension.      Palpations: Abdomen is soft.      Tenderness: There is no abdominal tenderness. There is no right CVA tenderness, left CVA tenderness, guarding or rebound.   Skin:     General: Skin is warm.      Findings: No erythema or rash.   Neurological:      Mental Status: She is alert. Mental status is at baseline.      Sensory: No sensory deficit.      Motor: No weakness.           LAB:  All pertinent labs reviewed and interpreted.  Labs Ordered and Resulted from Time of ED Arrival to Time of ED Departure - No data to display    RADIOLOGY:  Reviewed all pertinent imaging. Please see official radiology report.  No orders to display       PROCEDURES:   None.     Antonio Fontenot PA-C  Emergency Medicine  [facility] [place] [place] EMERGENCY DEPARTMENT  [address] [place] [id number]  [phone]  Dept: [phone]       Antonio Fontenot PA-C  11/11/24 1095

## 2025-04-17 DIAGNOSIS — K21.9 GASTROESOPHAGEAL REFLUX DISEASE WITHOUT ESOPHAGITIS: ICD-10-CM

## 2025-04-17 RX ORDER — PANTOPRAZOLE SODIUM 40 MG/1
40 TABLET, DELAYED RELEASE ORAL
Qty: 90 TABLET | Refills: 1 | Status: SHIPPED | OUTPATIENT
Start: 2025-04-17

## 2025-04-17 NOTE — TELEPHONE ENCOUNTER
Care Due:                  Date            Visit Type   Department     Provider  --------------------------------------------------------------------------------                                ESTABLISHED   [facility]                              PATIENT -    / INTERNAL MED  Last Visit: 10-      VIRTUAL      / PEDS         Suze Chi                              EP -         [facility]                              PRIMARY      / INTERNAL MED  Next Visit: 04-      CARE ([facility])   / DEDRICK Chi                                                            Last  Test          Frequency    Reason                     Performed    Due Date  --------------------------------------------------------------------------------    CBC.........  12 months..  ibuprofen................  04- 04-    Cr..........  12 months..  ibuprofen................  04- 04-    [employer] Embedded Care Due Messages. Reference number: [id number].   4/17/2025 12:19:35 AM CDT

## 2025-04-17 NOTE — TELEPHONE ENCOUNTER
Provider Staff:  Action required for this patient    Requires labs      Please see care gap opportunities below in Care Due Message.    Thanks!  [facility]     Appointments      Date Provider   Last Visit   10/24/2024 Suze Chi MD   Next Visit   4/28/2025 Suze Chi MD     Refill Decision Note   Fadumo EdwardsDorothy  is requesting a refill authorization.  Brief Assessment and Rationale for Refill:  Approve     Medication Therapy Plan:         Comments:     Note composed:6:46 AM 04/17/2025

## 2025-04-23 ENCOUNTER — LAB VISIT (OUTPATIENT)
Dept: LAB | Facility: HOSPITAL | Age: 50
End: 2025-04-23
Attending: FAMILY MEDICINE
Payer: COMMERCIAL

## 2025-04-23 DIAGNOSIS — Z00.00 GENERAL MEDICAL EXAM: ICD-10-CM

## 2025-04-23 LAB
ABSOLUTE EOSINOPHIL (OHS): 0.05 K/UL
ABSOLUTE MONOCYTE (OHS): 0.35 K/UL (ref 0.3–1)
ABSOLUTE NEUTROPHIL COUNT (OHS): 0.76 K/UL (ref 1.8–7.7)
ALBUMIN SERPL BCP-MCNC: 3.4 G/DL (ref 3.5–5.2)
ALP SERPL-CCNC: 78 UNIT/L (ref 40–150)
ALT SERPL W/O P-5'-P-CCNC: 14 UNIT/L (ref 10–44)
ANION GAP (OHS): 8 MMOL/L (ref 8–16)
AST SERPL-CCNC: 15 UNIT/L (ref 11–45)
BASOPHILS # BLD AUTO: 0.02 K/UL
BASOPHILS NFR BLD AUTO: 0.6 %
BILIRUB SERPL-MCNC: 0.7 MG/DL (ref 0.1–1)
BUN SERPL-MCNC: 11 MG/DL (ref 6–20)
CALCIUM SERPL-MCNC: 8.7 MG/DL (ref 8.7–10.5)
CHLORIDE SERPL-SCNC: 107 MMOL/L (ref 95–110)
CHOLEST SERPL-MCNC: 148 MG/DL (ref 120–199)
CHOLEST/HDLC SERPL: 2.1 {RATIO} (ref 2–5)
CO2 SERPL-SCNC: 26 MMOL/L (ref 23–29)
CREAT SERPL-MCNC: 0.8 MG/DL (ref 0.5–1.4)
EAG (OHS): 97 MG/DL (ref 68–131)
ERYTHROCYTE [DISTWIDTH] IN BLOOD BY AUTOMATED COUNT: 13.3 % (ref 11.5–14.5)
GFR SERPLBLD CREATININE-BSD FMLA CKD-EPI: >60 ML/MIN/1.73/M2
GLUCOSE SERPL-MCNC: 78 MG/DL (ref 70–110)
HBA1C MFR BLD: 5 % (ref 4–5.6)
HCT VFR BLD AUTO: 38.8 % (ref 37–48.5)
HDLC SERPL-MCNC: 69 MG/DL (ref 40–75)
HDLC SERPL: 46.6 % (ref 20–50)
HGB BLD-MCNC: 12 GM/DL (ref 12–16)
IMM GRANULOCYTES # BLD AUTO: 0 K/UL (ref 0–0.04)
IMM GRANULOCYTES NFR BLD AUTO: 0 % (ref 0–0.5)
LDLC SERPL CALC-MCNC: 66.4 MG/DL (ref 63–159)
LYMPHOCYTES # BLD AUTO: 2.29 K/UL (ref 1–4.8)
MCH RBC QN AUTO: 29.3 PG (ref 27–31)
MCHC RBC AUTO-ENTMCNC: 30.9 G/DL (ref 32–36)
MCV RBC AUTO: 95 FL (ref 82–98)
NONHDLC SERPL-MCNC: 79 MG/DL
NUCLEATED RBC (/100WBC) (OHS): 0 /100 WBC
PLATELET # BLD AUTO: 217 K/UL (ref 150–450)
PMV BLD AUTO: 11 FL (ref 9.2–12.9)
POTASSIUM SERPL-SCNC: 4 MMOL/L (ref 3.5–5.1)
PROT SERPL-MCNC: 6.8 GM/DL (ref 6–8.4)
RBC # BLD AUTO: 4.09 M/UL (ref 4–5.4)
RELATIVE EOSINOPHIL (OHS): 1.4 %
RELATIVE LYMPHOCYTE (OHS): 66 % (ref 18–48)
RELATIVE MONOCYTE (OHS): 10.1 % (ref 4–15)
RELATIVE NEUTROPHIL (OHS): 21.9 % (ref 38–73)
SODIUM SERPL-SCNC: 141 MMOL/L (ref 136–145)
TRIGL SERPL-MCNC: 63 MG/DL (ref 30–150)
TSH SERPL-ACNC: 1.67 UIU/ML (ref 0.4–4)
WBC # BLD AUTO: 3.47 K/UL (ref 3.9–12.7)

## 2025-04-23 PROCEDURE — 85025 COMPLETE CBC W/AUTO DIFF WBC: CPT

## 2025-04-23 PROCEDURE — 83036 HEMOGLOBIN GLYCOSYLATED A1C: CPT

## 2025-04-23 PROCEDURE — 36415 COLL VENOUS BLD VENIPUNCTURE: CPT | Mod: PO

## 2025-04-23 PROCEDURE — 80061 LIPID PANEL: CPT

## 2025-04-23 PROCEDURE — 82374 ASSAY BLOOD CARBON DIOXIDE: CPT

## 2025-04-23 PROCEDURE — 84443 ASSAY THYROID STIM HORMONE: CPT

## 2025-04-24 ENCOUNTER — PATIENT OUTREACH (OUTPATIENT)
Dept: ADMINISTRATIVE | Facility: HOSPITAL | Age: 50
End: 2025-04-24
Payer: COMMERCIAL

## 2025-04-28 ENCOUNTER — RESULTS FOLLOW-UP (OUTPATIENT)
Dept: WOUND CARE | Facility: HOSPITAL | Age: 50
End: 2025-04-28